# Patient Record
Sex: FEMALE | Race: WHITE | Employment: OTHER | URBAN - METROPOLITAN AREA
[De-identification: names, ages, dates, MRNs, and addresses within clinical notes are randomized per-mention and may not be internally consistent; named-entity substitution may affect disease eponyms.]

---

## 2017-04-03 RX ORDER — METOPROLOL TARTRATE 25 MG/1
12.5 TABLET, FILM COATED ORAL 2 TIMES DAILY
Qty: 90 TAB | Refills: 2 | Status: SHIPPED | OUTPATIENT
Start: 2017-04-03 | End: 2017-10-05 | Stop reason: SDUPTHER

## 2017-05-03 ENCOUNTER — OFFICE VISIT (OUTPATIENT)
Dept: FAMILY MEDICINE CLINIC | Age: 62
End: 2017-05-03

## 2017-05-03 VITALS
BODY MASS INDEX: 20.46 KG/M2 | WEIGHT: 122.8 LBS | SYSTOLIC BLOOD PRESSURE: 118 MMHG | DIASTOLIC BLOOD PRESSURE: 84 MMHG | OXYGEN SATURATION: 98 % | TEMPERATURE: 98.5 F | HEART RATE: 78 BPM | RESPIRATION RATE: 16 BRPM | HEIGHT: 65 IN

## 2017-05-03 DIAGNOSIS — Z13.1 SCREENING FOR DIABETES MELLITUS: ICD-10-CM

## 2017-05-03 DIAGNOSIS — Z00.00 ROUTINE HEALTH MAINTENANCE: ICD-10-CM

## 2017-05-03 DIAGNOSIS — Z13.29 SCREENING FOR THYROID DISORDER: ICD-10-CM

## 2017-05-03 DIAGNOSIS — M85.80 OSTEOPENIA, UNSPECIFIED LOCATION: Chronic | ICD-10-CM

## 2017-05-03 DIAGNOSIS — I25.119 CORONARY ARTERY DISEASE INVOLVING NATIVE CORONARY ARTERY OF NATIVE HEART WITH ANGINA PECTORIS (HCC): Chronic | ICD-10-CM

## 2017-05-03 DIAGNOSIS — J01.01 RECURRENT MAXILLARY SINUSITIS: Primary | ICD-10-CM

## 2017-05-03 RX ORDER — AZITHROMYCIN 250 MG/1
TABLET, FILM COATED ORAL
Qty: 6 TAB | Refills: 0 | Status: SHIPPED | OUTPATIENT
Start: 2017-05-03 | End: 2017-05-08

## 2017-05-03 NOTE — PROGRESS NOTES
Laquita Taylor is a 57 y.o. female here for cold symptoms      1. Have you been to the ER, urgent care clinic or hospitalized since your last visit? NO.     2. Have you seen or consulted any other health care providers outside of the [address] [place] [address] since your last visit (Include any pap smears or colon screening)? NO      Do you have an Advanced Directive? NO    Would you like information on Advanced Directives?  NO

## 2017-05-03 NOTE — PROGRESS NOTES
HISTORY OF PRESENT ILLNESS  De Chiquita is a 57 y.o. female. Cold Symptoms   The history is provided by the patient and medical records. This is a recurrent problem. Episode onset: 1 week ago. Associated symptoms include chills and headaches. Pertinent negatives include no chest pain, no shortness of breath and no wheezing. Patient Active Problem List   Diagnosis Code    Colon polyps K63.5    Glaucoma H40.9    Osteopenia M85.80    Routine health maintenance Z00.00    Cyst of joint of hand M25.849    Recurrent sinusitis J32.9    Postmenopausal state Z78.0    Atypical chest pain R07.89    Coronary artery disease involving native coronary artery of native heart with angina pectoris (HCC) I25.119       Current Outpatient Prescriptions:     metoprolol tartrate (LOPRESSOR) 25 mg tablet, Take 0.5 Tabs by mouth two (2) times a day., Disp: 90 Tab, Rfl: 2    multivitamin with iron (DAILY MULTI-VITAMINS/IRON) tablet, Take 1 Tab by mouth daily. , Disp: , Rfl:     calcium-cholecalciferol, d3, (CALCIUM 600 + D) 600-125 mg-unit Tab, Take  by mouth., Disp: , Rfl:       Review of Systems   Constitutional: Positive for chills and fever (99.8). HENT: Positive for congestion. Facial pain and pressure, purulent nasal discharge   Respiratory: Positive for cough (with sinus drainage). Negative for shortness of breath and wheezing. Cardiovascular: Negative for chest pain and palpitations. Neurological: Positive for headaches. Endo/Heme/Allergies: Positive for environmental allergies. Visit Vitals    /84 (BP 1 Location: Left arm, BP Patient Position: Sitting)    Pulse 78    Temp 98.5 °F (36.9 °C) (Oral)    Resp 16    Ht 5' 4.5\" (1.638 m)    Wt 122 lb 12.8 oz (55.7 kg)    SpO2 98%    BMI 20.75 kg/m2     Physical Exam   Constitutional: She is oriented to person, place, and time. She appears well-developed and well-nourished. HENT:   Head: Normocephalic.    Right Ear: Tympanic membrane and ear canal normal.   Left Ear: Tympanic membrane and ear canal normal.   Nose: Right sinus exhibits maxillary sinus tenderness. Right sinus exhibits no frontal sinus tenderness. Left sinus exhibits maxillary sinus tenderness. Left sinus exhibits no frontal sinus tenderness. Mouth/Throat: Oropharynx is clear and moist.   Eyes: Conjunctivae and EOM are normal.   Neck: Neck supple. Cardiovascular: Normal rate, regular rhythm and normal heart sounds. Pulmonary/Chest: Effort normal and breath sounds normal.   Lymphadenopathy:     She has no cervical adenopathy. Neurological: She is alert and oriented to person, place, and time. Skin: Skin is warm and dry. Psychiatric: She has a normal mood and affect. Her behavior is normal.   Nursing note and vitals reviewed. ASSESSMENT and PLAN    ICD-10-CM ICD-9-CM    1. Recurrent maxillary sinusitis J01.01 461.0 azithromycin (ZITHROMAX) 250 mg tablet   Complete prescribed course of antibiotics. Follow up for new symptoms, worsening symptoms or failure to improve.

## 2017-05-03 NOTE — PATIENT INSTRUCTIONS
Complete prescribed course of antibiotics. Follow up for new symptoms, worsening symptoms or failure to improve. Sinusitis: Care Instructions  Your Care Instructions    Sinusitis is an infection of the lining of the sinus cavities in your head. Sinusitis often follows a cold. It causes pain and pressure in your head and face. In most cases, sinusitis gets better on its own in 1 to 2 weeks. But some mild symptoms may last for several weeks. Sometimes antibiotics are needed. Follow-up care is a key part of your treatment and safety. Be sure to make and go to all appointments, and call your doctor if you are having problems. It's also a good idea to know your test results and keep a list of the medicines you take. How can you care for yourself at home? · Take an over-the-counter pain medicine, such as acetaminophen (Tylenol), ibuprofen (Advil, Motrin), or naproxen (Aleve). Read and follow all instructions on the label. · If the doctor prescribed antibiotics, take them as directed. Do not stop taking them just because you feel better. You need to take the full course of antibiotics. · Be careful when taking over-the-counter cold or flu medicines and Tylenol at the same time. Many of these medicines have acetaminophen, which is Tylenol. Read the labels to make sure that you are not taking more than the recommended dose. Too much acetaminophen (Tylenol) can be harmful. · Breathe warm, moist air from a steamy shower, a hot bath, or a sink filled with hot water. Avoid cold, dry air. Using a humidifier in your home may help. Follow the directions for cleaning the machine. · Use saline (saltwater) nasal washes to help keep your nasal passages open and wash out mucus and bacteria. You can buy saline nose drops at a grocery store or drugstore. Or you can make your own at home by adding 1 teaspoon of salt and 1 teaspoon of baking soda to 2 cups of distilled water.  If you make your own, fill a bulb syringe with the solution, insert the tip into your nostril, and squeeze gently. Danita Redhead your nose. · Put a hot, wet towel or a warm gel pack on your face 3 or 4 times a day for 5 to 10 minutes each time. · Try a decongestant nasal spray like oxymetazoline (Afrin). Do not use it for more than 3 days in a row. Using it for more than 3 days can make your congestion worse. When should you call for help? Call your doctor now or seek immediate medical care if:  · You have new or worse swelling or redness in your face or around your eyes. · You have a new or higher fever. Watch closely for changes in your health, and be sure to contact your doctor if:  · You have new or worse facial pain. · The mucus from your nose becomes thicker (like pus) or has new blood in it. · You are not getting better as expected. Where can you learn more? Go to http://sarah-yared.info/. Enter C982 in the search box to learn more about \"Sinusitis: Care Instructions. \"  Current as of: July 29, 2016  Content Version: 11.2  © 3617-0773 [employer]. Care instructions adapted under license by [employer] (which disclaims liability or warranty for this information). If you have questions about a medical condition or this instruction, always ask your healthcare professional. [address] any warranty or liability for your use of this information.

## 2017-05-03 NOTE — MR AVS SNAPSHOT
Visit Information Date & Time Provider Department Dept. Phone Encounter #  
 5/3/2017 10:45 AM Ronny Lurdes, 3 [facility] [phone] [id number] Your Appointments 9/5/2017 10:30 AM  
Follow Up with Saumil Lorna Sandifer, MD  
Cardio Specialist at [facility]) Appt Note: 5 m f/u after carotid duplex [address]  
  
   
 [address] Upcoming Health Maintenance Date Due DTaP/Tdap/Td series (1 - Tdap) 9/18/1975 ZOSTER VACCINE AGE 60> 9/18/2014 INFLUENZA AGE 9 TO ADULT 8/1/2017 BREAST CANCER SCRN MAMMOGRAM 11/14/2017 PAP AKA CERVICAL CYTOLOGY 11/10/2020 COLONOSCOPY 2/4/2023 Allergies as of 5/3/2017  Review Complete On: 5/3/2017 By: Ronny Chatman MD  
  
 Severity Noted Reaction Type Reactions Ciprofloxacin  04/02/2013    Diarrhea Doxycycline  04/02/2013    Swelling Prednisone  04/02/2013    Diarrhea Current Immunizations  Reviewed on 11/10/2015 Name Date Influenza Vaccine 10/15/2015 Not reviewed this visit You Were Diagnosed With   
  
 Codes Comments Recurrent maxillary sinusitis    -  Primary ICD-10-CM: J01.01 
ICD-9-CM: 461.0 Vitals BP Pulse Temp Resp Height(growth percentile) Weight(growth percentile) 118/84 (BP 1 Location: Left arm, BP Patient Position: Sitting) 78 98.5 °F (36.9 °C) (Oral) 16 5' 4.5\" (1.638 m) 122 lb 12.8 oz (55.7 kg) SpO2 BMI OB Status Smoking Status 98% 20.75 kg/m2 Postmenopausal Never Smoker BMI and BSA Data Body Mass Index Body Surface Area 20.75 kg/m 2 1.59 m 2 Preferred Pharmacy Pharmacy Name Phone [id number] [phone] [phone] Your Updated Medication List  
  
   
This list is accurate as of: 5/3/17 11:18 AM.  Always use your most recent med list.  
  
  
  
  
 azithromycin 250 mg tablet Commonly known as:  O[place]ata Catherine Take 2 tablets today, then take 1 tablet daily CALCIUM 600 + D 600-125 mg-unit Tab Generic drug:  calcium-cholecalciferol (d3) Take  by mouth. DAILY MULTI-VITAMINS/IRON tablet Generic drug:  multivitamin with iron Take 1 Tab by mouth daily. metoprolol tartrate 25 mg tablet Commonly known as:  LOPRESSOR Take 0.5 Tabs by mouth two (2) times a day. Prescriptions Sent to Pharmacy Refills  
 azithromycin (ZITHROMAX) 250 mg tablet 0 Sig: Take 2 tablets today, then take 1 tablet daily Class: Normal  
 Pharmacy: [id number] [phone] [facility] #: [phone] Patient Instructions Complete prescribed course of antibiotics. Follow up for new symptoms, worsening symptoms or failure to improve. Sinusitis: Care Instructions Your Care Instructions Sinusitis is an infection of the lining of the sinus cavities in your head. Sinusitis often follows a cold. It causes pain and pressure in your head and face. In most cases, sinusitis gets better on its own in 1 to 2 weeks. But some mild symptoms may last for several weeks. Sometimes antibiotics are needed. Follow-up care is a key part of your treatment and safety. Be sure to make and go to all appointments, and call your doctor if you are having problems. It's also a good idea to know your test results and keep a list of the medicines you take. How can you care for yourself at home? · Take an over-the-counter pain medicine, such as acetaminophen (Tylenol), ibuprofen (Advil, Motrin), or naproxen (Aleve). Read and follow all instructions on the label. · If the doctor prescribed antibiotics, take them as directed. Do not stop taking them just because you feel better. You need to take the full course of antibiotics.  
· Be careful when taking over-the-counter cold or flu medicines and Tylenol at the same time. Many of these medicines have acetaminophen, which is Tylenol. Read the labels to make sure that you are not taking more than the recommended dose. Too much acetaminophen (Tylenol) can be harmful. · Breathe warm, moist air from a steamy shower, a hot bath, or a sink filled with hot water. Avoid cold, dry air. Using a humidifier in your home may help. Follow the directions for cleaning the machine. · Use saline (saltwater) nasal washes to help keep your nasal passages open and wash out mucus and bacteria. You can buy saline nose drops at a grocery store or [employer]. Or you can make your own at home by adding 1 teaspoon of salt and 1 teaspoon of baking soda to 2 cups of distilled water. If you make your own, fill a bulb syringe with the solution, insert the tip into your nostril, and squeeze gently. Alexandra Jewel your nose. · Put a hot, wet towel or a warm gel pack on your face 3 or 4 times a day for 5 to 10 minutes each time. · Try a decongestant nasal spray like oxymetazoline (Afrin). Do not use it for more than 3 days in a row. Using it for more than 3 days can make your congestion worse. When should you call for help? Call your doctor now or seek immediate medical care if: 
· You have new or worse swelling or redness in your face or around your eyes. · You have a new or higher fever. Watch closely for changes in your health, and be sure to contact your doctor if: 
· You have new or worse facial pain. · The mucus from your nose becomes thicker (like pus) or has new blood in it. · You are not getting better as expected. Where can you learn more? Go to http://sarah-yared.info/. Enter I652 in the search box to learn more about \"Sinusitis: Care Instructions. \" Current as of: July 29, 2016 Content Version: 11.2 © 4439-4506 [employer].  Care instructions adapted under license by [employer] (which disclaims liability or warranty for this information). If you have questions about a medical condition or this instruction, always ask your healthcare professional. [address] any warranty or liability for your use of this information. Introducing [facility] SERVICES! Dear Ceasar Morgan: 
Thank you for requesting a [employer] account. Our records indicate that you already have an active [employer] account. You can access your account anytime at [email or web address]. Jingit/Kool Kid Kent Did you know that you can access your hospital and ER discharge instructions at any time in [employer]? You can also review all of your test results from your hospital stay or ER visit. Additional Information If you have questions, please visit the Frequently Asked Questions section of the [employer] Card website at [email or web address] Remember, [employer] Card is NOT to be used for urgent needs. For medical emergencies, dial 911. Now available from your iPhone and Android! Please provide this summary of care documentation to your next provider. Your primary care clinician is listed as Yaniv Herring. If you have any questions after today's visit, please call [phone].

## 2017-05-04 ENCOUNTER — TELEPHONE (OUTPATIENT)
Dept: FAMILY MEDICINE CLINIC | Age: 62
End: 2017-05-04

## 2017-05-04 DIAGNOSIS — R05.9 COUGH: Primary | ICD-10-CM

## 2017-05-04 RX ORDER — CODEINE PHOSPHATE AND GUAIFENESIN 10; 100 MG/5ML; MG/5ML
SOLUTION ORAL
Qty: 180 ML | Refills: 1 | Status: SHIPPED | OUTPATIENT
Start: 2017-05-04 | End: 2017-06-20

## 2017-05-04 NOTE — TELEPHONE ENCOUNTER
Pt called to ask if Dr. Caitlyn Nath could send something in for her coughing. She states while she in the office yesterday she told Dr. Caitlyn Nath the cough was not bothering her but last night it kept her up all night.     Please advise

## 2017-06-12 ENCOUNTER — TELEPHONE (OUTPATIENT)
Dept: FAMILY MEDICINE CLINIC | Age: 62
End: 2017-06-12

## 2017-06-12 DIAGNOSIS — Z13.29 SCREENING FOR THYROID DISORDER: ICD-10-CM

## 2017-06-12 DIAGNOSIS — Z00.00 ROUTINE HEALTH MAINTENANCE: ICD-10-CM

## 2017-06-12 DIAGNOSIS — I25.119 CORONARY ARTERY DISEASE INVOLVING NATIVE CORONARY ARTERY OF NATIVE HEART WITH ANGINA PECTORIS (HCC): Chronic | ICD-10-CM

## 2017-06-12 DIAGNOSIS — Z13.1 SCREENING FOR DIABETES MELLITUS: ICD-10-CM

## 2017-06-12 DIAGNOSIS — M85.80 OSTEOPENIA, UNSPECIFIED LOCATION: Chronic | ICD-10-CM

## 2017-06-12 NOTE — TELEPHONE ENCOUNTER
Pt has a cpe scheduled 6/20 and she needs her physical labs ordered for her. Please review and order accordingly.

## 2017-06-14 ENCOUNTER — HOSPITAL ENCOUNTER (OUTPATIENT)
Dept: LAB | Age: 62
Discharge: HOME | End: 2017-06-14

## 2017-06-14 PROCEDURE — 99001 SPECIMEN HANDLING PT-LAB: CPT | Performed by: FAMILY MEDICINE

## 2017-06-15 LAB
25(OH)D3+25(OH)D2 SERPL-MCNC: 35.8 NG/ML (ref 30–100)
ALBUMIN SERPL-MCNC: 4.1 G/DL (ref 3.6–4.8)
ALBUMIN/GLOB SERPL: 1.8 {RATIO} (ref 1.2–2.2)
ALP SERPL-CCNC: 38 IU/L (ref 39–117)
ALT SERPL-CCNC: 18 IU/L (ref 0–32)
AST SERPL-CCNC: 23 IU/L (ref 0–40)
BASOPHILS # BLD AUTO: 0 X10E3/UL (ref 0–0.2)
BASOPHILS NFR BLD AUTO: 0 %
BILIRUB SERPL-MCNC: 0.4 MG/DL (ref 0–1.2)
BUN SERPL-MCNC: 14 MG/DL (ref 8–27)
BUN/CREAT SERPL: 21 (ref 12–28)
CALCIUM SERPL-MCNC: 9.3 MG/DL (ref 8.7–10.3)
CHLORIDE SERPL-SCNC: 104 MMOL/L (ref 96–106)
CHOLEST SERPL-MCNC: 232 MG/DL (ref 100–199)
CO2 SERPL-SCNC: 27 MMOL/L (ref 18–29)
CREAT SERPL-MCNC: 0.68 MG/DL (ref 0.57–1)
EOSINOPHIL # BLD AUTO: 0.1 X10E3/UL (ref 0–0.4)
EOSINOPHIL NFR BLD AUTO: 4 %
ERYTHROCYTE [DISTWIDTH] IN BLOOD BY AUTOMATED COUNT: 14.1 % (ref 12.3–15.4)
EST. AVERAGE GLUCOSE BLD GHB EST-MCNC: 117 MG/DL
GLOBULIN SER CALC-MCNC: 2.3 G/DL (ref 1.5–4.5)
GLUCOSE SERPL-MCNC: 94 MG/DL (ref 65–99)
HBA1C MFR BLD: 5.7 % (ref 4.8–5.6)
HCT VFR BLD AUTO: 38.3 % (ref 34–46.6)
HDLC SERPL-MCNC: 88 MG/DL
HGB BLD-MCNC: 12.4 G/DL (ref 11.1–15.9)
IMM GRANULOCYTES # BLD: 0 X10E3/UL (ref 0–0.1)
IMM GRANULOCYTES NFR BLD: 0 %
INTERPRETATION, 910389: NORMAL
LDLC SERPL CALC-MCNC: 134 MG/DL (ref 0–99)
LYMPHOCYTES # BLD AUTO: 1 X10E3/UL (ref 0.7–3.1)
LYMPHOCYTES NFR BLD AUTO: 43 %
MCH RBC QN AUTO: 28.8 PG (ref 26.6–33)
MCHC RBC AUTO-ENTMCNC: 32.4 G/DL (ref 31.5–35.7)
MCV RBC AUTO: 89 FL (ref 79–97)
MONOCYTES # BLD AUTO: 0.2 X10E3/UL (ref 0.1–0.9)
MONOCYTES NFR BLD AUTO: 9 %
NEUTROPHILS # BLD AUTO: 1.1 X10E3/UL (ref 1.4–7)
NEUTROPHILS NFR BLD AUTO: 44 %
PLATELET # BLD AUTO: 154 X10E3/UL (ref 150–379)
POTASSIUM SERPL-SCNC: 4.5 MMOL/L (ref 3.5–5.2)
PROT SERPL-MCNC: 6.4 G/DL (ref 6–8.5)
RBC # BLD AUTO: 4.31 X10E6/UL (ref 3.77–5.28)
SODIUM SERPL-SCNC: 144 MMOL/L (ref 134–144)
T4 FREE SERPL-MCNC: 1.3 NG/DL (ref 0.82–1.77)
TRIGL SERPL-MCNC: 50 MG/DL (ref 0–149)
TSH SERPL DL<=0.005 MIU/L-ACNC: 2.81 UIU/ML (ref 0.45–4.5)
VLDLC SERPL CALC-MCNC: 10 MG/DL (ref 5–40)
WBC # BLD AUTO: 2.4 X10E3/UL (ref 3.4–10.8)

## 2017-06-15 NOTE — PROGRESS NOTES
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 Unchanged. Will discuss @ upcoming visit.   6/20/2017  11:10 AM   Aneesh Mallory MD

## 2017-06-20 ENCOUNTER — OFFICE VISIT (OUTPATIENT)
Dept: FAMILY MEDICINE CLINIC | Age: 62
End: 2017-06-20

## 2017-06-20 VITALS
OXYGEN SATURATION: 100 % | BODY MASS INDEX: 20.26 KG/M2 | WEIGHT: 121.6 LBS | DIASTOLIC BLOOD PRESSURE: 66 MMHG | RESPIRATION RATE: 18 BRPM | TEMPERATURE: 98.1 F | HEIGHT: 65 IN | HEART RATE: 65 BPM | SYSTOLIC BLOOD PRESSURE: 115 MMHG

## 2017-06-20 DIAGNOSIS — R06.00 PAROXYSMAL NOCTURNAL DYSPNEA: ICD-10-CM

## 2017-06-20 DIAGNOSIS — Z00.00 ROUTINE HEALTH MAINTENANCE: Primary | ICD-10-CM

## 2017-06-20 DIAGNOSIS — Z23 ENCOUNTER FOR IMMUNIZATION: ICD-10-CM

## 2017-06-20 DIAGNOSIS — M19.041 DEGENERATIVE ARTHRITIS OF FINGER, RIGHT: ICD-10-CM

## 2017-06-20 DIAGNOSIS — I25.119 CORONARY ARTERY DISEASE INVOLVING NATIVE CORONARY ARTERY OF NATIVE HEART WITH ANGINA PECTORIS (HCC): Chronic | ICD-10-CM

## 2017-06-20 DIAGNOSIS — Z78.9 ASPIRIN INTOLERANCE: ICD-10-CM

## 2017-06-20 DIAGNOSIS — R73.03 PREDIABETES: ICD-10-CM

## 2017-06-20 RX ORDER — ATORVASTATIN CALCIUM 10 MG/1
10 TABLET, FILM COATED ORAL DAILY
Qty: 30 TAB | Refills: 2 | Status: SHIPPED | OUTPATIENT
Start: 2017-06-20 | End: 2017-07-19 | Stop reason: SDUPTHER

## 2017-06-20 RX ORDER — LORATADINE 10 MG/1
10 TABLET ORAL
COMMUNITY
End: 2020-10-05

## 2017-06-20 NOTE — PROGRESS NOTES
[address] [facility]  Primary Care Office Visit - Complete Physical    Jovi Antonio is a 57 y.o. female presenting for annual physical.  : 1955     Assessment/Plan: Amber Worthington was seen today for complete physical, sleep problem and finger pain. Diagnoses and all orders for this visit:    Routine health maintenance  Pt will check her records re: last Tdap vaccine. Otherwise, up to date on preventive screenings. Encounter for immunization  -     varicella zoster vacine live (ZOSTAVAX) 19,400 unit/0.65 mL susr injection; 1 Vial by SubCUTAneous route once for 1 dose. 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 Advised her to inform her siblings, particularly since her brother  in his 46s of an MI with a similar looking \"artifact\" stress test.  Notes mild intolerance to statin (myalgia), thus decreased from 20mg to 10mg, and may try every other day. ASA or antiplatelet therapy not prescibed for medical reasons. -     atorvastatin (LIPITOR) 10 mg tablet; Take 1 Tab by mouth daily. Paroxysmal nocturnal dyspnea  New issue. Heart failure ruled out by recent cath. -     SLEEP MEDICINE REFERRAL    Prediabetes  New issue. Advised to limit simple carbs. Aspirin intolerance    Degenerative arthritis of finger, right  Ongoing. Declines hand surgery referral.    Best practice advisories reviewed. Patient accepts recommendations for zoster vaccine. Management plan & patient instructions reviewed with Jovi Antonio, who voiced understanding. This document may have been created with the aid of dictation software. Text may contain errors, particularly phonetic errors. Elza Pérez MD  Internal Medicine, Family Medicine & Sports Medicine  2017, 11:30 AM    Patient Instructions (provided in AVS): To Do:  · Try to take lipitor 10mg. Even if it is every other day.   Some is better than none. · Tell your siblings about your weird myocardial bridging. · I recommend you get the zoster vaccine. · Double check for me that you are up to date on your tetanus (Tdap or Td)  · We will call you once we have the details [facility]    History: Radha Borjas is a 57 y.o. female presenting for an annual physical.    Overall doing relatively well. Myocardial bridging:  Discovered recently on cath, which she opted to get since her brother's stress also showed \"artifact\" and then he  of an MI. Is 1 of 7 children. Has not notified her siblings yet. Statin intolerance:  Myalgias with lipitor 20. Stopped a few months ago. Chronic sinusitis:  Better on claritin, although she only takes it as needed since she does suffer from significant dry eye as well. Dyspnea at night:  New issue. Has woken up gasping for air. Also  has noticed her gasping when sleeping. Has her worried. Right finger pain:  Becoming more prominent. Still painful. But not interested in any surgical intervention. Past Medical History:   Diagnosis Date    Environmental allergies     outside and inside; [address] Allergy    Glaucoma     Hyperlipidemia     Mitral valve prolapse     s/p cardiac workup; intermittent in nature    Palpitations     PVC (premature ventricular contraction)     S/P cardiac cath     Mid LAD 40-50% with myocardial bridging    Syncope     last time in      Past Surgical History:   Procedure Laterality Date    HX HEMORRHOIDECTOMY      HX TUBAL LIGATION        reports that she has never smoked. She has never used smokeless tobacco. She reports that she does not drink alcohol or use illicit drugs.   Social History     Social History Narrative     History   Smoking Status    Never Smoker   Smokeless Tobacco    Never Used     Family History   Problem Relation Age of Onset    Glaucoma Mother     Hypertension Mother    Delvis Sanchez Glaucoma Father     Cancer Father      Colon    Hypertension Father     Asthma Brother     Hypertension Brother      Allergies   Allergen Reactions    Ciprofloxacin Diarrhea    Doxycycline Swelling    Prednisone Diarrhea       Problem List:      Patient Active Problem List    Diagnosis    Coronary artery disease involving native coronary artery of native heart with angina pectoris ([facility] [address]     - cath (5/2/2016): Mild-moderate LAD stenosis along with myocardial bridging, likely explanation for abnormal stress test; Rec: Aggressive medical management, Use of BB for myocardial bridging and mild CAD of BP allow, start low dose statin      Osteopenia     -- DEXA (5/01/2014): Tscores -1.4 @ L1-L4, -1.2 @ L fem neck, -1.9 @ R fem neck; continue Ca/VitD supplementation    - VitD 34.2 (11/21/2015)      Colon polyps     Followed by Dr. Guillermo Jenkins. Next colonoscopy in 2016.  Atypical chest pain    Postmenopausal state    Cyst of joint of hand    Recurrent sinusitis     - 3/31/2017 [ENT]: immunotherapy q2wk  - 3/16/2017 [ENT]: s/p balloon sinuplasty of B frontal & maxillary sinuses  - 12/21/2016 [facility]]: CT sinuses, f/u afterwards      Routine health maintenance     - Pap: NIL and negative co-test (2015); next due 2020  - mammo: BiRAD1 (Nov 2015); BiRAD1  - lipid wnl (5/12/2014)  - VitD 35.1 (5/12/2014)  - CBC, CMP wnl (5/12/2014)  - c-scope: due Feb 2016 ([facility])  - DEXA: 5/1/2014; repeat in 2yrs (May 2016)      Glaucoma       Medications:     Current Outpatient Prescriptions   Medication Sig    loratadine (CLARITIN) 10 mg tablet Take 10 mg by mouth.  metoprolol tartrate (LOPRESSOR) 25 mg tablet Take 0.5 Tabs by mouth two (2) times a day.  multivitamin with iron (DAILY MULTI-VITAMINS/IRON) tablet Take 1 Tab by mouth daily.  calcium-cholecalciferol, d3, (CALCIUM 600 + D) 600-125 mg-unit Tab Take  by mouth. No current facility-administered medications for this visit.         Review of Systems:     (positives in bold)   Constitutional: fatigue, weight change, appetite change, fevers, chills   Eyes: itchy eyes, runny eyes, red eyes, eye discharge, vision changes, light sensitivity   Neuro: headaches, vision changes, dizziness, loss of consciousness, burning pain, tingling, numbness   ENT: ear pain, ear pressure, ear popping, ear discharge/drainage, hearing change,nosebleeds, sneezing, runny nose, nasal congestion,  change in sense of smell, sore throat, voice change or hoarse voice,   dry mouth, toothache, jaw popping, difficulty swallowing, painful swallowing,   oral ulcers or canker sores   Cardiovascular: chest pain, palpitations, orthopnea, PND   Respiratory: Dyspnea (@ night), wheezing, cough, sputum production, hemoptysis   GI: nausea, vomiting, heartburn, abdominal pain, greasy stools,   blood in stool, diarrhea, constipation   : dysuria, hematuria, change in urine, urinary frequency, urinary urgency   MSK: muscle/joint pain, joint swelling   Derm: rashes, skin changes   Allergy/Imm: seasonal allergies, itchy eyes   Endocrine: Polyuria, polydipsia, polyphagia, heat intolerance, cold intolerance   Heme/lymph: easy bleeding/bruising   Psych: Suicidal ideation, homicidal ideation         Physical Assessment:   VS:    Visit Vitals    /66 (BP 1 Location: Right arm, BP Patient Position: Sitting)    Pulse 65    Temp 98.1 °F (36.7 °C) (Oral)    Resp 18    Ht 5' 4.5\" (1.638 m)    Wt 121 lb 9.6 oz (55.2 kg)    SpO2 100%    BMI 20.55 kg/m2       General:     Well-developed, well-nourished female, in NAD    Head:      PERRL, EOMI.  MMM, good dentition, oropharynx otherwise WNL. No facial asymmetry noted. Ears:    Bilateral TMs wnl. Bilateral EACs wnl. Neck:      Neck supple, no thyromegaly  Cardiovasc:     No JVD. RRR, no MRG. Pulses 2+ and symmetric at distal extremities. Pulmonary:     Lungs clear bilaterally. Normal respiratory effort.   Extremities:     No edema, no TTP bilateral calves. No joint effusions. LEs warm and well-perfused. Neuro:     Alert and oriented, CNs II-XII intact, no focal deficits. Skin:      No rashes noted. Psych:    pleasant, and conversant. Affect, mood & judgment appropriate. Recent Labs & Imaging:     Lab Results   Component Value Date/Time    WBC 2.4 06/14/2017 08:07 AM    HGB 12.4 06/14/2017 08:07 AM    HCT 38.3 06/14/2017 08:07 AM    PLATELET 717 41/24/5461 08:07 AM    MCV 89 06/14/2017 08:07 AM     Lab Results   Component Value Date/Time    Sodium 144 06/14/2017 08:07 AM    Potassium 4.5 06/14/2017 08:07 AM    Chloride 104 06/14/2017 08:07 AM    CO2 27 06/14/2017 08:07 AM    Glucose 94 06/14/2017 08:07 AM    BUN 14 06/14/2017 08:07 AM    Creatinine 0.68 06/14/2017 08:07 AM    BUN/Creatinine ratio 21 06/14/2017 08:07 AM    GFR est  06/14/2017 08:07 AM    GFR est non-AA 94 06/14/2017 08:07 AM    Calcium 9.3 06/14/2017 08:07 AM    Bilirubin, total 0.4 06/14/2017 08:07 AM    AST (SGOT) 23 06/14/2017 08:07 AM    Alk.  phosphatase 38 06/14/2017 08:07 AM    Protein, total 6.4 06/14/2017 08:07 AM    Albumin 4.1 06/14/2017 08:07 AM    A-G Ratio 1.8 06/14/2017 08:07 AM    ALT (SGPT) 18 06/14/2017 08:07 AM     Lab Results   Component Value Date/Time    Cholesterol, total 232 06/14/2017 08:07 AM    HDL Cholesterol 88 06/14/2017 08:07 AM    LDL, calculated 134 06/14/2017 08:07 AM    VLDL, calculated 10 06/14/2017 08:07 AM    Triglyceride 50 06/14/2017 08:07 AM       Lab Results   Component Value Date/Time    Hemoglobin A1c 5.7 06/14/2017 08:07 AM     Lab Results   Component Value Date/Time    TSH 2.810 06/14/2017 08:07 AM     Lab Results   Component Value Date/Time    VITAMIN D, 25-HYDROXY 35.8 06/14/2017 08:07 AM

## 2017-06-20 NOTE — PATIENT INSTRUCTIONS
To Do:  · Try to take lipitor 10mg. Even if it is every other day. Some is better than none. · Tell your siblings about your weird myocardial bridging. · I recommend you get the zoster vaccine.   · Double check for me that you are up to date on your tetanus (Tdap or Td)  · We will call you once we have the details re: a sleep referral @ [facility]

## 2017-06-20 NOTE — PROGRESS NOTES
Sonya De Leon is a 57 y.o. female here for a cpe and c/o right middle finger pain along with sleeping issues. 1. Have you been to the ER, urgent care clinic or hospitalized since your last visit? NO.     2. Have you seen or consulted any other health care providers outside of the [address] [place] [address] since your last visit (Include any pap smears or colon screening)? NO      Do you have an Advanced Directive? NO    Would you like information on Advanced Directives?  NO

## 2017-06-20 NOTE — MR AVS SNAPSHOT
Visit Information Date & Time Provider Department Dept. Phone Encounter #  
 6/20/2017 11:10 AM Sonja Rosales MD [address] [place] [address] [phone] [id number] Your Appointments 9/5/2017 10:30 AM  
Follow Up with Jassi Suazo MD  
Cardio Specialist at [address]) Appt Note: 5 m f/u after carotid duplex [id number] [facility] [address] [facility] [address]  
  
   
 [address] [facility] [address] Upcoming Health Maintenance Date Due DTaP/Tdap/Td series (1 - Tdap) 9/18/1975 ZOSTER VACCINE AGE 60> 9/18/2014 INFLUENZA AGE 9 TO ADULT 8/1/2017 BREAST CANCER SCRN MAMMOGRAM 11/14/2017 PAP AKA CERVICAL CYTOLOGY 11/10/2020 COLONOSCOPY 2/4/2023 Allergies as of 6/20/2017  Review Complete On: 6/20/2017 By: Sonja Rosales MD  
  
 Severity Noted Reaction Type Reactions Ciprofloxacin  04/02/2013    Diarrhea Doxycycline  04/02/2013    Swelling Prednisone  04/02/2013    Diarrhea Current Immunizations  Reviewed on 11/10/2015 Name Date Influenza Vaccine 10/15/2015 Not reviewed this visit You Were Diagnosed With   
  
 Codes Comments Coronary artery disease involving native coronary artery of native heart with angina pectoris ([facility]    -  Primary ICD-10-CM: I25.119 ICD-9-CM: 414.01, 413.9 Vitals BP Pulse Temp Resp Height(growth percentile) Weight(growth percentile)  
 115/66 (BP 1 Location: Right arm, BP Patient Position: Sitting) 65 98.1 °F (36.7 °C) (Oral) 18 5' 4.5\" (1.638 m) 121 lb 9.6 oz (55.2 kg) SpO2 BMI OB Status Smoking Status 100% 20.55 kg/m2 Postmenopausal Never Smoker Vitals History BMI and BSA Data Body Mass Index Body Surface Area 20.55 kg/m 2 1.58 m 2 Preferred Pharmacy Pharmacy Name Phone [id number] [phone] [place]  [phone] Your Updated Medication List  
  
 This list is accurate as of: 6/20/17 11:47 AM.  Always use your most recent med list.  
  
  
  
  
 atorvastatin 10 mg tablet Commonly known as:  LIPITOR Take 1 Tab by mouth daily. CALCIUM 600 + D 600-125 mg-unit Tab Generic drug:  calcium-cholecalciferol (d3) Take  by mouth. CLARITIN 10 mg tablet Generic drug:  loratadine Take 10 mg by mouth. DAILY MULTI-VITAMINS/IRON tablet Generic drug:  multivitamin with iron Take 1 Tab by mouth daily. metoprolol tartrate 25 mg tablet Commonly known as:  LOPRESSOR Take 0.5 Tabs by mouth two (2) times a day. Prescriptions Sent to Pharmacy Refills  
 atorvastatin (LIPITOR) 10 mg tablet 2 Sig: Take 1 Tab by mouth daily. Class: Normal  
 Pharmacy: [id number]- [facility] [phone]  #: [phone] Route: Oral  
  
Patient Instructions To Do: · Try to take lipitor 10mg. Even if it is every other day. Some is better than none. · Tell your siblings about your weird myocardial bridging. · I recommend you get the zoster vaccine. · Double check for me that you are up to date on your tetanus (Tdap or Td) Introducing [facility] & HEALTH SERVICES! Dear Guerline Jo: 
Thank you for requesting a drchrono account. Our records indicate that you already have an active drchrono account. You can access your account anytime at [email or web address] Did you know that you can access your hospital and ER discharge instructions at any time in drchrono? You can also review all of your test results from your hospital stay or ER visit. Additional Information If you have questions, please visit the Frequently Asked Questions section of the drchrono website at [email or web address] Remember, drchrono is NOT to be used for urgent needs. For medical emergencies, dial 911. Now available from your iPhone and Android! Please provide this summary of care documentation to your next provider. Your primary care clinician is listed as Willaim . If you have any questions after today's visit, please call [phone].

## 2017-06-24 PROBLEM — M19.049 DEGENERATIVE ARTHRITIS OF FINGER: Status: ACTIVE | Noted: 2017-06-24

## 2017-07-19 DIAGNOSIS — I25.119 CORONARY ARTERY DISEASE INVOLVING NATIVE CORONARY ARTERY OF NATIVE HEART WITH ANGINA PECTORIS (HCC): Chronic | ICD-10-CM

## 2017-07-19 RX ORDER — ATORVASTATIN CALCIUM 10 MG/1
10 TABLET, FILM COATED ORAL DAILY
Qty: 90 TAB | Refills: 2 | Status: SHIPPED | OUTPATIENT
Start: 2017-07-19 | End: 2018-06-30 | Stop reason: SDUPTHER

## 2017-07-19 NOTE — TELEPHONE ENCOUNTER
Last seen 6/20/17    Last filled 6/20/17 qty 30 w/ 2 refills    Pharmacy is requesting 90 day rx instead.

## 2017-10-05 ENCOUNTER — OFFICE VISIT (OUTPATIENT)
Dept: CARDIOLOGY CLINIC | Age: 62
End: 2017-10-05

## 2017-10-05 VITALS
BODY MASS INDEX: 21.49 KG/M2 | HEIGHT: 65 IN | SYSTOLIC BLOOD PRESSURE: 124 MMHG | OXYGEN SATURATION: 99 % | WEIGHT: 129 LBS | HEART RATE: 67 BPM | DIASTOLIC BLOOD PRESSURE: 76 MMHG

## 2017-10-05 DIAGNOSIS — I25.119 CORONARY ARTERY DISEASE INVOLVING NATIVE CORONARY ARTERY OF NATIVE HEART WITH ANGINA PECTORIS (HCC): Primary | Chronic | ICD-10-CM

## 2017-10-05 RX ORDER — METOPROLOL SUCCINATE 25 MG/1
TABLET, EXTENDED RELEASE ORAL DAILY
COMMUNITY
End: 2017-10-05 | Stop reason: SDUPTHER

## 2017-10-05 RX ORDER — METOPROLOL SUCCINATE 25 MG/1
25 TABLET, EXTENDED RELEASE ORAL DAILY
Qty: 90 TAB | Refills: 3 | Status: SHIPPED | OUTPATIENT
Start: 2017-10-05 | End: 2018-10-09 | Stop reason: SDUPTHER

## 2017-10-05 NOTE — MR AVS SNAPSHOT
Visit Information Date & Time Provider Department Dept. Phone Encounter #  
 10/5/2017  9:45 AM Jassi Sullivan  [facility] [address] [phone] [id number] Follow-up Instructions Return in about 1 year (around 10/5/2018). Upcoming Health Maintenance Date Due DTaP/Tdap/Td series (1 - Tdap) 9/18/1975 ZOSTER VACCINE AGE 60> 7/18/2014 INFLUENZA AGE 9 TO ADULT 8/1/2017 BREAST CANCER SCRN MAMMOGRAM 11/14/2017 PAP AKA CERVICAL CYTOLOGY 11/10/2020 COLONOSCOPY 2/4/2023 Allergies as of 10/5/2017  Review Complete On: 10/5/2017 By: Shakila Lala Severity Noted Reaction Type Reactions Ciprofloxacin  04/02/2013    Diarrhea Doxycycline  04/02/2013    Swelling Prednisone  04/02/2013    Diarrhea Current Immunizations  Reviewed on 11/10/2015 Name Date Influenza Vaccine 10/15/2015 Not reviewed this visit You Were Diagnosed With   
  
 Codes Comments Coronary artery disease involving native coronary artery of native heart with angina pectoris ([facility]    -  Primary ICD-10-CM: I25.119 ICD-9-CM: 414.01, 413.9 Vitals BP Pulse Height(growth percentile) Weight(growth percentile) SpO2 BMI  
 124/76 67 5' 4.5\" (1.638 m) 129 lb (58.5 kg) 99% 21.8 kg/m2 OB Status Smoking Status Postmenopausal Never Smoker BMI and BSA Data Body Mass Index Body Surface Area  
 21.8 kg/m 2 1.63 m 2 Preferred Pharmacy [facility] Name Phone [id number] [phone] [facility] [phone] Your Updated Medication List  
  
   
This list is accurate as of: 10/5/17 10:09 AM.  Always use your most recent med list.  
  
  
  
  
 atorvastatin 10 mg tablet Commonly known as:  LIPITOR Take 1 Tab by mouth daily. CALCIUM 600 + D 600-125 mg-unit Tab Generic drug:  calcium-cholecalciferol (d3) Take  by mouth. CLARITIN 10 mg tablet Generic drug:  loratadine Take 10 mg by mouth. DAILY MULTI-VITAMINS/IRON tablet Generic drug:  multivitamin with iron Take 1 Tab by mouth daily. TOPROL XL 25 mg XL tablet Generic drug:  metoprolol succinate Take  by mouth daily. We Performed the Following AMB POC EKG ROUTINE W/ 12 LEADS, INTER & REP [03534 CPT(R)] Follow-up Instructions Return in about 1 year (around 10/5/2018). Patient Instructions Stop Toprol 12.5 mg twice daily Start Toprol XL 25 mg daily Introducing [facility]! Dear Henry Dodson: 
Thank you for requesting a Gazelle account. Our records indicate that you already have an active Gazelle account. You can access your account anytime at [email or web address] Did you know that you can access your hospital and ER discharge instructions at any time in Gazelle? You can also review all of your test results from your hospital stay or ER visit. Additional Information If you have questions, please visit the Frequently Asked Questions section of the Gazelle website at [email or web address] Remember, Gazelle is NOT to be used for urgent needs. For medical emergencies, dial 911. Now available from your iPhone and Android! Please provide this summary of care documentation to your next provider. Your primary care clinician is listed as [facility]. If you have any questions after today's visit, please call [phone].

## 2017-10-05 NOTE — PROGRESS NOTES
Cardiovascular Specialists    Ms. Candelario is a 60 y.o. female with a history of chronic palpitations, remote history of syncope, hyperlipidemia. Ms. Reza Kruse is here today for follow up appointment. She denies any symptoms to be concerned for angina or heart failure. 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 Occasionally she does have lower chest discomfort only at one significant ribcage point, which gets worse when she palpates on that ribcage. Otherwise she denies any exertional chest pain or chest tightness. She denies any excessive palpitations. She denies any presyncope or syncope. Denies any nausea, vomiting, abdominal pain, fever, chills, sputum production. 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  loratadine (CLARITIN) 10 mg tablet Take 10 mg by mouth.  metoprolol tartrate (LOPRESSOR) 25 mg tablet Take 0.5 Tabs by mouth two (2) times a day.  multivitamin with iron (DAILY MULTI-VITAMINS/IRON) tablet Take 1 Tab by mouth daily.  calcium-cholecalciferol, d3, (CALCIUM 600 + D) 600-125 mg-unit Tab Take  by mouth. No current facility-administered medications for this visit.         Allergies and Sensitivities:  Allergies   Allergen Reactions    Ciprofloxacin Diarrhea    Doxycycline Swelling    Prednisone Diarrhea       Family History:  Family History   Problem Relation Age of Onset   [employer] Glaucoma Mother     Hypertension Mother     Glaucoma Father     Cancer Father      Colon    Hypertension Father     Asthma Brother     Hypertension Brother        Social History:  Social History   Substance Use Topics    Smoking status: Never Smoker    Smokeless tobacco: Never Used    Alcohol use No     She  reports that she has never smoked. She has never used smokeless tobacco.  She  reports that she does not drink alcohol. Review of Systems:  Cardiac symptoms as noted above in HPI. All others negative. Denies fatigue, malaise, skin rash, blurring vision, photophobia, neck pain, hemoptysis, chronic cough, nausea, vomiting, hematuria, burning micturition, BRBPR, chronic headaches. Physical Exam:  BP Readings from Last 3 Encounters:   10/05/17 124/76   06/20/17 115/66   05/03/17 118/84         Pulse Readings from Last 3 Encounters:   10/05/17 67   06/20/17 65   05/03/17 78          Wt Readings from Last 3 Encounters:   10/05/17 129 lb (58.5 kg)   06/20/17 121 lb 9.6 oz (55.2 kg)   05/03/17 122 lb 12.8 oz (55.7 kg)       Constitutional: Oriented to person, place, and time. HENT: Head: Normocephalic and atraumatic. E  Neck: No JVD present. Cardiovascular: Regular rhythm. No murmur, gallop or rubs appreciated  Lung: Breath sounds normal. No respiratory distress. No ronchi or rales appreciated  Abdominal: No tenderness. No rebound and no guarding. Musculoskeletal: There is no lower extremity edema. No cynosis.  Slight pain on palpation of left sided lower rib      Review of Data  LABS:   Lab Results   Component Value Date/Time    Sodium 144 06/14/2017 08:07 AM    Potassium 4.5 06/14/2017 08:07 AM    Chloride 104 06/14/2017 08:07 AM    CO2 27 06/14/2017 08:07 AM    Glucose 94 06/14/2017 08:07 AM    BUN 14 06/14/2017 08:07 AM    Creatinine 0.68 06/14/2017 08:07 AM     Lipids Latest Ref Rng & Units 6/14/2017 11/19/2015 5/8/2014   Chol, Total 100 - 199 mg/dL 232(H) 266(H) 226(H)   HDL >39 mg/dL 88 101 89   LDL 0 - 99 mg/dL 134(H) 154(H) 125(H)   Trig 0 - 149 mg/dL 50 57 62   Some recent data might be hidden     Lab Results   Component Value Date/Time    ALT (SGPT) 18 06/14/2017 08:07 AM     Lab Results   Component Value Date/Time    Hemoglobin A1c 5.7 06/14/2017 08:07 AM       EKG  (11/15) sinus rhythm at 68 bpm. No ST changes of ischemia. ECHO (04/16)  Left ventricle: Size was normal. Systolic function was normal. Ejection  fraction was estimated in the range of 55 % to 60 %. There were no  regional wall motion abnormalities. Wall thickness was normal. Left  ventricular diastolic function parameters were normal for the patient's  age. Right ventricle: Systolic function was normal.  Mitral valve: There was mild regurgitation. Aortic valve: There was no stenosis. Tricuspid valve: There was mild regurgitation. Pulmonic valve: There was trivial regurgitation. CATHETERIZATION (05/16)  LVGram:    EF:  55% without significant wall motion abnormalities    Mitral Regurgitation: No significant    No significant gradient across the aortic valve    Coronary angiography:  Dominance: Right  LM: Angiographically normal  LAD: mid 40-50% along with myocardial bridging, otherwise normal  D1: Ostial 50%  LCX/OM: Angiographically normal  RCA: Dominant, prox 30%, otherwise normal    IMPRESSION & PLAN:  Ms. Pina Samuel is a 60 y.o. female with a history of hyperlipidemia, glaucoma, possible mitral valve prolapse. CAD: mild LAD disease with myocardial bridging as mentioned above. On BB. Continue same. Risk factor modification. No angina currently    Hyperlipidemia:On atorvastatin 20 mg daily. Continue same. Repeat Lipid profile. However not taking atorvastatin regularly. Only takes it probably o nce a week. Advised her to take medication regularly and then repeat lipid profile.      Mitral valve prolapse:  Ms. Pina Samuel was diagnosed with mitral valve prolapse in the 80's. No prolapse by recent echo in 04/16. Trivial to mild MR. Syncope: no syncope since 1990's. ECHO without any structural heart disease. Palpitations almost resolved since taking BB. No complaint. Importance of diet and exercise was discussed with patient. This plan was discussed with patient who is in agreement. Thank you for allowing me to participate in patient care. Please feel free to call me if you have any question or concern. Echo Altamirano MD  Please note: This document has been produced using voice recognition software. Unrecognized errors in transcription may be present.

## 2017-10-12 ENCOUNTER — TELEPHONE (OUTPATIENT)
Dept: CARDIOLOGY CLINIC | Age: 62
End: 2017-10-12

## 2017-10-12 NOTE — TELEPHONE ENCOUNTER
Called patient back. Explained to her that 5 pounds over a few months time is not a lot to be concerned with. Advised her that if she stopped her metoprolol that her palpitations could start bothering her again. Patient agreed that she would not stop the medication.         Verbal order and read back per Basim Montes MD

## 2017-10-12 NOTE — TELEPHONE ENCOUNTER
Pt called office to advise that over the past few months that she gained 5 pounds? She advised that she believes the weight gain is since she started on Metoprolol? Pt denies any change in diet. Pt had no other complaints at this time. I advised pt that Dr. Abelardo Leonard would be made aware of message and if he had any changes in medication that our office would contact her. Pt verbalized understanding ans agrees with plan at this time.

## 2018-01-30 ENCOUNTER — IMPORTED ENCOUNTER (OUTPATIENT)
Dept: URBAN - METROPOLITAN AREA CLINIC 1 | Facility: CLINIC | Age: 63
End: 2018-01-30

## 2018-01-30 PROBLEM — H52.4: Noted: 2018-01-30

## 2018-01-30 PROBLEM — H40.1131: Noted: 2018-01-30

## 2018-01-30 PROCEDURE — S0620 ROUTINE OPHTHALMOLOGICAL EXA: HCPCS

## 2018-01-30 NOTE — PATIENT DISCUSSION
1.  Presbyopia: Rx was given for corrective spectacles if indicated. 2.  Mild Open Angle Glaucoma OU-(0.90/0.90)  IOP was 21 OU. Hx ALT/SLT allergic to Xalatan. Patient is being followed by Dr. Vera Schmidt appt scheduled in a month. Patient to continue with current gtt regimen. Patient advised to be compliant with gtts. Condition was discussed with patient and patient understands. Will continue to monitor patient for any progression in condition. Patient was advised to call us with any problems questions or concerns. 3.  DELFINA-OU-Hx of allergy to Restasis. REC patient to cont using Retain up to QID OU. 4.  Return for an appointment in 1 year for 40. with Dr. Selene Valverde.

## 2018-02-09 ENCOUNTER — IMPORTED ENCOUNTER (OUTPATIENT)
Dept: URBAN - METROPOLITAN AREA CLINIC 1 | Facility: CLINIC | Age: 63
End: 2018-02-09

## 2018-02-09 NOTE — PATIENT DISCUSSION
1.  Glasses Check - MRX for glasses givenReturn for an appointment in 1 year 36 with Dr. Benita Andres.

## 2018-06-30 DIAGNOSIS — I25.119 CORONARY ARTERY DISEASE INVOLVING NATIVE CORONARY ARTERY OF NATIVE HEART WITH ANGINA PECTORIS (HCC): Chronic | ICD-10-CM

## 2018-07-01 RX ORDER — ATORVASTATIN CALCIUM 10 MG/1
TABLET, FILM COATED ORAL
Qty: 90 TAB | Refills: 2 | Status: SHIPPED | OUTPATIENT
Start: 2018-07-01 | End: 2018-08-16

## 2018-08-16 ENCOUNTER — OFFICE VISIT (OUTPATIENT)
Dept: FAMILY MEDICINE CLINIC | Age: 63
End: 2018-08-16

## 2018-08-16 VITALS
HEART RATE: 70 BPM | OXYGEN SATURATION: 100 % | WEIGHT: 127.2 LBS | SYSTOLIC BLOOD PRESSURE: 115 MMHG | TEMPERATURE: 98 F | DIASTOLIC BLOOD PRESSURE: 65 MMHG | BODY MASS INDEX: 21.19 KG/M2 | HEIGHT: 65 IN | RESPIRATION RATE: 18 BRPM

## 2018-08-16 DIAGNOSIS — Z00.00 ROUTINE HEALTH MAINTENANCE: ICD-10-CM

## 2018-08-16 DIAGNOSIS — K63.5 POLYP OF COLON, UNSPECIFIED PART OF COLON, UNSPECIFIED TYPE: Chronic | ICD-10-CM

## 2018-08-16 DIAGNOSIS — Z12.11 SCREEN FOR COLON CANCER: ICD-10-CM

## 2018-08-16 DIAGNOSIS — Z00.00 ROUTINE HEALTH MAINTENANCE: Primary | ICD-10-CM

## 2018-08-16 DIAGNOSIS — I25.119 CORONARY ARTERY DISEASE INVOLVING NATIVE CORONARY ARTERY OF NATIVE HEART WITH ANGINA PECTORIS (HCC): Chronic | ICD-10-CM

## 2018-08-16 DIAGNOSIS — Z12.39 SCREENING FOR BREAST CANCER: ICD-10-CM

## 2018-08-16 DIAGNOSIS — K62.5 RECTAL BLEEDING: ICD-10-CM

## 2018-08-16 RX ORDER — HYDROCORTISONE 25 MG/G
CREAM TOPICAL
Qty: 30 G | Refills: 1 | Status: SHIPPED | OUTPATIENT
Start: 2018-08-16 | End: 2019-01-21 | Stop reason: ALTCHOICE

## 2018-08-16 RX ORDER — HYDROCORTISONE ACETATE 25 MG/1
25 SUPPOSITORY RECTAL
Qty: 6 EACH | Refills: 1 | Status: SHIPPED | OUTPATIENT
Start: 2018-08-16 | End: 2019-01-21 | Stop reason: ALTCHOICE

## 2018-08-16 RX ORDER — LOTEPREDNOL ETABONATE 5 MG/G
1 GEL OPHTHALMIC
Refills: 1 | COMMUNITY
Start: 2018-08-10 | End: 2019-01-21 | Stop reason: ALTCHOICE

## 2018-08-16 RX ORDER — DOXYCYCLINE 100 MG/1
100 CAPSULE ORAL 2 TIMES DAILY
COMMUNITY
End: 2019-01-21 | Stop reason: ALTCHOICE

## 2018-08-16 NOTE — PATIENT INSTRUCTIONS
To Do:  · Use the cream and suppository as needed  · Consider adding some docusate or miralax as needed to prevent constipation, as certainly that contributes to the flares of symptomatic hemorrhoids  · return to the office at your convenience for FASTING (nothing to eat/drink 8-10 hours before, except water) bloodwork; lab opens @ 7am M-F  · Call Dr. Ingrid Minor office to schedule a follow-up colonoscopy      Notes from your doctor:  · manjeet will call you with re: to scheduling your updated mammogram

## 2018-08-16 NOTE — PROGRESS NOTES
Dameon De La Rosa is a 60 y.o. female (: 1955) presenting to address:    Chief Complaint   Patient presents with    Anal Bleeding     started yesterday       Vitals:    18 1305   BP: 115/65   Pulse: 70   Resp: 18   Temp: 98 °F (36.7 °C)   TempSrc: Oral   SpO2: 100%   Weight: 127 lb 3.2 oz (57.7 kg)   Height: 5' 4.5\" (1.638 m)   PainSc:   0 - No pain       Hearing/Vision:   No exam data present    Learning Assessment:     Learning Assessment 2014   PRIMARY LEARNER Patient   HIGHEST LEVEL OF EDUCATION - PRIMARY LEARNER  4 YEARS OF COLLEGE   BARRIERS PRIMARY LEARNER NONE   PRIMARY LANGUAGE ENGLISH   LEARNER PREFERENCE PRIMARY READING     LISTENING   ANSWERED BY self   RELATIONSHIP SELF     Depression Screening:     PHQ over the last two weeks 2018   Little interest or pleasure in doing things Not at all   Feeling down, depressed, irritable, or hopeless Not at all   Total Score PHQ 2 0     Fall Risk Assessment:   No flowsheet data found. Abuse Screening:     Abuse Screening Questionnaire 2016   Do you ever feel afraid of your partner? N   Are you in a relationship with someone who physically or mentally threatens you? N   Is it safe for you to go home? Y     Coordination of Care Questionaire:   1. Have you been to the ER, urgent care clinic since your last visit? Hospitalized since your last visit? NO    2. Have you seen or consulted any other health care providers outside of the [address] [place] [address] since your last visit? Include any pap smears or colon screening. YES Dr. [place] Stuart 8/10/18; Dr. Violeta Hooker     Advanced Directive:   1. Do you have an Advanced Directive? NO    2. Would you like information on Advanced Directives?  NO

## 2018-08-16 NOTE — MR AVS SNAPSHOT
[address] [place] [address] [address] [facility] [address] [phone] Patient: Trip Dueñas MRN: [id number] Visit Information Date & Time Provider Department Dept. Phone Encounter #  
 8/16/2018  1:00 PM Gabriella Braxton, 3 [facility] [phone] [id number] Upcoming Health Maintenance Date Due DTaP/Tdap/Td series (1 - Tdap) 9/18/1975 ZOSTER VACCINE AGE 60> 7/18/2014 BREAST CANCER SCRN MAMMOGRAM 11/14/2017 Influenza Age 5 to Adult 8/30/2018* PAP AKA CERVICAL CYTOLOGY 11/10/2020 COLONOSCOPY 2/4/2023 *Topic was postponed. The date shown is not the original due date. Allergies as of 8/16/2018  Review Complete On: 8/16/2018 By: Gabriella Braxton MD  
  
 Severity Noted Reaction Type Reactions Ciprofloxacin  04/02/2013    Diarrhea Doxycycline  04/02/2013    Swelling Prednisone  04/02/2013    Diarrhea Current Immunizations  Reviewed on 11/10/2015 Name Date Influenza Vaccine 10/15/2015 Not reviewed this visit You Were Diagnosed With   
  
 Codes Comments Routine health maintenance    -  Primary ICD-10-CM: Z00.00 ICD-9-CM: V70.0 Rectal bleeding     ICD-10-CM: K62.5 ICD-9-CM: 569.3 Coronary artery disease involving native coronary artery of native heart with angina pectoris ([facility]     ICD-10-CM: I25.119 ICD-9-CM: 414.01, 413.9 Polyp of colon, unspecified part of colon, unspecified type     ICD-10-CM: K63.5 ICD-9-CM: 211.3 Screening for breast cancer     ICD-10-CM: Z12.31 
ICD-9-CM: V76.10 Vitals BP Pulse Temp Resp Height(growth percentile) Weight(growth percentile)  
 115/65 (BP 1 Location: Right arm, BP Patient Position: Sitting) 70 98 °F (36.7 °C) (Oral) 18 5' 4.5\" (1.638 m) 127 lb 3.2 oz (57.7 kg) SpO2 BMI OB Status Smoking Status 100% 21.5 kg/m2 Postmenopausal Never Smoker Vitals History BMI and BSA Data Body Mass Index Body Surface Area  
 21.5 kg/m 2 1.62 m 2 Preferred Pharmacy Pharmacy Name Phone [facility]/PHARMACY #[id number] [phone] [phone] Your Updated Medication List  
  
   
This list is accurate as of 8/16/18  1:33 PM.  Always use your most recent med list.  
  
  
  
  
 CALCIUM 600 + D 600-125 mg-unit Tab Generic drug:  calcium-cholecalciferol (d3) Take  by mouth. CLARITIN 10 mg tablet Generic drug:  loratadine Take 10 mg by mouth. DAILY MULTI-VITAMINS/IRON tablet Generic drug:  multivitamin with iron Take 1 Tab by mouth daily. doxycycline 100 mg capsule Commonly known as:  Lella Nikhil Take 100 mg by mouth two (2) times a day. hydrocortisone 2.5 % rectal cream  
Commonly known as:  ANUSOL-HC Apply to rectum QID as needed. hydrocortisone 25 mg Supp Commonly known as:  ANUCORT-HC Insert 1 Suppository into rectum every twelve (12) hours as needed. LOTEMAX 0.5 % Drpg Generic drug:  loteprednol etabonate Apply 1 Drop to eye daily as needed. metoprolol succinate 25 mg XL tablet Commonly known as:  TOPROL XL Take 1 Tab by mouth daily. Prescriptions Sent to Pharmacy Refills  
 hydrocortisone (ANUCORT-HC) 25 mg supp 1 Sig: Insert 1 Suppository into rectum every twelve (12) hours as needed. Class: Normal  
 Pharmacy: [facility]/pharmacy #[id number]- [phone] [facility] Ph #: [phone] Route: Rectal  
 hydrocortisone (ANUSOL-HC) 2.5 % rectal cream 1 Sig: Apply to rectum QID as needed. Class: Normal  
 Pharmacy: [facility]/pharm[facility] #[id number][facility] [phone] [employer] Ph #: [phone] To-Do List   
 08/16/2018 Lab:  CBC WITH AUTOMATED DIFF   
  
 08/16/2018 Lab:  HEMOGLOBIN A1C WITH EAG   
  
 08/16/2018 Lab:  LIPID PANEL   
  
 08/16/2018 Imaging:  [facility] MAMMO BI SCREENING INCL CAD   
  
 08/16/2018 Lab: METABOLIC PANEL, COMPREHENSIVE   
  
 08/16/2018 Lab:  T4, FREE   
  
 08/16/2018 Lab:  TSH 3RD GENERATION   
  
 08/16/2018 Lab:  VITAMIN D, 25 HYDROXY Patient Instructions To Do: · Use the cream and suppository as needed · Consider adding some docusate or miralax as needed to prevent constipation, as certainly that contributes to the flares of symptomatic hemorrhoids · return to the office at your convenience for FASTING (nothing to eat/drink 8-10 hours before, except water) bloodwork; lab opens @ 7am M-F 
· Call Dr. Yariel Angulo office to schedule a follow-up colonoscopy Notes from your doctor: 
· manjeet will call you with re: to scheduling your updated mammogram 
 
 
 
 
 
  
Introducing [facility]! Dear Sy Smith: 
Thank you for requesting a "[employer] account. Our records indicate that you already have an active "[employer] account. You can access your account anytime at [email or web address] Did you know that you can access your hospital and ER discharge instructions at any time in "[employer]? You can also review all of your test results from your hospital stay or ER visit. Additional Information If you have questions, please visit the Frequently Asked Questions section of the "[employer] website at [email or web address] Remember, "[employer] is NOT to be used for urgent needs. For medical emergencies, dial 911. Now available from your iPhone and Android! Please provide this summary of care documentation to your next provider. Your primary care clinician is listed as Tripp Deleon. If you have any questions after today's visit, please call [phone].

## 2018-08-16 NOTE — PROGRESS NOTES
[employer]  Primary Care Office Visit - Complete Physical    : 1955   Alexandra Segura is a 60 y.o. female presenting for  Chief Complaint   Patient presents with    Anal Bleeding     started yesterday       Assessment/Plan:     1. Routine health maintenance  - HEMOGLOBIN A1C WITH EAG; Future  - CBC WITH AUTOMATED DIFF; Future  - METABOLIC PANEL, COMPREHENSIVE; Future  - T4, FREE; Future  - LIPID PANEL; Future  - TSH 3RD GENERATION; Future  - VITAMIN D, 25 HYDROXY; Future    2. Rectal bleeding  New issue. Likely 2/2 internal hemorrhoid. - hydrocortisone (ANUCORT-HC) 25 mg supp; Insert 1 Suppository into rectum every twelve (12) hours as needed. Dispense: 6 Each; Refill: 1  - hydrocortisone (ANUSOL-HC) 2.5 % rectal cream; Apply to rectum QID as needed. Dispense: 30 g; Refill: 1  - REFERRAL TO GASTROENTEROLOGY    3. Coronary artery disease involving native coronary artery of native heart with angina pectoris ([facility]  Ongoing, asymptomatic. ASA or antiplatelet therapy not prescibed for medical reasons. Key CAD CHF Meds             metoprolol succinate (TOPROL XL) 25 mg XL tablet  (Taking) Take 1 Tab by mouth daily. 4. Polyp of colon, unspecified part of colon, unspecified type  6. Screen for colon cancer  - REFERRAL TO GASTROENTEROLOGY    5. Screening for breast cancer  - [facility] MAMMO BI SCREENING INCL CAD; Future        Orders & Diagnoses associated with This Encounter:         ICD-10-CM ICD-9-CM   1. Coronary artery disease involving native coronary artery of native heart with angina pectoris ([facility] I25.119 414.01     413.9   2. Polyp of colon, unspecified part of colon, unspecified type K63.5 211.3       Orders Placed This Encounter    LOTEMAX 0.5 % drpg     Sig: Apply 1 Drop to eye daily as needed. Refill:  1    doxycycline (MONODOX) 100 mg capsule     Sig: Take 100 mg by mouth two (2) times a day.          This document may have been created with the aid of dictation software. Text may contain errors, particularly phonetic errors. Reviewed management plan & instructions with patient, who voiced understanding. Latanya Contreras MD  Internal Medicine, Family Medicine & Sports Medicine  8/16/2018, 1:16 PM    Patient Instructions (provided in AVS): To Do:  · Use the cream and suppository as needed  · Consider adding some docusate or miralax as needed to prevent constipation, as certainly that contributes to the flares of symptomatic hemorrhoids  · return to the office at your convenience for FASTING (nothing to eat/drink 8-10 hours before, except water) bloodwork; lab opens @ 7am M-F  · Call Dr. Dorna Angelucci office to schedule a follow-up colonoscopy      Notes from your doctor:  · depaul will call you with re: to scheduling your updated mammogram      History: Selina Whitten is a 60 y.o. female presenting to address:  Chief Complaint   Patient presents with    Anal Bleeding     started yesterday       Last colonoscopy with Dr. Markie Lincoln a few years ago. Likely due. Painless BRBPR started yesterday with bowel movement 3-4x. Endorses intermittent constipation, but denies straining. No stomach pain, nausea vomiting. Hx of hemorrhoids years ago. On doxycycline for started for eye infection. She does note that she has had a history of intolerance with taking PO doxy, mainly if not taken with food because it causes irritation of her esophagus. Currently she is fine if she eats before and after the dose. Otherwise doing well. 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 Past Medical History:   Diagnosis Date    Environmental allergies     outside and inside;  [address] Allergy    Glaucoma     Hyperlipidemia     Mitral valve prolapse 1980s    s/p cardiac workup; intermittent in nature    Palpitations     PVC (premature ventricular contraction)     S/P cardiac cath 05/16    Mid LAD 40-50% with myocardial bridging    Syncope     last time in 1990's     Past Surgical History:   Procedure Laterality Date    HX HEMORRHOIDECTOMY  46s    HX TUBAL LIGATION  1985      reports that she has never smoked. She has never used smokeless tobacco. She reports that she does not drink alcohol or use illicit drugs. 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    (6/20/2017)     History   Smoking Status    Never Smoker   Smokeless Tobacco    Never Used     Family History   Problem Relation Age of Onset   Nigrinus.Parsley Glaucoma Mother     Hypertension Mother     Glaucoma Father     Cancer Father      Colon    Hypertension Father     Asthma Brother     Hypertension Brother      Allergies   Allergen Reactions    Ciprofloxacin Diarrhea    Doxycycline Swelling    Prednisone Diarrhea       Problem List:      Patient Active Problem List    Diagnosis    Coronary artery disease involving native coronary artery of native heart with angina pectoris ([facility]     - cath (5/2/2016): Mild-moderate LAD stenosis along with myocardial bridging, likely explanation for abnormal stress test; Rec: Aggressive medical management, Use of BB for myocardial bridging and mild CAD of BP allow, start low dose statin      Osteopenia     -- DEXA (5/01/2014): Tscores -1.4 @ L1-L4, -1.2 @ L fem neck, -1.9 @ R fem neck; continue Ca/VitD supplementation    - VitD 34.2 (11/21/2015)      Paroxysmal nocturnal dyspnea    Prediabetes    Colon polyps    Recurrent sinusitis     - 3/31/2017 [ENT]: immunotherapy q2wk  - 3/16/2017 [ENT]: s/p balloon sinuplasty of B frontal & maxillary sinuses  - 12/21/2016 [facility] CT sinuses, f/u afterwards      Degenerative arthritis of finger    Aspirin intolerance    Postmenopausal state    Cyst of joint of hand    Routine health maintenance     - Pap: NIL and negative co-test (2015); next due 2020  - mammo: BiRAD1 (Nov 2015);  BiRAD1  - lipid wnl (5/12/2014)  - VitD 35.1 (5/12/2014)  - CBC, CMP wnl (5/12/2014)  - c-scope: due Feb 2016 (Tiffany)  - DEXA: 5/1/2014; repeat in 2yrs (May 2016)      Glaucoma       Medications:     Current Outpatient Prescriptions   Medication Sig    LOTEMAX 0.5 % drpg Apply 1 Drop to eye daily as needed.  doxycycline (MONODOX) 100 mg capsule Take 100 mg by mouth two (2) times a day.  metoprolol succinate (TOPROL XL) 25 mg XL tablet Take 1 Tab by mouth daily.  loratadine (CLARITIN) 10 mg tablet Take 10 mg by mouth.  multivitamin with iron (DAILY MULTI-VITAMINS/IRON) tablet Take 1 Tab by mouth daily.  calcium-cholecalciferol, d3, (CALCIUM 600 + D) 600-125 mg-unit Tab Take  by mouth. No current facility-administered medications for this visit. Review of Systems:     Review of Systems   Constitutional: Negative for chills and fever. HENT: Negative for ear pain and sore throat. Respiratory: Negative for cough and shortness of breath. Cardiovascular: Negative for chest pain and palpitations. Gastrointestinal: Positive for blood in stool and constipation. Negative for abdominal pain, diarrhea, heartburn, melena, nausea and vomiting. Genitourinary: Negative for dysuria. Musculoskeletal: Negative for myalgias. Skin: Negative for rash. Neurological: Negative for speech change, focal weakness and headaches. Endo/Heme/Allergies: Does not bruise/bleed easily. Psychiatric/Behavioral: Negative for depression. The patient is not nervous/anxious and does not have insomnia. Physical Assessment:   VS:    Vitals:    08/16/18 1305   BP: 115/65   Pulse: 70   Resp: 18   Temp: 98 °F (36.7 °C)   TempSrc: Oral   SpO2: 100%   Weight: 127 lb 3.2 oz (57.7 kg)   Height: 5' 4.5\" (1.638 m)   PainSc:   0 - No pain       Physical Exam   Constitutional: She is oriented to person, place, and time. She appears well-developed and well-nourished. HENT:   Head: Normocephalic and atraumatic. Eyes: EOM are normal.   Neck: Neck supple. No thyromegaly present.    Cardiovascular: Normal rate, regular rhythm and intact distal pulses. No murmur heard. Pulmonary/Chest: Effort normal and breath sounds normal. She has no wheezes. She has no rales. Genitourinary: Rectal exam shows internal hemorrhoid. Rectal exam shows no external hemorrhoid, no fissure, no mass, no tenderness and anal tone normal.   Musculoskeletal: Normal range of motion. Neurological: She is alert and oriented to person, place, and time. No cranial nerve deficit. Coordination normal.   Skin: Skin is warm and dry. She is not diaphoretic. Psychiatric: She has a normal mood and affect. Her behavior is normal. Judgment and thought content normal.   Nursing note reviewed.

## 2018-08-17 ENCOUNTER — HOSPITAL ENCOUNTER (OUTPATIENT)
Dept: LAB | Age: 63
Discharge: HOME | End: 2018-08-17

## 2018-08-17 PROCEDURE — 99001 SPECIMEN HANDLING PT-LAB: CPT | Performed by: FAMILY MEDICINE

## 2018-08-18 LAB
25(OH)D3+25(OH)D2 SERPL-MCNC: 39.9 NG/ML (ref 30–100)
ALBUMIN SERPL-MCNC: 4.1 G/DL (ref 3.6–4.8)
ALBUMIN/GLOB SERPL: 1.9 {RATIO} (ref 1.2–2.2)
ALP SERPL-CCNC: 37 IU/L (ref 39–117)
ALT SERPL-CCNC: 17 IU/L (ref 0–32)
AST SERPL-CCNC: 23 IU/L (ref 0–40)
BASOPHILS # BLD AUTO: 0 X10E3/UL (ref 0–0.2)
BASOPHILS NFR BLD AUTO: 0 %
BILIRUB SERPL-MCNC: 0.4 MG/DL (ref 0–1.2)
BUN SERPL-MCNC: 16 MG/DL (ref 8–27)
BUN/CREAT SERPL: 23 (ref 12–28)
CALCIUM SERPL-MCNC: 9.1 MG/DL (ref 8.7–10.3)
CHLORIDE SERPL-SCNC: 104 MMOL/L (ref 96–106)
CHOLEST SERPL-MCNC: 223 MG/DL (ref 100–199)
CO2 SERPL-SCNC: 26 MMOL/L (ref 20–29)
CREAT SERPL-MCNC: 0.69 MG/DL (ref 0.57–1)
EOSINOPHIL # BLD AUTO: 0.1 X10E3/UL (ref 0–0.4)
EOSINOPHIL NFR BLD AUTO: 4 %
ERYTHROCYTE [DISTWIDTH] IN BLOOD BY AUTOMATED COUNT: 14.7 % (ref 12.3–15.4)
EST. AVERAGE GLUCOSE BLD GHB EST-MCNC: 114 MG/DL
GLOBULIN SER CALC-MCNC: 2.2 G/DL (ref 1.5–4.5)
GLUCOSE SERPL-MCNC: 88 MG/DL (ref 65–99)
HBA1C MFR BLD: 5.6 % (ref 4.8–5.6)
HCT VFR BLD AUTO: 38.6 % (ref 34–46.6)
HDLC SERPL-MCNC: 82 MG/DL
HGB BLD-MCNC: 12.2 G/DL (ref 11.1–15.9)
IMM GRANULOCYTES # BLD: 0 X10E3/UL (ref 0–0.1)
IMM GRANULOCYTES NFR BLD: 0 %
INTERPRETATION, 910389: NORMAL
LDLC SERPL CALC-MCNC: 130 MG/DL (ref 0–99)
LYMPHOCYTES # BLD AUTO: 1.1 X10E3/UL (ref 0.7–3.1)
LYMPHOCYTES NFR BLD AUTO: 45 %
MCH RBC QN AUTO: 28.5 PG (ref 26.6–33)
MCHC RBC AUTO-ENTMCNC: 31.6 G/DL (ref 31.5–35.7)
MCV RBC AUTO: 90 FL (ref 79–97)
MONOCYTES # BLD AUTO: 0.2 X10E3/UL (ref 0.1–0.9)
MONOCYTES NFR BLD AUTO: 8 %
NEUTROPHILS # BLD AUTO: 1.1 X10E3/UL (ref 1.4–7)
NEUTROPHILS NFR BLD AUTO: 43 %
PLATELET # BLD AUTO: 164 X10E3/UL (ref 150–379)
POTASSIUM SERPL-SCNC: 4.2 MMOL/L (ref 3.5–5.2)
PROT SERPL-MCNC: 6.3 G/DL (ref 6–8.5)
RBC # BLD AUTO: 4.28 X10E6/UL (ref 3.77–5.28)
SODIUM SERPL-SCNC: 143 MMOL/L (ref 134–144)
T4 FREE SERPL-MCNC: 1.32 NG/DL (ref 0.82–1.77)
TRIGL SERPL-MCNC: 55 MG/DL (ref 0–149)
TSH SERPL DL<=0.005 MIU/L-ACNC: 3.21 UIU/ML (ref 0.45–4.5)
VLDLC SERPL CALC-MCNC: 11 MG/DL (ref 5–40)
WBC # BLD AUTO: 2.5 X10E3/UL (ref 3.4–10.8)

## 2018-10-09 RX ORDER — METOPROLOL SUCCINATE 25 MG/1
25 TABLET, EXTENDED RELEASE ORAL DAILY
Qty: 90 TAB | Refills: 0 | Status: SHIPPED | OUTPATIENT
Start: 2018-10-09 | End: 2019-01-07 | Stop reason: SDUPTHER

## 2018-10-09 NOTE — TELEPHONE ENCOUNTER
PCP: William Singletary MD    Last appt: 10/5/2017  No future appointments. Requested Prescriptions     Pending Prescriptions Disp Refills    metoprolol succinate (TOPROL XL) 25 mg XL tablet 90 Tab 0     Sig: Take 1 Tab by mouth daily. Appt required before further refills. Other Comments:  Advised f/u appt required before further refills.

## 2018-12-31 ENCOUNTER — OFFICE VISIT (OUTPATIENT)
Dept: FAMILY MEDICINE CLINIC | Age: 63
End: 2018-12-31

## 2018-12-31 VITALS
HEART RATE: 80 BPM | HEIGHT: 65 IN | DIASTOLIC BLOOD PRESSURE: 61 MMHG | BODY MASS INDEX: 21.33 KG/M2 | TEMPERATURE: 96.2 F | WEIGHT: 128 LBS | SYSTOLIC BLOOD PRESSURE: 124 MMHG | OXYGEN SATURATION: 97 % | RESPIRATION RATE: 16 BRPM

## 2018-12-31 DIAGNOSIS — J01.11 ACUTE RECURRENT FRONTAL SINUSITIS: ICD-10-CM

## 2018-12-31 DIAGNOSIS — R68.89 FLU-LIKE SYMPTOMS: Primary | ICD-10-CM

## 2018-12-31 RX ORDER — AZITHROMYCIN 250 MG/1
TABLET, FILM COATED ORAL
Qty: 6 TAB | Refills: 0 | Status: SHIPPED | OUTPATIENT
Start: 2018-12-31 | End: 2019-01-05

## 2018-12-31 NOTE — PROGRESS NOTES
HISTORY OF PRESENT ILLNESS Jim Arellano is a 58 y.o. female. Patients symptoms have been longing for one week. She has been around multiple ill contacts. Point of care flu testing has been ordered. Nausea The history is provided by the patient. This is a new problem. The current episode started more than 1 week ago. The problem occurs 2 to 4 times per day. The problem has been gradually worsening. There has been no fever. Associated symptoms include chills, headaches, cough and headaches. Pertinent negatives include no fever, no sweats, no abdominal pain, no diarrhea, no arthralgias and no myalgias. The patient is not pregnant. Risk factors include ill contacts. Cold Symptoms Associated symptoms include chills, headaches, sore throat and nausea. Pertinent negatives include no chest pain, no sweats, no ear pain, no myalgias, no shortness of breath, no wheezing and no vomiting. Sinus Pain Associated symptoms include chills, congestion, sore throat, cough and headaches. Pertinent negatives include no sweats, no ear pain, no shortness of breath and no chest pain. Treatments tried: nasal rinse. Review of Systems Constitutional: Positive for chills. Negative for fever and malaise/fatigue. HENT: Positive for congestion, sinus pain and sore throat. Negative for ear pain, hearing loss and tinnitus. Eyes: Negative for blurred vision, double vision, pain and discharge. Respiratory: Positive for cough and sputum production. Negative for shortness of breath and wheezing. Cardiovascular: Negative for chest pain, palpitations and leg swelling. Gastrointestinal: Positive for nausea. Negative for abdominal pain, blood in stool, diarrhea and vomiting. Genitourinary: Negative for dysuria. Musculoskeletal: Negative for arthralgias, joint pain and myalgias. Neurological: Positive for headaches. Negative for weakness. Endo/Heme/Allergies: Positive for environmental allergies. Psychiatric/Behavioral: Negative for depression. The patient is not nervous/anxious. Visit Vitals /61 Pulse 80 Temp 96.2 °F (35.7 °C) (Oral) Resp 16 Ht 5' 4.5\" (1.638 m) Wt 128 lb (58.1 kg) SpO2 97% BMI 21.63 kg/m² Physical Exam  
Constitutional: She is oriented to person, place, and time. She appears well-developed and well-nourished. No distress. HENT:  
Head: Normocephalic and atraumatic. Right Ear: External ear normal.  
Left Ear: External ear normal.  
Nose: Right sinus exhibits maxillary sinus tenderness and frontal sinus tenderness. Left sinus exhibits maxillary sinus tenderness and frontal sinus tenderness. Mouth/Throat: Posterior oropharyngeal erythema present. Eyes: EOM are normal. Pupils are equal, round, and reactive to light. No scleral icterus. Neck: Normal range of motion. No thyromegaly present. Cardiovascular: Normal rate, regular rhythm and normal heart sounds. Pulmonary/Chest: Effort normal and breath sounds normal. No respiratory distress. She has no wheezes. Abdominal: Soft. Bowel sounds are normal. She exhibits no distension. There is no tenderness. Lymphadenopathy:  
  She has no cervical adenopathy. Neurological: She is alert and oriented to person, place, and time. Psychiatric: She has a normal mood and affect. ASSESSMENT and PLAN Sinusitis : 
1) Ok to take tylenol / motrin to relieve pain. 2) Please finish course of antibiotics , explained side effects and patient verbalizes understanding. 3) Mucolytic's such as guaifenesin which can thin out the mucous. 4) Use nasal steroid inhaler and anti-allergy medication. 5) Can use nasal saline spray or Neti-pot. 6) Please keep a humidifier in your bedroom. 7) Patient instructions and information on diagnosis to be handed out.

## 2019-01-07 NOTE — TELEPHONE ENCOUNTER
PCP: Calin Stringer MD    Last appt: 10/5/2017  Future Appointments   Date Time Provider Hernan Wu   1/18/2019 11:15 AM Lakshmi Ribera MD [address] [place] [address]       Requested Prescriptions     Pending Prescriptions Disp Refills    metoprolol succinate (TOPROL XL) 25 mg XL tablet 90 Tab 0     Sig: Take 1 Tab by mouth daily. Appt scheduled. Other Comments:   30 day supply until appt.

## 2019-01-08 RX ORDER — METOPROLOL SUCCINATE 25 MG/1
25 TABLET, EXTENDED RELEASE ORAL DAILY
Qty: 90 TAB | Refills: 0 | Status: SHIPPED | OUTPATIENT
Start: 2019-01-08 | End: 2019-04-01 | Stop reason: SDUPTHER

## 2019-01-21 ENCOUNTER — OFFICE VISIT (OUTPATIENT)
Dept: CARDIOLOGY CLINIC | Age: 64
End: 2019-01-21

## 2019-01-21 VITALS
HEART RATE: 63 BPM | WEIGHT: 126 LBS | SYSTOLIC BLOOD PRESSURE: 122 MMHG | OXYGEN SATURATION: 99 % | BODY MASS INDEX: 21.29 KG/M2 | DIASTOLIC BLOOD PRESSURE: 72 MMHG

## 2019-01-21 DIAGNOSIS — I25.83 CORONARY ARTERY DISEASE DUE TO LIPID RICH PLAQUE: Primary | ICD-10-CM

## 2019-01-21 DIAGNOSIS — E78.00 PURE HYPERCHOLESTEROLEMIA: ICD-10-CM

## 2019-01-21 DIAGNOSIS — I25.10 CORONARY ARTERY DISEASE DUE TO LIPID RICH PLAQUE: Primary | ICD-10-CM

## 2019-01-21 DIAGNOSIS — I10 ESSENTIAL HYPERTENSION WITH GOAL BLOOD PRESSURE LESS THAN 140/90: ICD-10-CM

## 2019-01-21 RX ORDER — ATORVASTATIN CALCIUM 10 MG/1
10 TABLET, FILM COATED ORAL DAILY
Qty: 30 TAB | Refills: 11 | Status: SHIPPED | OUTPATIENT
Start: 2019-01-21 | End: 2019-09-04 | Stop reason: SINTOL

## 2019-01-21 NOTE — PROGRESS NOTES
[facility] Ms. Candelario is a 58 y.o. female with a history of chronic palpitations, remote history of syncope, hyperlipidemia. Ms. Glen Workman is here today for follow up appointment. Denies any resting or exertional chest pain or chest pressure to suggest angina or any dyspnea to suggest heart failure. No presyncope or syncope Denies any PND or LE edema. Taking all medications regularly. No S/L  NTG since last time Stopped atorvastatin because of occasional cramping. Denies any nausea, vomiting, abdominal pain, fever, chills, sputum production. No hematuria or other bleeding complaints Past Medical History:  
Diagnosis Date  Environmental allergies   
 outside and inside; [address] Allergy  Glaucoma  Hyperlipidemia  Mitral valve prolapse 1980s  
 s/p cardiac workup; intermittent in nature  Palpitations  PVC (premature ventricular contraction)  S/P cardiac cath 05/16 Mid LAD 40-50% with myocardial bridging  Syncope   
 last time in 1990's Past Surgical History:  
Procedure Laterality Date  HX HEMORRHOIDECTOMY  1980s [address] Current Outpatient Medications Medication Sig  
 metoprolol succinate (TOPROL XL) 25 mg XL tablet Take 1 Tab by mouth daily. Appt scheduled.  loratadine (CLARITIN) 10 mg tablet Take 10 mg by mouth.  multivitamin with iron (DAILY MULTI-VITAMINS/IRON) tablet Take 1 Tab by mouth daily.  calcium-cholecalciferol, d3, (CALCIUM 600 + D) 600-125 mg-unit Tab Take  by mouth. No current facility-administered medications for this visit. Allergies and Sensitivities: 
Allergies Allergen Reactions  Ciprofloxacin Diarrhea  Doxycycline Swelling  Prednisone Diarrhea Family History: 
Family History Problem Relation Age of Onset  Glaucoma Mother  Hypertension Mother  Glaucoma Father  Cancer Father Colon  Hypertension Father  Asthma Brother  Hypertension Brother Social History: 
Social History Tobacco Use  Smoking status: Never Smoker  Smokeless tobacco: Never Used Substance Use Topics  Alcohol use: No  
 Drug use: No  
 
She  reports that  has never smoked. she has never used smokeless tobacco.  She  reports that she does not drink alcohol. Review of Systems: 
Cardiac symptoms as noted above in HPI. All others negative. Denies fatigue, malaise, skin rash, blurring vision, photophobia, neck pain, hemoptysis, chronic cough, nausea, vomiting, hematuria, burning micturition, BRBPR, chronic headaches. Physical Exam: 
BP Readings from Last 3 Encounters:  
01/21/19 122/72  
12/31/18 124/61  
08/16/18 115/65 Pulse Readings from Last 3 Encounters:  
01/21/19 63  
12/31/18 80  
08/16/18 70 Wt Readings from Last 3 Encounters:  
01/21/19 126 lb (57.2 kg) 12/31/18 128 lb (58.1 kg) 08/16/18 127 lb 3.2 oz (57.7 kg) Constitutional: Oriented to person, place, and time. HENT: Head: Normocephalic and atraumatic. E 
Neck: No JVD present. Cardiovascular: Regular rhythm. No murmur, gallop or rubs appreciated Lung: Breath sounds normal. No respiratory distress. No ronchi or rales appreciated Abdominal: No tenderness. No rebound and no guarding. Musculoskeletal: There is no lower extremity edema. No cynosis. Review of Data LABS:  
Lab Results Component Value Date/Time Sodium 143 08/17/2018 07:48 AM  
 Potassium 4.2 08/17/2018 07:48 AM  
 Chloride 104 08/17/2018 07:48 AM  
 CO2 26 08/17/2018 07:48 AM  
 Glucose 88 08/17/2018 07:48 AM  
 BUN 16 08/17/2018 07:48 AM  
 Creatinine 0.69 08/17/2018 07:48 AM  
 
Lipids Latest Ref Rng & Units 8/17/2018 6/14/2017 11/19/2015 Chol, Total 100 - 199 mg/dL 223(H) 232(H) 266(H) HDL >39 mg/dL 82 88 101 LDL 0 - 99 mg/dL 130(H) 134(H) 154(H) Trig 0 - 149 mg/dL 55 50 57 Some recent data might be hidden Lab Results Component Value Date/Time ALT (SGPT) 17 08/17/2018 07:48 AM  
 
Lab Results Component Value Date/Time Hemoglobin A1c 5.6 08/17/2018 07:48 AM  
 
 
EKG 
(11/15) sinus rhythm at 68 bpm. No ST changes of ischemia. ECHO (04/16) Left ventricle: Size was normal. Systolic function was normal. Ejection 
fraction was estimated in the range of 55 % to 60 %. There were no 
regional wall motion abnormalities. Wall thickness was normal. Left 
ventricular diastolic function parameters were normal for the patient's 
age. Right ventricle: Systolic function was normal. 
Mitral valve: There was mild regurgitation. Aortic valve: There was no stenosis. Tricuspid valve: There was mild regurgitation. Pulmonic valve: There was trivial regurgitation. CATHETERIZATION (05/16) LVGram: 
  EF:  55% without significant wall motion abnormalities 
  Mitral Regurgitation: No significant 
  No significant gradient across the aortic valve Coronary angiography: 
Dominance: Right LM: Angiographically normal 
LAD: mid 40-50% along with myocardial bridging, otherwise normal 
D1: Ostial 50% LCX/OM: Angiographically normal 
RCA: Dominant, prox 30%, otherwise normal 
 
IMPRESSION & PLAN: 
Ms. Jocelynn Edward is a 58 y.o. female with a history of hyperlipidemia, glaucoma, possible mitral valve prolapse. CAD: mild LAD disease with myocardial bridging as mentioned above. On BB. Continue same. Risk factor modification. No angina currently, No S/L NTG use since last time. Hyperlipidemia: Was On atorvastatin 20 mg daily. Stopped it since last time as occasinal cramping. Willing to try again. Will start low dose again. Advised to try OTC enzyme coq10. Mitral valve prolapse:  Ms. Jocelynn Edward was diagnosed with mitral valve prolapse in the 80's. No prolapse by recent echo in 04/16. Trivial to mild MR.  No fluid overload on exam.  
 
 Syncope: no syncope since 1990's. ECHO without any structural heart disease. Importance of diet and exercise was discussed with patient. This plan was discussed with patient who is in agreement. Thank you for allowing me to participate in patient care. Please feel free to call me if you have any question or concern. Remedios Leonard MD 
Please note: This document has been produced using voice recognition software. Unrecognized errors in transcription may be present.

## 2019-01-21 NOTE — PROGRESS NOTES
1. Have you been to the ER, urgent care clinic since your last visit? Hospitalized since your last visit? No  
 
2. Have you seen or consulted any other health care providers outside of the [address] [place] [address] since your last visit? Include any pap smears or colon screening.   No

## 2019-02-04 ENCOUNTER — OFFICE VISIT (OUTPATIENT)
Dept: FAMILY MEDICINE CLINIC | Age: 64
End: 2019-02-04

## 2019-02-04 VITALS
DIASTOLIC BLOOD PRESSURE: 64 MMHG | SYSTOLIC BLOOD PRESSURE: 132 MMHG | RESPIRATION RATE: 16 BRPM | HEART RATE: 98 BPM | BODY MASS INDEX: 20.66 KG/M2 | WEIGHT: 124 LBS | TEMPERATURE: 101.2 F | OXYGEN SATURATION: 98 % | HEIGHT: 65 IN

## 2019-02-04 DIAGNOSIS — R50.9 FEVER WITH CHILLS: ICD-10-CM

## 2019-02-04 DIAGNOSIS — R68.89 FLU-LIKE SYMPTOMS: Primary | ICD-10-CM

## 2019-02-04 DIAGNOSIS — R52 GENERALIZED BODY ACHES: ICD-10-CM

## 2019-02-04 LAB
FLUAV+FLUBV AG NOSE QL IA.RAPID: NEGATIVE POS/NEG
FLUAV+FLUBV AG NOSE QL IA.RAPID: NEGATIVE POS/NEG
VALID INTERNAL CONTROL?: YES

## 2019-02-04 RX ORDER — CYCLOBENZAPRINE HCL 10 MG
TABLET ORAL
Refills: 0 | COMMUNITY
Start: 2019-02-01 | End: 2019-05-01

## 2019-02-04 RX ORDER — IBUPROFEN 800 MG/1
TABLET ORAL
Refills: 0 | COMMUNITY
Start: 2019-02-01 | End: 2019-05-01

## 2019-02-04 RX ORDER — OSELTAMIVIR PHOSPHATE 75 MG/1
75 CAPSULE ORAL 2 TIMES DAILY
Qty: 10 CAP | Refills: 0 | Status: SHIPPED | OUTPATIENT
Start: 2019-02-04 | End: 2019-02-09

## 2019-02-04 NOTE — PROGRESS NOTES
Results for orders placed or performed in visit on 19   AMB POC SAROJ INFLUENZA A/B TEST   Result Value Ref Range    VALID INTERNAL CONTROL POC Yes     Influenza A Ag POC Negative Negative Pos/Neg    Influenza B Ag POC Negative Negative Pos/Neg         Identified pt with two pt identifiers(name and ). Reviewed record in preparation for visit and have obtained necessary documentation. Chief Complaint   Patient presents with    Flu Like Symptoms     sore throat, fever, head and chest congestion, generalized body aches, loss of appetite for 1 day; pt states she took care of her grandchild last week, who had the flu; pt does states she received flu shot 2018         Health Maintenance Due   Topic    DTaP/Tdap/Td series (1 - Tdap)    Shingrix Vaccine Age 50> (1 of 2)    BREAST CANCER SCRN MAMMOGRAM        Coordination of Care Questionnaire:  :   1) Have you been to an emergency room, urgent care, or hospitalized since your last visit? Yes-19: UC for back pain      2. Have seen or consulted any other health care provider since your last visit? no  If yes, where when, and reason for visit? Patient is accompanied by self I have received verbal consent from Nino Poster to discuss any/all medical information while they are present in the room.

## 2019-02-04 NOTE — PROGRESS NOTES
HISTORY OF PRESENT ILLNESS  Marium Oreilly is a 58 y.o. female. Patient is here today as she mentions yesterday she started to have  Cold like symptoms and she was taking care of her grand-daughter who had the flu last week. She mentions she has been having a cough with clear phlegm , chills , runny nose and body aches. Cold Symptoms   The history is provided by the patient. This is a new problem. The current episode started yesterday. Associated symptoms include chills, headaches and nausea. Pertinent negatives include no chest pain, no sore throat, no shortness of breath, no wheezing and no vomiting. Review of Systems   Constitutional: Positive for chills, fever and malaise/fatigue. HENT: Positive for congestion. Negative for sinus pain and sore throat. Eyes: Negative for blurred vision, double vision, pain and discharge. Respiratory: Positive for cough and sputum production. Negative for shortness of breath and wheezing. Cardiovascular: Negative for chest pain, palpitations, claudication and leg swelling. Gastrointestinal: Positive for nausea. Negative for abdominal pain and vomiting. Musculoskeletal: Positive for joint pain. Neurological: Positive for weakness and headaches. Negative for dizziness. Physical Exam   Constitutional: She is oriented to person, place, and time. She appears well-developed and well-nourished. No distress. HENT:   Head: Normocephalic and atraumatic. Right Ear: External ear normal.   Left Ear: External ear normal.   Nose: Rhinorrhea present. No mucosal edema. Mouth/Throat: Posterior oropharyngeal erythema present. No oropharyngeal exudate. Eyes: EOM are normal. Pupils are equal, round, and reactive to light. No scleral icterus. Neck: Normal range of motion. No thyromegaly present. Cardiovascular: Normal rate, regular rhythm and normal heart sounds. Pulmonary/Chest: Effort normal and breath sounds normal. No respiratory distress. Abdominal: Soft. Bowel sounds are normal. She exhibits no distension. There is no tenderness. Musculoskeletal: Normal range of motion. Lymphadenopathy:     She has no cervical adenopathy. Neurological: She is alert and oriented to person, place, and time. Psychiatric: She has a normal mood and affect. ASSESSMENT and PLAN  Flu like symptoms:  - Point of care testing is negative but patient has been exposed to positive tested flu. - please take anti-viral as prescribed  - For aches and pain with fever more than 100.5 degrees it is ok to take tylenol.  - Rest and maintain good nutrition.  - Drink plenty of fluids to prevent dehydration.  - Ok to do salt water gargle for sore throat( 1/2 tsp of salt in 1 cup of water). - Can use over the counter decongestants with pseudoephedrine to clear nasal passages.  - Ok to use OTC cough medicine in moderation as discussed. - Supplements with vitam A,B,C can help. - Keep a humidifier on and can use a netti- pot / nasal saline spray.

## 2019-04-01 RX ORDER — METOPROLOL SUCCINATE 25 MG/1
25 TABLET, EXTENDED RELEASE ORAL DAILY
Qty: 90 TAB | Refills: 3 | Status: SHIPPED | OUTPATIENT
Start: 2019-04-01 | End: 2020-04-14 | Stop reason: SDUPTHER

## 2019-04-01 NOTE — TELEPHONE ENCOUNTER
PCP: Kat Cortez MD    Last appt: 1/21/2019  No future appointments. Requested Prescriptions     Pending Prescriptions Disp Refills    metoprolol succinate (TOPROL XL) 25 mg XL tablet 90 Tab 3     Sig: Take 1 Tab by mouth daily.

## 2019-04-17 ENCOUNTER — IMPORTED ENCOUNTER (OUTPATIENT)
Dept: URBAN - METROPOLITAN AREA CLINIC 1 | Facility: CLINIC | Age: 64
End: 2019-04-17

## 2019-04-17 PROBLEM — H52.4: Noted: 2019-04-17

## 2019-04-17 PROBLEM — H52.03: Noted: 2019-04-17

## 2019-04-17 PROCEDURE — S0621 ROUTINE OPHTHALMOLOGICAL EXA: HCPCS

## 2019-04-17 NOTE — PATIENT DISCUSSION
1.  Hyperopia OU -- Finalized Glasses MRx was given to patient today for corrective spectacles if indicated2. Presbyopia OU3. Cataracts OU -- Observe 4. Dry Eyes w/ PEK OU -- Recommend continue the frequent use of OTC AT's QID OU Routinely. H/o allergy to Restasis. 5.  Mild Open Angle Glaucoma OU (CD: 0.90/0.95) -- IOP was 2U OU today. H/o ALT / SLT. Allergic to Xalatan. Patient is being followed by Dr. Donte Astudillo. Recommend patient f/u w/ Dr. Donte Astudillo as she missed her last f/u. Patient to continue with current gtt regimen. Patient advised to be compliant with gtts. Condition was discussed with patient and patient understands. Will continue to monitor patient for any progression in condition. Patient was advised to call us with any problems questions or concerns. Return for an appointment in 1 YR for a 36 OU with Dr. David Tavarez.

## 2019-05-01 ENCOUNTER — HOSPITAL ENCOUNTER (OUTPATIENT)
Dept: MAMMOGRAPHY | Age: 64
Discharge: HOME | End: 2019-05-01
Attending: FAMILY MEDICINE
Payer: COMMERCIAL

## 2019-05-01 ENCOUNTER — OFFICE VISIT (OUTPATIENT)
Dept: FAMILY MEDICINE CLINIC | Age: 64
End: 2019-05-01

## 2019-05-01 VITALS
TEMPERATURE: 97.5 F | OXYGEN SATURATION: 100 % | SYSTOLIC BLOOD PRESSURE: 136 MMHG | HEART RATE: 60 BPM | RESPIRATION RATE: 14 BRPM | WEIGHT: 124.4 LBS | BODY MASS INDEX: 20.73 KG/M2 | DIASTOLIC BLOOD PRESSURE: 71 MMHG | HEIGHT: 65 IN

## 2019-05-01 DIAGNOSIS — I25.119 CORONARY ARTERY DISEASE INVOLVING NATIVE CORONARY ARTERY OF NATIVE HEART WITH ANGINA PECTORIS (HCC): ICD-10-CM

## 2019-05-01 DIAGNOSIS — H69.81 DYSFUNCTION OF RIGHT EUSTACHIAN TUBE: Primary | ICD-10-CM

## 2019-05-01 DIAGNOSIS — H92.01 RIGHT EAR PAIN: ICD-10-CM

## 2019-05-01 DIAGNOSIS — Z12.39 SCREENING FOR BREAST CANCER: ICD-10-CM

## 2019-05-01 PROCEDURE — 77063 BREAST TOMOSYNTHESIS BI: CPT

## 2019-05-01 RX ORDER — FLUTICASONE PROPIONATE 50 MCG
2 SPRAY, SUSPENSION (ML) NASAL DAILY
COMMUNITY

## 2019-05-01 NOTE — PROGRESS NOTES
Anushka Barrientos is a 58 y.o. female (: 1955) presenting to address: Chief Complaint Patient presents with  Ear Pain  
  right side since yesterday Vitals:  
 19 1034 BP: 136/71 Pulse: 60 Resp: 14 Temp: 97.5 °F (36.4 °C) TempSrc: Oral  
SpO2: 100% Weight: 124 lb 6.4 oz (56.4 kg) Height: 5' 4.5\" (1.638 m) PainSc:   2 PainLoc: Ear Hearing/Vision: No exam data present Learning Assessment:  
 
Learning Assessment 2014 PRIMARY LEARNER Patient HIGHEST LEVEL OF EDUCATION - PRIMARY LEARNER  4 YEARS OF COLLEGE  
BARRIERS PRIMARY LEARNER NONE PRIMARY LANGUAGE ENGLISH  
LEARNER PREFERENCE PRIMARY READING  
  LISTENING  
ANSWERED BY self RELATIONSHIP SELF Depression Screening:  
 
3 most recent PHQ Screens 2019 Little interest or pleasure in doing things Not at all Feeling down, depressed, irritable, or hopeless Not at all Total Score PHQ 2 0 Fall Risk Assessment:  
 
Fall Risk Assessment, last 12 mths 2019 Able to walk? Yes Fall in past 12 months? No  
 
Abuse Screening:  
 
Abuse Screening Questionnaire 2019 Do you ever feel afraid of your partner? Melvin Krishnan Are you in a relationship with someone who physically or mentally threatens you? Melvin Krishnan Is it safe for you to go home? John Felder Coordination of Care Questionaire: 1. Have you been to the ER, urgent care clinic since your last visit? Hospitalized since your last visit? NO 
 
2. Have you seen or consulted any other health care providers outside of the [address] [place] [address] since your last visit? Include any pap smears or colon screening. YES Dr. Melvin laurent Advanced Directive: 1. Do you have an Advanced Directive? NO 
 
2. Would you like information on Advanced Directives?  NO

## 2019-05-01 NOTE — PATIENT INSTRUCTIONS
To Do: 
· Increase your flonase to at least 2 sprays in each nostril · Look online for some videos re: equalizing pressure Eustachian Tube Problems: Care Instructions Your Care Instructions The eustachian (say \"you-STAY-shee-un\") tubes run between the inside of the ears and the throat. They keep air pressure stable in the ears. If your eustachian tubes become blocked, the air pressure in your ears changes. The fluids from a cold can clog eustachian tubes, causing pain in the ears. A quick change in air pressure can cause eustachian tubes to close up. This might happen when an airplane changes altitude or when a  goes up or down underwater. Eustachian tube problems often clear up on their own or after antibiotic treatment. If your tubes continue to be blocked, you may need surgery. Follow-up care is a key part of your treatment and safety. Be sure to make and go to all appointments, and call your doctor if you are having problems. It's also a good idea to know your test results and keep a list of the medicines you take. How can you care for yourself at home? · To ease ear pain, apply a warm washcloth or a heating pad set on low. There may be some drainage from the ear when the heat melts earwax. Put a cloth between the heat source and your skin. Do not use a heating pad with children. · If your doctor prescribed antibiotics, take them as directed. Do not stop taking them just because you feel better. You need to take the full course of antibiotics. · Your doctor may recommend over-the-counter medicine. Be safe with medicines. Oral or nasal decongestants may relieve ear pain. Avoid decongestants that are combined with antihistamines, which tend to cause more blockage. But if allergies seem to be the problem, your doctor may recommend a combination. Be careful with cough and cold medicines.  Don't give them to children younger than 6, because they don't work for children that age and can even be harmful. For children 6 and older, always follow all the instructions carefully. Make sure you know how much medicine to give and how long to use it. And use the dosing device if one is included. When should you call for help? Call your doctor now or seek immediate medical care if: 
  · You develop sudden, complete hearing loss.  
  · You have severe pain or feel dizzy.  
  · You have new or increasing pus or blood draining from your ear.  
  · You have redness, swelling, or pain around or behind the ear.  
 Watch closely for changes in your health, and be sure to contact your doctor if: 
  · You do not get better after 2 weeks.  
  · You have any new symptoms, such as itching or a feeling of fullness in the ear. Where can you learn more? Go to http://saarh-yared.info/. Enter Y822 in the search box to learn more about \"Eustachian Tube Problems: Care Instructions. \" Current as of: March 27, 2018 Content Version: 11.9 © 9704-9212 [employer]. Care instructions adapted under license by [employer] (which disclaims liability or warranty for this information). If you have questions about a medical condition or this instruction, always ask your healthcare professional. [address] any warranty or liability for your use of this information.

## 2019-05-01 NOTE — PROGRESS NOTES
[facility], [place], operated by [facility] Office Visit - Problem-Oriented : 1955 Guerline Silverio is a 58 y.o. female presenting for Chief Complaint Patient presents with  Ear Pain  
  right side since yesterday Assessment/Plan: 1. Dysfunction of right eustachian tube Likely causing 2. Right ear pain Initial encounter. Advised to increase flonase, learn equilibrium exercises online. Reassurance given. 3. Coronary artery disease involving native coronary artery of native heart with angina pectoris ([address] Followed by cardiology. Orders & Diagnoses associated with This Encounter: ICD-10-CM ICD-9-CM 1. Dysfunction of right eustachian tube H69.81 381.81  
2. Right ear pain H92.01 388.70 3. Coronary artery disease involving native coronary artery of native heart with angina pectoris ([facility] [address] I25.119 414.01  
  413.9 Orders Placed This Encounter  fluticasone propionate (FLONASE ALLERGY RELIEF) 50 mcg/actuation nasal spray Si Sprays by Both Nostrils route daily. This document may have been created with the aid of dictation software. Text may contain errors, particularly phonetic errors. Reviewed management plan & instructions with patient, who voiced understanding. Mary Islas MD 
Internal Medicine, Family Medicine & Sports Medicine 2019 Patient Instructions (provided in AVS): To Do: 
· Increase your flonase to at least 2 sprays in each nostril · Look online for some videos re: equalizing pressure Eustachian Tube Problems: Care Instructions History: Guerline Silverio is a 58 y.o. female presenting to address: Chief Complaint Patient presents with  Ear Pain  
  right side since yesterday Known ongoing significant allergies requiring OTC antihistamine + allergy shots + intranasal steroid spray + saline rinses. Had dental work on R posterior mandible ~ 1 week ago. Yesterday had episodes of sudden shooting R ear pain, 10 of 10, feeling achy between. No fevers or chills. Some soreness on R side of neck with swallowing. Also some R side scalp tenderness. No swelling. No discharge from the ear. Otherwise is doing okay. Plans on retiring in Oct. 
Keeps up with regular cardiology appointments. Past Medical History:  
Diagnosis Date  Environmental allergies   
 outside and inside; [address] Allergy  Glaucoma  Hyperlipidemia  Menopause  Mitral valve prolapse 1980s  
 s/p cardiac workup; intermittent in nature  Palpitations  PVC (premature ventricular contraction)  S/P cardiac cath 05/16 Mid LAD 40-50% with myocardial bridging  Syncope   
 last time in 1990's Past Surgical History:  
Procedure Laterality Date  HX HEMORRHOIDECTOMY  1980s [address]  
 
 reports that she has never smoked. She has never used smokeless tobacco. She reports that she does not drink alcohol or use drugs. 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  
 (6/20/2017) Social History Tobacco Use Smoking Status Never Smoker Smokeless Tobacco Never Used Family History Problem Relation Age of Onset  Glaucoma Mother  Hypertension Mother  Glaucoma Father  Cancer Father Colon  Hypertension Father  Asthma Brother  Hypertension Brother Allergies Allergen Reactions  Ciprofloxacin Diarrhea  Doxycycline Swelling  Prednisone Diarrhea Problem List:  
  
Patient Active Problem List  
 Diagnosis  Coronary artery disease involving native coronary artery of native heart with angina pectoris ([facility] [address]  
  - cath (5/2/2016): Mild-moderate LAD stenosis along with myocardial bridging, likely explanation for abnormal stress test; Rec: Aggressive medical management, Use of BB for myocardial bridging and mild CAD of BP allow, start low dose statin  Osteopenia -- DEXA (5/01/2014): Tscores -1.4 @ L1-L4, -1.2 @ L fem neck, -1.9 @ R fem neck; continue Ca/VitD supplementation - VitD 34.2 (11/21/2015)  Paroxysmal nocturnal dyspnea  Prediabetes  Hyperplastic colon polyp  
  - c-scope (10/2018): hyperplastic polyp; next due in 5 years, 2023 (Dr. Theodor Closs)  Recurrent sinusitis - 3/31/2017 [ENT]: immunotherapy q2wk - 3/16/2017 [ENT]: s/p balloon sinuplasty of B frontal & maxillary sinuses 
- 12/21/2016 [facility] CT sinuses, f/u afterwards  Degenerative arthritis of finger  Aspirin intolerance  Postmenopausal state  Cyst of joint of hand  Routine health maintenance - Pap: NIL and negative co-test (2015); next due 2020 
- mammo: BiRAD1 (Nov 2015); BiRAD1 
- lipid wnl (5/12/2014) - VitD 35.1 (5/12/2014) 
- CBC, CMP wnl (5/12/2014) - DEXA: 5/1/2014; repeat in 2yrs (May 2016)  Glaucoma Medications:  
 
Current Outpatient Medications Medication Sig  
 fluticasone propionate (FLONASE ALLERGY RELIEF) 50 mcg/actuation nasal spray 2 Sprays by Both Nostrils route daily.  metoprolol succinate (TOPROL XL) 25 mg XL tablet Take 1 Tab by mouth daily.  atorvastatin (LIPITOR) 10 mg tablet Take 1 Tab by mouth daily.  loratadine (CLARITIN) 10 mg tablet Take 10 mg by mouth.  multivitamin with iron (DAILY MULTI-VITAMINS/IRON) tablet Take 1 Tab by mouth daily.  calcium-cholecalciferol, d3, (CALCIUM 600 + D) 600-125 mg-unit Tab Take  by mouth. No current facility-administered medications for this visit. Review of Systems:  
 
Review of Systems Constitutional: Negative for chills and fever. HENT: Negative for ear pain and sore throat. Respiratory: Negative for cough and shortness of breath. Cardiovascular: Negative for chest pain and palpitations. Gastrointestinal: Negative for abdominal pain. Genitourinary: Negative for dysuria. Musculoskeletal: Negative for myalgias. Skin: Negative for rash. Neurological: Negative for speech change, focal weakness and headaches. Endo/Heme/Allergies: Positive for environmental allergies (relatively well controlled; allergy shots, flonase, claritin, rinses). Does not bruise/bleed easily. Psychiatric/Behavioral: Negative for depression. The patient is not nervous/anxious and does not have insomnia. Physical Assessment:  
VS:   
Vitals:  
 05/01/19 1034 BP: 136/71 Pulse: 60 Resp: 14 Temp: 97.5 °F (36.4 °C) TempSrc: Oral  
SpO2: 100% Weight: 124 lb 6.4 oz (56.4 kg) Height: 5' 4.5\" (1.638 m) PainSc:   2 PainLoc: Ear Physical Exam  
Constitutional: She is oriented to person, place, and time. She appears well-developed and well-nourished. HENT:  
Head: Normocephalic and atraumatic. Right Ear: External ear and ear canal normal. Tympanic membrane is not erythematous. Tympanic membrane mobility is abnormal.  
Left Ear: Tympanic membrane, external ear and ear canal normal. Tympanic membrane is not erythematous. Tympanic membrane mobility is normal.  
Eyes: EOM are normal.  
Neck: Neck supple. No thyromegaly present. Cardiovascular: Normal rate, regular rhythm and intact distal pulses. No murmur heard. Pulmonary/Chest: Effort normal and breath sounds normal. She has no wheezes. She has no rales. Musculoskeletal: Normal range of motion. Neurological: She is alert and oriented to person, place, and time. No cranial nerve deficit. Coordination normal.  
Skin: Skin is warm and dry. She is not diaphoretic. Psychiatric: She has a normal mood and affect. Her behavior is normal. Judgment and thought content normal.  
Nursing note reviewed.

## 2019-09-04 ENCOUNTER — OFFICE VISIT (OUTPATIENT)
Dept: FAMILY MEDICINE CLINIC | Age: 64
End: 2019-09-04

## 2019-09-04 VITALS
WEIGHT: 123.4 LBS | BODY MASS INDEX: 20.56 KG/M2 | TEMPERATURE: 98.2 F | HEART RATE: 63 BPM | SYSTOLIC BLOOD PRESSURE: 116 MMHG | DIASTOLIC BLOOD PRESSURE: 68 MMHG | OXYGEN SATURATION: 100 % | RESPIRATION RATE: 16 BRPM | HEIGHT: 65 IN

## 2019-09-04 DIAGNOSIS — Z23 ENCOUNTER FOR IMMUNIZATION: ICD-10-CM

## 2019-09-04 DIAGNOSIS — Z00.00 WELCOME TO MEDICARE PREVENTIVE VISIT: Primary | ICD-10-CM

## 2019-09-04 DIAGNOSIS — I25.119 CORONARY ARTERY DISEASE INVOLVING NATIVE CORONARY ARTERY OF NATIVE HEART WITH ANGINA PECTORIS (HCC): Chronic | ICD-10-CM

## 2019-09-04 DIAGNOSIS — Z13.39 SCREENING FOR ALCOHOLISM: ICD-10-CM

## 2019-09-04 DIAGNOSIS — Z00.00 ROUTINE HEALTH MAINTENANCE: ICD-10-CM

## 2019-09-04 DIAGNOSIS — Z78.9 ASPIRIN INTOLERANCE: ICD-10-CM

## 2019-09-04 DIAGNOSIS — R00.2 HEART PALPITATIONS: ICD-10-CM

## 2019-09-04 DIAGNOSIS — R73.03 PREDIABETES: ICD-10-CM

## 2019-09-04 DIAGNOSIS — M85.80 OSTEOPENIA, UNSPECIFIED LOCATION: Chronic | ICD-10-CM

## 2019-09-04 RX ORDER — SIMVASTATIN 5 MG/1
5 TABLET, FILM COATED ORAL
Qty: 30 TAB | Refills: 2 | Status: SHIPPED | OUTPATIENT
Start: 2019-09-04 | End: 2019-11-25 | Stop reason: SDUPTHER

## 2019-09-04 NOTE — PATIENT INSTRUCTIONS
To Do: - return to the office at your convenience for FASTING (nothing to eat/drink 8-10 hours before, except water) bloodwork; lab opens @ 7:30am M-F      Notes from your doctor:  - let's try 5mg simvastatin instead of the 10mg atorvastatin (Lipitor). If this continues to give you issues with side effects, let me know and we can adjust accordingly (maybe just alternate with 5mg every other day. .. Or cutting the tab in 1/2)      Medicare Wellness Notes:  - Included below is some information about Advance Directives. There is also an excellent website for it for the state  Glory Albarran ([email or web address]); if you end up making an Advance Directive, bring me a copy so I can put it in your medical record  - We did your medicare wellness visit today. Below is your \"5 year preventive services plan\"  - Per our records, you are behind on some immunizations. Since you have Medicare, it would be more affordable for you to obtain this immunizations from your local commerical pharmacy (since your Medicare Part D will cover them). Please bring this to your local pharmacy to show them. If you decide to get your vaccines, please ask them to fax our office a copy of the records so our information is also up-to-date. My direct fax is [phone]      Health Maintenance Due   Topic Date Due    Shingles Vaccine (1 of 2) 09/18/2004         Medicare Part B Preventive Services Limitations Recommendation Scheduled   Bone Mass Measurement  (age 72 & older, biennial) A bone mass density test is recommended when a woman turns 65 to screen for osteoporosis. This test is only recommended one time, as a screening. Some providers will use this same test as a disease monitoring tool if you already have osteoporosis.      Cardiovascular Screening Blood Tests (every 5 years)  Total cholesterol, HDL, Triglycerides and ECG Order blood work  as a panel if possible and adults with routine risk  an electrocardiogram (ECG) at intervals determined by your doctor. Colorectal Cancer Screening  -Fecal occult blood test (annual)  -Flexible sigmoidoscopy (5y)  -Screening colonoscopy (10y)  -Barium Enema Colorectal cancer screenings should be done for adults age 54-65 with no increased risk factors for colorectal cancer. There are a number of acceptable methods of screening for this type of cancer. Each test has its own benefits and drawbacks. Discuss with your doctor what is most appropriate for you during your annual wellness visit. The different tests include: colonoscopy (considered the best screening method), a fecal occult blood test, a fecal DNA test, and sigmoidoscopy. Counseling to Prevent Tobacco Use (up to 8 sessions per year)  - Counseling greater than 3 and up to 10 minutes  - Counseling greater than 10 minutes Patients must be asymptomatic of tobacco-related conditions to receive as preventive service     Diabetes Screening Tests (at least every 3 years, Medicare covers annually or at 6-month intervals for prediabetic patients)    Fasting blood sugar (FBS) or glucose tolerance test (GTT) -All adults age 38-68 who are overweight should have a diabetes screening test once every three years.   -Other screening tests and preventive services for persons with diabetes include: an eye exam to screen for diabetic retinopathy, a kidney function test, a foot exam, and stricter control over your cholesterol. Diabetes Self-Management Training (DSMT) (no USPSTF recommendation) Requires referral by treating physician for patient with diabetes or renal disease. 10 hours of initial DSMT session of no less than 30 minutes each in a continuous 12-month period. 2 hours of follow-up DSMT in subsequent years.      Glaucoma Screening (no USPSTF recommendation) Diabetes mellitus, family history, , age 48 or over,  American, age 72 or over     Human Immunodeficiency Virus (HIV) Screening (annually for increased risk patients)  HIV-1 and HIV-2 by EIA, REGI, rapid antibody test, or oral mucosa transudate Patient must be at increased risk for HIV infection per USPSTF guidelines or pregnant. Tests covered annually for patients at increased risk. Pregnant patients may receive up to 3 test during pregnancy. Medical Nutrition Therapy (MNT) (for diabetes or renal disease not recommended schedule) Requires referral by treating physician for patient with diabetes or renal disease. Can be provided in same year as diabetes self-management training (DSMT), and CMS recommends medical nutrition therapy take place after DSMT. Up to 3 hours for initial year and 2 hours in subsequent years. Shingles Vaccination A shingles vaccine is also recommended once in a lifetime after age 61     Seasonal Influenza Vaccination (annually) All adults should have a flu vaccine yearly      Pneumococcal Vaccination (once after 72) All adults over the age of 72 should receive the recommended pneumonia vaccines. Current USPSTF guidelines recommend a series of two vaccines for the best pneumonia protection. Hepatitis B Vaccinations (if medium/high risk) Medium/high risk factors:  End-stage renal disease,  Hemophiliacs who received Factor VIII or IX concentrates, Clients of institutions for the mentally retarded, Persons who live in the same house as a HepB virus carrier, Homosexual men, Illicit injectable drug abusers. Screening Mammography (biennial age 54-69) Breast cancer screenings are recommended every other year for women of normal risk, age 54-69.      Screening Pap Tests and Pelvic Examination (up to age 79 and after 79 if unknown history or abnormal study last 10 years) Cervical cancer screenings for women over age 72 are only recommended with certain risk factors     Hepatitis C All adults born between 80 and 1965 should be screened once        Tetanus  All adults should have a tetanus vaccine every 10 years                  Advance Directives: Care Instructions  Your Care Instructions  An advance directive is a legal way to state your wishes at the end of your life. It tells your family and your doctor what to do if you can no longer say what you want. There are two main types of advance directives. You can change them any time that your wishes change. · A living will tells your family and your doctor your wishes about life support and other treatment. · A durable power of  for health care lets you name a person to make treatment decisions for you when you can't speak for yourself. This person is called a health care agent. If you do not have an advance directive, decisions about your medical care may be made by a doctor or a  who doesn't know you. It may help to think of an advance directive as a gift to the people who care for you. If you have one, they won't have to make tough decisions by themselves. Follow-up care is a key part of your treatment and safety. Be sure to make and go to all appointments, and call your doctor if you are having problems. It's also a good idea to know your test results and keep a list of the medicines you take. How can you care for yourself at home? · Discuss your wishes with your loved ones and your doctor. This way, there are no surprises. · Many states have a unique form. Or you might use a universal form that has been approved by many states. This kind of form can sometimes be completed and stored online. Your electronic copy will then be available wherever you have a connection to the Internet. In most cases, doctors will respect your wishes even if you have a form from a different state. · You don't need a  to do an advance directive. But you may want to get legal advice. · Think about these questions when you prepare an advance directive:  ? Who do you want to make decisions about your medical care if you are not able to?  Many people choose a family member or close friend. ? Do you know enough about life support methods that might be used? If not, talk to your doctor so you understand. ? What are you most afraid of that might happen? You might be afraid of having pain, losing your independence, or being kept alive by machines. ? Where would you prefer to die? Choices include your home, a hospital, or a nursing home. ? Would you like to have information about hospice care to support you and your family? ? Do you want to donate organs when you die? ? Do you want certain Sikh practices performed before you die? If so, put your wishes in the advance directive. · Read your advance directive every year, and make changes as needed. When should you call for help? Be sure to contact your doctor if you have any questions. Where can you learn more? Go to http://sarah-yared.info/. Enter R264 in the search box to learn more about \"Advance Directives: Care Instructions. \"  Current as of: April 1, 2019  Content Version: 12.1  © 9681-7562 [employer], [employer]. Care instructions adapted under license by [email or web address] (which disclaims liability or warranty for this information). If you have questions about a medical condition or this instruction, always ask your healthcare professional. [address] any warranty or liability for your use of this information.

## 2019-09-04 NOTE — PROGRESS NOTES
Rivera Garcia is a 58 y.o. female (: 1955) presenting to address:    Chief Complaint   Patient presents with    Complete Physical    Annual Wellness Visit            Vitals:    19 1010   BP: 116/68   Pulse: 63   Resp: 16   Temp: 98.2 °F (36.8 °C)   TempSrc: Oral   SpO2: 100%   Weight: 123 lb 6.4 oz (56 kg)   Height: 5' 4.5\" (1.638 m)   PainSc:   6   PainLoc: Generalized       Hearing/Vision:      Hearing Screening    125Hz 250Hz 500Hz 1000Hz 2000Hz 3000Hz 4000Hz 6000Hz 8000Hz   Right ear:   40 40 40  40     Left ear:   40 40 40  40        Visual Acuity Screening    Right eye Left eye Both eyes   Without correction:      With correction: 20/40 20/20 20/15       Learning Assessment:     Learning Assessment 2014   PRIMARY LEARNER Patient   HIGHEST LEVEL OF EDUCATION - PRIMARY LEARNER  4 YEARS OF COLLEGE   BARRIERS PRIMARY LEARNER NONE   PRIMARY LANGUAGE ENGLISH   LEARNER PREFERENCE PRIMARY READING     LISTENING   ANSWERED BY self   RELATIONSHIP SELF     Depression Screening:     3 most recent PHQ Screens 2019   Little interest or pleasure in doing things Not at all   Feeling down, depressed, irritable, or hopeless Not at all   Total Score PHQ 2 0     Fall Risk Assessment:     Fall Risk Assessment, last 12 mths 2019   Able to walk? Yes   Fall in past 12 months? No     Abuse Screening:     Abuse Screening Questionnaire 2019   Do you ever feel afraid of your partner? N   Are you in a relationship with someone who physically or mentally threatens you? N   Is it safe for you to go home? Y     Coordination of Care Questionaire:   1. Have you been to the ER, urgent care clinic since your last visit? Hospitalized since your last visit? NO    2. Have you seen or consulted any other health care providers outside of the [address] [place] [address] since your last visit? Include any pap smears or colon screening. NO    Advanced Directive:   1. Do you have an Advanced Directive? NO    2.  Would you like information on Advanced Directives? NO    Flu Immunization/s administered 9/4/2019 by Jarred Danielson LPN with patients consent. Patient tolerated procedure well. No reactions noted.

## 2019-09-04 NOTE — PROGRESS NOTES
Date of Service:  2019   Patient Name:  Melissa Paris   Patient :  1955     This is a \"Welcome to [employer]"  Initial Preventive Physical Examination (IPPE) providing Personalized Prevention Plan Services (Performed in the first 12 months of enrollment)     I have reviewed the patient's medical history in detail and updated the computerized patient record. History     Past Medical History:   Diagnosis Date    Environmental allergies     outside and inside; [address] Allergy    Glaucoma     Hyperlipidemia     Menopause     Mitral valve prolapse     s/p cardiac workup; intermittent in nature    Palpitations     PVC (premature ventricular contraction)     S/P cardiac cath     Mid LAD 40-50% with myocardial bridging    Syncope     last time in       Past Surgical History:   Procedure Laterality Date    HX HEMORRHOIDECTOMY      HX TUBAL LIGATION       Current Outpatient Medications   Medication Sig Dispense Refill    varicella-zoster recombinant, PF, (SHINGRIX) 50 mcg/0.5 mL susr injection 0.5 mL by IntraMUSCular route once for 1 dose. 2nd dose to be given 8 weeks after 1st dose. 0.5 mL 1    fluticasone propionate (FLONASE ALLERGY RELIEF) 50 mcg/actuation nasal spray 2 Sprays by Both Nostrils route daily.  metoprolol succinate (TOPROL XL) 25 mg XL tablet Take 1 Tab by mouth daily. 90 Tab 3    loratadine (CLARITIN) 10 mg tablet Take 10 mg by mouth.  multivitamin with iron (DAILY MULTI-VITAMINS/IRON) tablet Take 1 Tab by mouth daily.  calcium-cholecalciferol, d3, (CALCIUM 600 + D) 600-125 mg-unit Tab Take  by mouth.  atorvastatin (LIPITOR) 10 mg tablet Take 1 Tab by mouth daily.  30 Tab 11     Allergies   Allergen Reactions    Ciprofloxacin Diarrhea    Doxycycline Swelling    Prednisone Diarrhea     Family History   Problem Relation Age of Onset   [facility] Glaucoma Mother     Hypertension Mother     Glaucoma Father     Cancer Father Colon    Hypertension Father     Asthma Brother     Hypertension Brother      Social History     Tobacco Use    Smoking status: Never Smoker    Smokeless tobacco: Never Used   Substance Use Topics    Alcohol use: No       Living situation:   -- Lives with    -- Davidson Seymour in home    Diet, Lifestyle: No special diet; trying to eat healthy    Exercise level: sedentary    Depression Risk Screen     3 most recent PHQ Screens 9/4/2019   Little interest or pleasure in doing things Not at all   Feeling down, depressed, irritable, or hopeless Not at all   Total Score PHQ 2 0       Alcohol Risk Screen   You do not drink alcohol or very rarely. Functional Ability and Level of Safety   Hearing Loss  Hearing is good. Vision Screening  Vision is good. No exam data present      Activities of Daily Living  The home contains: no safety equipment. Patient does total self care      Fall Risk Screen  Fall Risk Assessment, last 12 mths 9/4/2019   Able to walk? Yes   Fall in past 12 months? No       Abuse Screen  Patient is not abused      Review of Systems   A comprehensive review of systems was negative except for that written in the separate problem-oriented documentation.     Physical Examination     VS:   Visit Vitals  /68 (BP 1 Location: Right arm, BP Patient Position: Sitting)   Pulse 63   Temp 98.2 °F (36.8 °C) (Oral)   Resp 16   Ht 5' 4.5\" (1.638 m)   Wt 123 lb 6.4 oz (56 kg)   SpO2 100%   BMI 20.85 kg/m²        Hearing Screening    125Hz 250Hz 500Hz 1000Hz 2000Hz 3000Hz 4000Hz 6000Hz 8000Hz   Right ear:   40 40 40  40     Left ear:   40 40 40  40        Visual Acuity Screening    Right eye Left eye Both eyes   Without correction:      With correction: 20/40 20/20 20/15        Evaluation of Cognitive Function:  Mood/affect:  happy  Appearance: age appropriate  Family member/caregiver input: none available    EKG order placed: No    Patient Care Team   Patient Care Team:  Germaine Stephens MD as PCP - General (Family Practice)  Sera Wilhelm MD as Consulting Provider (Gastroenterology)  Yamileth Blood MD as Consulting Provider (Ophthalmology)  Emma Griffin MD as Consulting Provider (Otolaryngology)     End of Life Planning   Advanced care planning directives were discussed with the patient and /or family/caregiver. Advice/Counseling   Education and counseling provided:  Are appropriate based on today's review and evaluation  End-of-Life planning (with patient's consent)  Pneumococcal Vaccine  Influenza Vaccine  Screening Mammography  Screening Pap and pelvic (covered once every 2 years)  Colorectal cancer screening tests  Cardiovascular screening blood test  Bone mass measurement (DEXA)  Screening for glaucoma  Diabetes screening test    Assessment/Plan   Diagnoses and all orders for this visit:    1. Welcome to Medicare preventive visit    2. Screening for alcoholism  -     ME ANNUAL ALCOHOL SCREEN 15 MIN    3. Encounter for immunization  -     INFLUENZA VIRUS VAC QUAD,SPLIT,PRESV FREE SYRINGE IM  -     ME IMMUNIZ ADMIN,1 SINGLE/COMB VAC/TOXOID  -     varicella-zoster recombinant, PF, (SHINGRIX) 50 mcg/0.5 mL susr injection; 0.5 mL by IntraMUSCular route once for 1 dose. 2nd dose to be given 8 weeks after 1st dose.       Health Maintenance Due   Topic Date Due    DTaP/Tdap/Td series (1 - Tdap) 09/18/1975    Shingrix Vaccine Age 50> (1 of 2) 09/18/2004    Bone Densitometry (Dexa) Screening  09/18/2019         Garry Resendiz Andree was provided a written 5-year personalized preventive services plan, which was included in her AVS.    Gail Ruiz MD  Internal Medicine, Family Medicine & Sports Medicine

## 2019-09-04 NOTE — PROGRESS NOTES
[address]   Primary Care Office Visit - Problem-Oriented (Separate from the [facility] Wellness Visit)    : 1955   Ubaldo Basurto is a 58 y.o. female    Assessment/Plan:     4. Coronary artery disease involving native coronary artery of native heart with angina pectoris ([facility]  Ongoing, asymptomatic. Notes possible intolerance to Lipitor. Amenable to changing to low-dose Zocor. Future considerations: Lowering dose to half a pill, or taking a dose every other day. - METABOLIC PANEL, COMPREHENSIVE; Future  - LIPID PANEL; Future  - simvastatin (ZOCOR) 5 mg tablet; Take 1 Tab by mouth nightly. Dispense: 30 Tab; Refill: 2    5. Aspirin intolerance    6. Heart palpitations  Ongoing, intermittent in nature. Less than once every 2 months. Discussed management options, would rather continue observation rather than returning back to cardiology. - CBC WITH AUTOMATED DIFF; Future  - METABOLIC PANEL, COMPREHENSIVE; Future  - T4, FREE; Future  - TSH 3RD GENERATION; Future    7. Prediabetes  Unclear control. Check labs  - HEMOGLOBIN A1C WITH EAG; Future  - LIPID PANEL; Future    8. Osteopenia, unspecified location  Last DEXA in . Check labs. - VITAMIN D, 25 HYDROXY; Future      Orders & Diagnoses associated with This Encounter:       ICD-10-CM ICD-9-CM   4. Coronary artery disease involving native coronary artery of native heart with angina pectoris ([facility] I25.119 414.01     413.9   5. Aspirin intolerance Z78.9 995.27     E935.3   6. Heart palpitations R00.2 785.1   7. Prediabetes R73.03 790.29   8.  Osteopenia, unspecified location M85.80 733.90         Orders Placed This Encounter    Influenza virus vaccine (QUADRIVALENT PRES FREE SYRINGE) IM ([id number])    HEMOGLOBIN A1C WITH EAG     Standing Status:   Future     Number of Occurrences:   1     Standing Expiration Date:   2020    CBC WITH AUTOMATED DIFF     Standing Status:   Future     Number of Occurrences:   1     Standing Expiration Date:   4/2/9630    METABOLIC PANEL, COMPREHENSIVE     Standing Status:   Future     Number of Occurrences:   1     Standing Expiration Date:   9/4/2020    T4, FREE     Standing Status:   Future     Number of Occurrences:   1     Standing Expiration Date:   9/4/2020    TSH 3RD GENERATION     Standing Status:   Future     Number of Occurrences:   1     Standing Expiration Date:   9/4/2020    LIPID PANEL     Standing Status:   Future     Number of Occurrences:   1     Standing Expiration Date:   9/4/2020    VITAMIN D, 25 HYDROXY     Standing Status:   Future     Number of Occurrences:   1     Standing Expiration Date:   9/4/2020    simvastatin (ZOCOR) 5 mg tablet     Sig: Take 1 Tab by mouth nightly. Dispense:  30 Tab     Refill:  2         Everett Claude, MD  Internal Medicine, Family Medicine & Sports Medicine  9/4/2019    Patient Instructions (included in AVS): To Do:  - return to the office at your convenience for FASTING (nothing to eat/drink 8-10 hours before, except water) bloodwork; lab opens @ 7:30am M-F      Notes from your doctor:  - let's try 5mg simvastatin instead of the 10mg atorvastatin (Lipitor). If this continues to give you issues with side effects, let me know and we can adjust accordingly (maybe just alternate with 5mg every other day. .. Or cutting the tab in 1/2)      Medicare Wellness Notes:  - Included below is some information about Advance Directives. There is also an excellent website for it for the state [facility] ([email or web address]); if you end up making an Advance Directive, bring me a copy so I can put it in your medical record  - We did your medicare wellness visit today. Below is your \"5 year preventive services plan\"  - Per our records, you are behind on some immunizations. Since you have Medicare, it would be more affordable for you to obtain this immunizations from your local commerical pharmacy (since your Medicare Part D will cover them).   Please bring this to your local pharmacy to show them. If you decide to get your vaccines, please ask them to fax our office a copy of the records so our information is also up-to-date. My direct fax is [phone]      Health Maintenance Due   Topic Date Due    Shingles Vaccine (1 of 2) 09/18/2004     Advance Directives: Care Instructions    History: Nikhil Killian is a 58 y.o. female presenting to address:  Chief Complaint   Patient presents with    Complete Physical    Annual Wellness Visit            concerend about weight gain possibly being due secondary to use of Lipitor. Noted some slow weight gain over one year when she was on Lipitor. Has discontinued the medication herself, and lost the same amount of weight, albeit quite slowly. Notes that she did have an episode of dizziness with some possible palpitations at work 1 to 2 weeks ago. Attributes this to stress, and states that this resolved in 1 day. Will be retiring in less than 1 week. Plans on going on a [employer] [facility] cruise in the near future. [ see AWV documentation for pertinent PMHx, PSHx, FHx, allergies & meds]     Problem List:     [ see AWV documentation for active problem list ]     Review of Systems:     (only positive ROS in this note, all negatives included in  [address] documentation)    Review of Systems   Cardiovascular: Positive for palpitations. Neurological: Positive for dizziness. Physical Assessment:   VS:  [ see AWV documentation for Vital Signs ]    Physical Exam   Constitutional: She is oriented to person, place, and time. She appears well-developed and well-nourished. HENT:   Head: Normocephalic and atraumatic. Eyes: EOM are normal.   Neck: Neck supple. No thyromegaly present. Cardiovascular: Normal rate, regular rhythm and intact distal pulses. No murmur heard. Pulmonary/Chest: Effort normal and breath sounds normal. She has no wheezes. She has no rales.    Musculoskeletal: Normal range of motion. Neurological: She is alert and oriented to person, place, and time. No cranial nerve deficit. Coordination normal.   Skin: Skin is warm and dry. She is not diaphoretic. Psychiatric: She has a normal mood and affect. Her behavior is normal. Judgment and thought content normal.   Nursing note reviewed.

## 2019-09-12 LAB
25(OH)D3+25(OH)D2 SERPL-MCNC: 44.7 NG/ML (ref 30–100)
ALBUMIN SERPL-MCNC: 4.1 G/DL (ref 3.6–4.8)
ALBUMIN/GLOB SERPL: 1.6 {RATIO} (ref 1.2–2.2)
ALP SERPL-CCNC: 38 IU/L (ref 39–117)
ALT SERPL-CCNC: 21 IU/L (ref 0–32)
AST SERPL-CCNC: 25 IU/L (ref 0–40)
BASOPHILS # BLD AUTO: 0 X10E3/UL (ref 0–0.2)
BASOPHILS NFR BLD AUTO: 1 %
BILIRUB SERPL-MCNC: 0.3 MG/DL (ref 0–1.2)
BUN SERPL-MCNC: 11 MG/DL (ref 8–27)
BUN/CREAT SERPL: 19 (ref 12–28)
CALCIUM SERPL-MCNC: 9.6 MG/DL (ref 8.7–10.3)
CHLORIDE SERPL-SCNC: 107 MMOL/L (ref 96–106)
CHOLEST SERPL-MCNC: 233 MG/DL (ref 100–199)
CO2 SERPL-SCNC: 27 MMOL/L (ref 20–29)
CREAT SERPL-MCNC: 0.59 MG/DL (ref 0.57–1)
EOSINOPHIL # BLD AUTO: 0.1 X10E3/UL (ref 0–0.4)
EOSINOPHIL NFR BLD AUTO: 3 %
ERYTHROCYTE [DISTWIDTH] IN BLOOD BY AUTOMATED COUNT: 12.7 % (ref 12.3–15.4)
EST. AVERAGE GLUCOSE BLD GHB EST-MCNC: 117 MG/DL
GLOBULIN SER CALC-MCNC: 2.5 G/DL (ref 1.5–4.5)
GLUCOSE SERPL-MCNC: 89 MG/DL (ref 65–99)
HBA1C MFR BLD: 5.7 % (ref 4.8–5.6)
HCT VFR BLD AUTO: 39 % (ref 34–46.6)
HDLC SERPL-MCNC: 83 MG/DL
HGB BLD-MCNC: 12.3 G/DL (ref 11.1–15.9)
IMM GRANULOCYTES # BLD AUTO: 0 X10E3/UL (ref 0–0.1)
IMM GRANULOCYTES NFR BLD AUTO: 1 %
INTERPRETATION, 910389: NORMAL
LDLC SERPL CALC-MCNC: 139 MG/DL (ref 0–99)
LYMPHOCYTES # BLD AUTO: 0.8 X10E3/UL (ref 0.7–3.1)
LYMPHOCYTES NFR BLD AUTO: 37 %
MCH RBC QN AUTO: 28.2 PG (ref 26.6–33)
MCHC RBC AUTO-ENTMCNC: 31.5 G/DL (ref 31.5–35.7)
MCV RBC AUTO: 89 FL (ref 79–97)
MONOCYTES # BLD AUTO: 0.2 X10E3/UL (ref 0.1–0.9)
MONOCYTES NFR BLD AUTO: 10 %
MORPHOLOGY BLD-IMP: ABNORMAL
NEUTROPHILS # BLD AUTO: 1.1 X10E3/UL (ref 1.4–7)
NEUTROPHILS NFR BLD AUTO: 48 %
PLATELET # BLD AUTO: 171 X10E3/UL (ref 150–450)
POTASSIUM SERPL-SCNC: 4.6 MMOL/L (ref 3.5–5.2)
PROT SERPL-MCNC: 6.6 G/DL (ref 6–8.5)
RBC # BLD AUTO: 4.36 X10E6/UL (ref 3.77–5.28)
SODIUM SERPL-SCNC: 143 MMOL/L (ref 134–144)
T4 FREE SERPL-MCNC: 1.37 NG/DL (ref 0.82–1.77)
TRIGL SERPL-MCNC: 53 MG/DL (ref 0–149)
TSH SERPL DL<=0.005 MIU/L-ACNC: 3.14 UIU/ML (ref 0.45–4.5)
VLDLC SERPL CALC-MCNC: 11 MG/DL (ref 5–40)
WBC # BLD AUTO: 2.2 X10E3/UL (ref 3.4–10.8)

## 2019-10-16 ENCOUNTER — OFFICE VISIT (OUTPATIENT)
Dept: FAMILY MEDICINE CLINIC | Age: 64
End: 2019-10-16

## 2019-10-16 VITALS
OXYGEN SATURATION: 98 % | WEIGHT: 126 LBS | DIASTOLIC BLOOD PRESSURE: 51 MMHG | TEMPERATURE: 98 F | BODY MASS INDEX: 20.99 KG/M2 | HEIGHT: 65 IN | SYSTOLIC BLOOD PRESSURE: 113 MMHG | RESPIRATION RATE: 16 BRPM | HEART RATE: 68 BPM

## 2019-10-16 DIAGNOSIS — B02.9 HERPES ZOSTER WITHOUT COMPLICATION: Primary | ICD-10-CM

## 2019-10-16 RX ORDER — VALACYCLOVIR HYDROCHLORIDE 1 G/1
1000 TABLET, FILM COATED ORAL 3 TIMES DAILY
Qty: 21 TAB | Refills: 0 | Status: SHIPPED | OUTPATIENT
Start: 2019-10-16 | End: 2019-10-23

## 2019-10-16 NOTE — PATIENT INSTRUCTIONS

## 2019-10-16 NOTE — PROGRESS NOTES
Kaylynn Westbrook is a 71 y.o. female (: 1955) presenting to address:    Chief Complaint   Patient presents with    Rash     right side of back itchy and yesterday it started to hurt        Vitals:    10/16/19 1522   BP: 113/51   Pulse: 68   Resp: 16   Temp: 98 °F (36.7 °C)   TempSrc: Oral   SpO2: 98%   Weight: 126 lb (57.2 kg)   Height: 5' 4.5\" (1.638 m)   PainSc:   6   PainLoc: Back       Hearing/Vision:   No exam data present    Learning Assessment:     Learning Assessment 2014   PRIMARY LEARNER Patient   HIGHEST LEVEL OF EDUCATION - PRIMARY LEARNER  4 YEARS OF COLLEGE   BARRIERS PRIMARY LEARNER NONE   PRIMARY LANGUAGE ENGLISH   LEARNER PREFERENCE PRIMARY READING     LISTENING   ANSWERED BY self   RELATIONSHIP SELF     Depression Screening:     3 most recent PHQ Screens 2019   Little interest or pleasure in doing things Not at all   Feeling down, depressed, irritable, or hopeless Not at all   Total Score PHQ 2 0     Fall Risk Assessment:     Fall Risk Assessment, last 12 mths 2019   Able to walk? Yes   Fall in past 12 months? No     Abuse Screening:     Abuse Screening Questionnaire 2019   Do you ever feel afraid of your partner? N   Are you in a relationship with someone who physically or mentally threatens you? N   Is it safe for you to go home? Y     Coordination of Care Questionaire:   1. Have you been to the ER, urgent care clinic since your last visit? Hospitalized since your last visit? NO    2. Have you seen or consulted any other health care providers outside of the [address] [place] [address] since your last visit? Include any pap smears or colon screening. NO    Advanced Directive:   1. Do you have an Advanced Directive? NO    2. Would you like information on Advanced Directives?  NO

## 2019-10-16 NOTE — PROGRESS NOTES
Assessment/Plan:    *Diagnoses and all orders for this visit:    1. Herpes zoster without complication    Other orders  -     valACYclovir (VALTREX) 1 gram tablet; Take 1 Tab by mouth three (3) times daily for 7 days. Indications: shingles        Can use Tylenol or Motrin for pain. Also suggested OTC topical lidocaine to the area. The plan was discussed with the patient. The patient verbalized understanding and is in agreement with the plan. All medication potential side effects were discussed with the patient.    -------------------------------------------------------------------------------------------------------------------        Tarah Manriquez is a 71 y.o. female and presents with Rash (right side of back itchy and yesterday it started to hurt )         Subjective:  Pt here for a new, painful rash, on the RT of her back, that has been present for 1 week. Has says it is a little itchy but she applied Neosporin intermittently and this has helped. ROS:  Review of Systems - Negative except as above        The problem list was updated as a part of today's visit. Patient Active Problem List   Diagnosis Code    Hyperplastic colon polyp K63.5    Glaucoma H40.9    Osteopenia M85.80    Routine health maintenance Z00.00    Cyst of joint of hand M25.849    Recurrent sinusitis J32.9    Postmenopausal state Z78.0    Coronary artery disease involving native coronary artery of native heart with angina pectoris (HCC) I25.119    Paroxysmal nocturnal dyspnea R06.00    Prediabetes R73.03    Aspirin intolerance Z78.9    Degenerative arthritis of finger M19.049       The PSH, FH were reviewed.         SH:  Social History     Tobacco Use    Smoking status: Never Smoker    Smokeless tobacco: Never Used   Substance Use Topics    Alcohol use: No    Drug use: No       Medications/Allergies:  Current Outpatient Medications on File Prior to Visit   Medication Sig Dispense Refill    simvastatin (ZOCOR) 5 mg tablet Take 1 Tab by mouth nightly. 30 Tab 2    fluticasone propionate (FLONASE ALLERGY RELIEF) 50 mcg/actuation nasal spray 2 Sprays by Both Nostrils route daily.  metoprolol succinate (TOPROL XL) 25 mg XL tablet Take 1 Tab by mouth daily. 90 Tab 3    loratadine (CLARITIN) 10 mg tablet Take 10 mg by mouth.  multivitamin with iron (DAILY MULTI-VITAMINS/IRON) tablet Take 1 Tab by mouth daily.  calcium-cholecalciferol, d3, (CALCIUM 600 + D) 600-125 mg-unit Tab Take  by mouth. No current facility-administered medications on file prior to visit. Allergies   Allergen Reactions    Ciprofloxacin Diarrhea    Doxycycline Swelling    Prednisone Diarrhea         Health Maintenance:   Health Maintenance   Topic Date Due    DTaP/Tdap/Td series (1 - Tdap) 09/18/1975    Shingrix Vaccine Age 50> (1 of 2) 09/18/2004    Bone Densitometry (Dexa) Screening  09/18/2019    Pneumococcal 65+ years (1 of 2 - PCV13) 09/18/2019    GLAUCOMA SCREENING Q2Y  06/28/2020    MEDICARE YEARLY EXAM  09/04/2020    PAP AKA CERVICAL CYTOLOGY  11/10/2020    BREAST CANCER SCRN MAMMOGRAM  05/01/2021    COLONOSCOPY  10/04/2023    Hepatitis C Screening  Completed    Influenza Age 9 to Adult  Completed       Objective:  Visit Vitals  /51 (BP 1 Location: Left arm, BP Patient Position: Sitting)   Pulse 68   Temp 98 °F (36.7 °C) (Oral)   Resp 16   Ht 5' 4.5\" (1.638 m)   Wt 126 lb (57.2 kg)   SpO2 98%   BMI 21.29 kg/m²          Nurses notes and VS reviewed.       Physical Examination: General appearance - alert, well appearing, and in no distress  Skin - 5-6 small vesicles on an erythematous background, stable, TTP          Labwork and Ancillary Studies:    CBC w/Diff  Lab Results   Component Value Date/Time    WBC 2.2 (LL) 09/11/2019 09:38 AM    HGB 12.3 09/11/2019 09:38 AM    PLATELET 469 78/84/8734 09:38 AM         Basic Metabolic Profile  Lab Results   Component Value Date/Time    Sodium 143 09/11/2019 09:38 AM Potassium 4.6 09/11/2019 09:38 AM    Chloride 107 (H) 09/11/2019 09:38 AM    CO2 27 09/11/2019 09:38 AM    Glucose 89 09/11/2019 09:38 AM    BUN 11 09/11/2019 09:38 AM    Creatinine 0.59 09/11/2019 09:38 AM    BUN/Creatinine ratio 19 09/11/2019 09:38 AM    GFR est  09/11/2019 09:38 AM    GFR est non-AA 97 09/11/2019 09:38 AM    Calcium 9.6 09/11/2019 09:38 AM         LFT  Lab Results   Component Value Date/Time    ALT (SGPT) 21 09/11/2019 09:38 AM    AST (SGOT) 25 09/11/2019 09:38 AM    Alk.  phosphatase 38 (L) 09/11/2019 09:38 AM    Bilirubin, total 0.3 09/11/2019 09:38 AM         Cholesterol  Lab Results   Component Value Date/Time    Cholesterol, total 233 (H) 09/11/2019 09:38 AM    HDL Cholesterol 83 09/11/2019 09:38 AM    LDL, calculated 139 (H) 09/11/2019 09:38 AM    Triglyceride 53 09/11/2019 09:38 AM

## 2019-11-25 DIAGNOSIS — I25.119 CORONARY ARTERY DISEASE INVOLVING NATIVE CORONARY ARTERY OF NATIVE HEART WITH ANGINA PECTORIS (HCC): Chronic | ICD-10-CM

## 2019-11-25 RX ORDER — SIMVASTATIN 5 MG/1
TABLET, FILM COATED ORAL
Qty: 90 TAB | Refills: 1 | Status: SHIPPED | OUTPATIENT
Start: 2019-11-25 | End: 2020-10-05

## 2019-12-26 ENCOUNTER — TELEPHONE (OUTPATIENT)
Dept: FAMILY MEDICINE CLINIC | Age: 64
End: 2019-12-26

## 2019-12-26 DIAGNOSIS — B02.29 POST HERPETIC NEURALGIA: Primary | ICD-10-CM

## 2019-12-26 RX ORDER — GABAPENTIN 100 MG/1
100-200 CAPSULE ORAL 3 TIMES DAILY
Qty: 180 CAP | Refills: 1 | Status: SHIPPED | OUTPATIENT
Start: 2019-12-26 | End: 2019-12-31 | Stop reason: ALTCHOICE

## 2019-12-27 NOTE — TELEPHONE ENCOUNTER
After hours call. Severe neck and back pain since Sun, 12/22. Only on the R side, down the posterior back, arm pit, posterior aspect of R arm. Hx of zoster in Oct.  Was seen by a physician at [facility] on 12/24, and was given diclofenac 50mg to be taken TID prn, but unfortunately does not do anything. Pain is burning, tingling quality. No rash currently. Dx: post herpetic neuralgia    Orders Placed This Encounter    gabapentin (NEURONTIN) 100 mg capsule     Sig: Take 1-2 Caps by mouth three (3) times daily for 30 days. Max Daily Amount: 600 mg. Indications: Nerve Pain after Herpes     Dispense:  180 Cap     Refill:  1     Advised starting with 1 tab PO QHS. Then may use TID PRN. Warned re: somnolence. Told her we will try to get her scheduled for a follow-up during the upcoming week. Raza Banda voiced understanding.     Luis Alfredo Jay MD  Internal Medicine, Family Medicine & Sports Medicine

## 2019-12-30 NOTE — TELEPHONE ENCOUNTER
Patient scheduled for    Future Appointments   Date Time Provider Hernan Jazmin   12/31/2019  7:30 AM Daphne Kay MD [facility]       She states she is taking Gabapentin 2 tabs 3 times daily with no relief.  Please advise if something else can be given per patients request.

## 2019-12-31 ENCOUNTER — OFFICE VISIT (OUTPATIENT)
Dept: FAMILY MEDICINE CLINIC | Age: 64
End: 2019-12-31

## 2019-12-31 ENCOUNTER — HOSPITAL ENCOUNTER (OUTPATIENT)
Dept: PHYSICAL THERAPY | Age: 64
Discharge: HOME | End: 2019-12-31
Payer: MEDICARE

## 2019-12-31 VITALS
HEIGHT: 65 IN | WEIGHT: 122.8 LBS | DIASTOLIC BLOOD PRESSURE: 71 MMHG | OXYGEN SATURATION: 99 % | RESPIRATION RATE: 16 BRPM | TEMPERATURE: 96.9 F | BODY MASS INDEX: 20.46 KG/M2 | SYSTOLIC BLOOD PRESSURE: 132 MMHG | HEART RATE: 67 BPM

## 2019-12-31 DIAGNOSIS — G89.29 CHRONIC NECK PAIN: ICD-10-CM

## 2019-12-31 DIAGNOSIS — M54.2 CHRONIC NECK PAIN: ICD-10-CM

## 2019-12-31 DIAGNOSIS — R29.898 DECREASED RANGE OF MOTION OF NECK: ICD-10-CM

## 2019-12-31 DIAGNOSIS — M25.511 TRIGGER POINT OF RIGHT SHOULDER REGION: Primary | ICD-10-CM

## 2019-12-31 DIAGNOSIS — Z23 ENCOUNTER FOR IMMUNIZATION: ICD-10-CM

## 2019-12-31 DIAGNOSIS — M54.12 CERVICAL RADICULOPATHY: ICD-10-CM

## 2019-12-31 DIAGNOSIS — M25.511 TRIGGER POINT OF RIGHT SHOULDER REGION: ICD-10-CM

## 2019-12-31 PROCEDURE — 97140 MANUAL THERAPY 1/> REGIONS: CPT

## 2019-12-31 PROCEDURE — 97162 PT EVAL MOD COMPLEX 30 MIN: CPT

## 2019-12-31 RX ORDER — TIZANIDINE 4 MG/1
4 TABLET ORAL
Qty: 60 TAB | Refills: 1 | Status: SHIPPED | OUTPATIENT
Start: 2019-12-31 | End: 2020-01-03

## 2019-12-31 RX ORDER — KETOROLAC TROMETHAMINE 30 MG/ML
30 INJECTION, SOLUTION INTRAMUSCULAR; INTRAVENOUS ONCE
Qty: 1 VIAL | Refills: 0
Start: 2019-12-31 | End: 2019-12-31

## 2019-12-31 NOTE — PROGRESS NOTES
[address] PHYSICAL THERAPY    [address] [place] [address] 51, [address] [facility], [facility]       Phone: [phone]  Fax: [phone] / [address] [facility]  Patient Name: Guerline Silverio : 1955   Medical   Diagnosis: Cervical Radiculopathy Treatment Diagnosis: Chronic neck pain [M54.2, G89.29]  Decreased range of motion of neck [R29.898]  Trigger point of right shoulder region [M25.511]   Onset Date: 19     Referral Source: Cherry Klein MD [facility]): 2019   Prior Hospitalization: See medical history Provider #: [id number]   Prior Level of Function: No difficulty with ADL's. Comorbidities: Heart Disease, Arthritis   Medications: Verified on Patient Summary List   The Plan of Care and following information is based on the information from the initial evaluation.   ===========================================================================================  Assessment / key information:  Patient is a 71year old female presenting to therapy with signs and symptoms consistent with acute cervical radiculopathy. Patient states she woke up on 19 with severe neck and upper back pain for unknown reasons. Patient states her symptoms progressed to R sided neck and arm pain which caused numbness and tingling into her R hand. Patient tried OTC medication which did not help. Patient did receive a Toradol shot from her MD this morning which has really helped. Patient also received x-rays but does not know the results yet. Patient reports increased difficulty with all ADL's at this time secondary to pain. Patient notes symptoms feel better with heat and medication. Patient rates pain at worst 10/10 and 5/10 at best.   Objective Data: Inspection-Fair seated posture. Pronounced C7 spinous process, excessive lordosis at cervical spine at C5-C6.  Cervical spine AROM: flex 42, ext 40 (P!), R rot 45, L rot 35; R SB 20, L SB 20. Shoulder AROM: WNL bilaterally. Strength Testing: Shoulder ER R 5/5, L 5/5; IR R 5/5, L 5/5; Elbow Flex R 5/5, L 5/5; ext R 5/5, L 5/5. Reduced PIVM to R cervical spine for flex, ext and rot. Reduced symptoms with manual cervical traction. Tenderness to R cervical paraspinals, R UT, R thoracic paraspinals, R rhomboids and R infraspinatus/teres minor. FOTO: 42/100. Patient educated on diagnosis, prognosis, POC and HEP. Patient issued copy of HEP and denied additional questions. Patient will benefit from skilled PT in order to address these impairments and functional limitations.   ===========================================================================================  Eval Complexity: History MEDIUM  Complexity : 1-2 comorbidities / personal factors will impact the outcome/ POC ;  Examination  HIGH Complexity : 4+ Standardized tests and measures addressing body structure, function, activity limitation and / or participation in recreation ; Presentation MEDIUM Complexity : Evolving with changing characteristics ; Decision Making MEDIUM Complexity : FOTO score of 26-74; Overall Complexity MEDIUM  Problem List: pain affecting function, decrease ROM, decrease strength, decrease ADL/ functional abilitiies, decrease activity tolerance and decrease flexibility/ joint mobility   Treatment Plan may include any combination of the following: Therapeutic exercise, Therapeutic activities, Neuromuscular re-education, Physical agent/modality, Manual therapy, Patient education and Functional mobility training  Patient / Family readiness to learn indicated by: asking questions, trying to perform skills and interest  Persons(s) to be included in education: patient (P)  Barriers to Learning/Limitations: no  Measures taken:    Patient Goal (s): Pain free   Patient self reported health status: excellent  Rehabilitation Potential: good   Short Term Goals: To be accomplished in  2  weeks:  1.  Patient will demonstrate independence with HEP for self management of symptoms. 2. Patient will report reduction in pain at worst to 3-4/10 in order to improve sleeping habits.  Long Term Goals: To be accomplished in  4  weeks:  1. Patient will improve FOTO to >/= 64/100 in order to improve quality of life. 2. Patient will report reduction in pain at worst to 1-2/10 in order to improve tolerance to recreational activities. 3. Patient will improve cervical spine AROM to WNL and pain free for all planes in order to return to her PLOF. Frequency / Duration:   Patient to be seen  2  times per week for 4  weeks:  Patient / Caregiver education and instruction: self care, activity modification and exercises  G-Codes (GP): arin  Therapist Signature: Michi Hernandez PT Date: 59/88/6421   Certification Period: 12/31/19-3/29/20 Time: 12:48 PM   ===========================================================================================  I certify that the above Physical Therapy Services are being furnished while the patient is under my care. I agree with the treatment plan and certify that this therapy is necessary. Physician Signature:        Date:       Time:     Please sign and return to [facility] or you may fax the signed copy to [phone]. Thank you.

## 2019-12-31 NOTE — PROGRESS NOTES
PHYSICAL THERAPY - DAILY TREATMENT NOTE    Patient Name: Trina Chaudhary        Date: 2019  : 1955   yes Patient  Verified  Visit #:   1   of   8  Insurance: Payor: Andrés Lott / Plan: [address] [place] [address] [place] H[employer] / Product Type: Managed Care Medicare /      In time: 93 Out time: 6344   Total Treatment Time: 35     Medicare/[facility] Time Tracking (below)   Total Timed Codes (min):  18 1:1 Treatment Time:  35     TREATMENT AREA =  Chronic neck pain [M54.2, G89.29]  Decreased range of motion of neck [R29.898]  Trigger point of right shoulder region [M25.511]    SUBJECTIVE  Pain Level (on 0 to 10 scale):  5  / 10   Medication Changes/New allergies or changes in medical history, any new surgeries or procedures?    no  If yes, update Summary List   Subjective Functional Status/Changes:  []  No changes reported     See POC          OBJECTIVE      8 min Therapeutic Exercise:  [x]  See flow sheet   Rationale:      increase ROM to improve the patients ability to perform household activities. 10 min Manual Therapy: STM to R cervical paraspinals, R UT; manual cervical traction   Rationale:      decrease pain, increase ROM, increase tissue extensibility and decrease trigger points to improve patient's ability to perform recreational activities.      Billed With/As:   [x] TE   [] TA   [] Neuro   [] Self Care Patient Education: [x] Review HEP    [] Progressed/Changed HEP based on:   [] positioning   [] body mechanics   [] transfers   [] heat/ice application    [] other:      Other Objective/Functional Measures:    See POC     Post Treatment Pain Level (on 0 to 10) scale:   5  / 10     ASSESSMENT  Assessment/Changes in Function:     See POC     []  See Progress Note/Recertification   Patient will continue to benefit from skilled PT services to modify and progress therapeutic interventions, address functional mobility deficits, address ROM deficits, address strength deficits, analyze and address soft tissue restrictions, analyze and cue movement patterns, analyze and modify body mechanics/ergonomics and assess and modify postural abnormalities to attain remaining goals.    Progress toward goals / Updated goals:    See POC     PLAN  [x]  Upgrade activities as tolerated yes Continue plan of care   []  Discharge due to :    []  Other:      Therapist: Rg Worthington PT    Date: 12/31/2019 Time: 12:55 PM     Future Appointments   Date Time Provider Hernan Wu   1/7/2020 10:30 AM Andrew Balzarine, PT [facility]   1/10/2020  9:30 AM [place] Balzarine, PT [facility]   1/14/2020 10:30 AM [place] Balzarine, PT [facility]   1/16/2020  9:30 AM [place] Balzarine, PT [facility]   1/21/2020 10:30 AM Nehemias Tyson, PT [facility]   1/23/2020 11:00 AM Nehemias Tyson, PT [facility]   1/28/2020  8:00 AM Hans Maldonado MD [facility]   1/28/2020 10:30 AM Nehemias Tyson, PT [facility]   1/30/2020 11:00 AM Cleopatra Marx, PT [facility]

## 2019-12-31 NOTE — PROGRESS NOTES
Analy Brown is a 71 y.o. female (: 1955) presenting to address:    Chief Complaint   Patient presents with    Back Pain     right upper back that radiates down into right shoulder and arm x 9 days       Vitals:    19 0733   Resp: 16   Weight: 122 lb 12.8 oz (55.7 kg)   Height: 5' 4.5\" (1.638 m)   PainSc:  10 - Worst pain ever   PainLoc: Back       Hearing/Vision:   No exam data present    Learning Assessment:     Learning Assessment 2014   PRIMARY LEARNER Patient   HIGHEST LEVEL OF EDUCATION - PRIMARY LEARNER  4 YEARS OF COLLEGE   BARRIERS PRIMARY LEARNER NONE   PRIMARY LANGUAGE ENGLISH   LEARNER PREFERENCE PRIMARY READING     LISTENING   ANSWERED BY self   RELATIONSHIP SELF     Depression Screening:     3 most recent PHQ Screens 2019   Little interest or pleasure in doing things Not at all   Feeling down, depressed, irritable, or hopeless Not at all   Total Score PHQ 2 0     Fall Risk Assessment:     Fall Risk Assessment, last 12 mths 2019   Able to walk? Yes   Fall in past 12 months? No     Abuse Screening:     Abuse Screening Questionnaire 2019   Do you ever feel afraid of your partner? N   Are you in a relationship with someone who physically or mentally threatens you? N   Is it safe for you to go home? Y     Coordination of Care Questionaire:   1. Have you been to the ER, urgent care clinic since your last visit? Hospitalized since your last visit? YES [facility] 19    2. Have you seen or consulted any other health care providers outside of the [address] [place] [address] since your last visit? Include any pap smears or colon screening. NO    Advanced Directive:   1. Do you have an Advanced Directive? NO    2. Would you like information on Advanced Directives? NO    Pneumo 23 Immunization/s administered 2019 by Dedrick Artegaa LPN with patients consent. Patient tolerated procedure well. No reactions noted.

## 2019-12-31 NOTE — PROGRESS NOTES
[address] [facility]   Primary Care Office Visit - Problem-Oriented    : 1955   Primitivo Ulrich is a 71 y.o. female presenting for  Chief Complaint   Patient presents with    Back Pain     right upper back that radiates down into right shoulder and arm x 9 days            Assessment/Plan:     1. Trigger point of right shoulder region  Initial encounter, in setting of   2. Chronic neck pain  Initial encounter with   3. Decreased range of motion of neck  Chronic.  toradol given in office. Urgent formal PT eval arranged. Stop gabapentin. Start trial of tizanidine PRN. Warned re: sedation and \"trial & error\" for finding the right MSK relaxant (has never had it before). - ketorolac (TORADOL) 30 mg/mL (1 mL) injection; 1 mL by IntraVENous route once for 1 dose. Dispense: 1 Vial; Refill: 0  - KETOROLAC TROMETHAMINE INJ  - [place] THER/PROPH/DIAG INJECTION, SUBCUT/IM  - XR SPINE CERV W OBL/FLEX/EXT MIN 6 V COMP; Future  - tiZANidine (ZANAFLEX) 4 mg tablet; Take 1 Tab by mouth three (3) times daily as needed for Pain (muscle spasms). Dispense: 60 Tab; Refill: 1  - REFERRAL TO PHYSICAL THERAPY    4. Encounter for immunization  - PNEUMOCOCCAL POLYSACCHARIDE VACCINE, 23-VALENT, ADULT OR IMMUNOSUPPRESSED PT DOSE,  - ADMIN PNEUMOCOCCAL VACCINE    Follow-up and Dispositions    · Return in about 4 weeks (around 2020), or if symptoms worsen or fail to improve, for 30min follow-up. Spent 25min face-to-face, >50% spent on counseling, patient education & coordination of care (same day urgent PT eval). Orders & Diagnoses associated with This Encounter:         ICD-10-CM ICD-9-CM   1. Trigger point of right shoulder region M25.511 719.41   2. Chronic neck pain M54.2 723.1    G89.29 338.29   3. Decreased range of motion of neck R29.898 723.8   4.  Encounter for immunization Z23 V03.89       Orders Placed This Encounter    XR SPINE CERV W OBL/FLEX/EXT MIN 6 V COMP     Standing Status:   Future     Number of Occurrences:   1     Standing Expiration Date:   2020     Order Specific Question:   Reason for Exam     Answer:   neck pain     Order Specific Question:   Which facility to perform procedure? Answer:   [facility]    Pneumococcal Polysaccharide vaccine, 23-Valent, Adult or Immunocompromised    [facility] [place]     Referral Priority:   Urgent     Referral Type:   PT/OT/ST     Referral Reason:   Specialty Services Required     Number of Visits Requested:   1    ADMIN PNEUMOCOCCAL VACCINE  Medicare Injection Admin Charge    KETOROLAC TROMETHAMINE INJ     Order Specific Question:   Dose     Answer:   1mL     Order Specific Question:   Site     Answer:   LEFT GLUTEUS     Comments:   IM     Order Specific Question:   Expiration Date     Answer:   2021     Order Specific Question:   Lot#     Answer:   [id number]     Order Specific Question:        Answer:   Rilla Lose     Order Specific Question:   Charge Quantity? Answer:   1     Order Specific Question:   Perfomed by/Witnessed by: Answer:   Veronica Gallegos     Order Specific Question:   NDC#     Answer:   16383-868-41 [[id number]]    [place] THER/PROPH/DIAG INJECTION, SUBCUT/IM    ketorolac (TORADOL) 30 mg/mL (1 mL) injection     Si mL by IntraVENous route once for 1 dose. Dispense:  1 Vial     Refill:  0    tiZANidine (ZANAFLEX) 4 mg tablet     Sig: Take 1 Tab by mouth three (3) times daily as needed for Pain (muscle spasms). Dispense:  60 Tab     Refill:  1         This document may have been created with the aid of dictation software. Text may contain errors, particularly phonetic errors. Reviewed management plan & instructions with patient, who voiced understanding. Davis Chaney MD  Internal Medicine, Family Medicine & Sports Medicine  2019    Subjective   History:    Georgette Umanzor is a 71 y.o. female presenting to address:  Chief Complaint   Patient presents with    Back Pain right upper back that radiates down into right shoulder and arm x 9 days       Seen by Dr. Ghislaine Lopez on 10/16/2019 for zoster, given valtrex. Called after hours line on 12/26/2019 with severe neck & back pain. (see below for notes). since Sun, 12/22. Only on the R side, down the posterior back, arm pit, posterior aspect of R arm. Hx of zoster in Oct.  Was seen by a physician at [facility] on 12/24, and was given diclofenac 50mg to be taken TID prn, but unfortunately does not do anything. Pain is burning, tingling quality. No rash currently. Started on gabapentin. Yesterday pain still significant even with 200mg TID of gabapentin. Instructed to increase to 300mg TID. Gabapentin is of no help. Demonstrates that pain seems to start along medial border of R scapula, feels sharp radiates down posterior aspect of arm past elbow. Past Medical History:   Diagnosis Date    Environmental allergies     outside and inside; [address] [facility]    Glaucoma     Hyperlipidemia     Menopause     Mitral valve prolapse 1980s    s/p cardiac workup; intermittent in nature    Palpitations     PVC (premature ventricular contraction)     S/P cardiac cath 05/16    Mid LAD 40-50% with myocardial bridging    Syncope     last time in 1990's     Past Surgical History:   Procedure Laterality Date    HX HEMORRHOIDECTOMY  1980s    HX TUBAL LIGATION  1985      reports that she has never smoked. She has never used smokeless tobacco. She reports that she does not drink alcohol or use drugs. Social History     Patient does not qualify to have social determinant information on file (likely too young). 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     Now working part-time.    (6/20/2017)     Social History     Tobacco Use   Smoking Status Never Smoker   Smokeless Tobacco Never Used     Family History   Problem Relation Age of Onset   [de-identified] Glaucoma Mother     Hypertension Mother     Glaucoma Father     Cancer Father Colon    Hypertension Father     Asthma Brother     Hypertension Brother      Allergies   Allergen Reactions    Ciprofloxacin Diarrhea    Doxycycline Swelling    Prednisone Diarrhea       Problem List:      Patient Active Problem List    Diagnosis    Coronary artery disease involving native coronary artery of native heart with angina pectoris ([facility] [address]     - cath (5/2/2016): Mild-moderate LAD stenosis along with myocardial bridging, likely explanation for abnormal stress test; Rec: Aggressive medical management, Use of BB for myocardial bridging and mild CAD of BP allow, start low dose statin      Osteopenia     -- DEXA (5/01/2014): Tscores -1.4 @ L1-L4, -1.2 @ L fem neck, -1.9 @ R fem neck; continue Ca/VitD supplementation    - VitD 34.2 (11/21/2015)      Paroxysmal nocturnal dyspnea    Prediabetes    Hyperplastic colon polyp     - c-scope (10/2018): hyperplastic polyp; next due in 5 years, 2023 (Dr. Rena Cedillo)      Recurrent sinusitis     - 3/31/2017 [ENT]: immunotherapy q2wk  - 3/16/2017 [ENT]: s/p balloon sinuplasty of B frontal & maxillary sinuses  - 12/21/2016 [facility]]: CT sinuses, f/u afterwards      Degenerative arthritis of finger    Aspirin intolerance    Postmenopausal state    Cyst of joint of hand    Routine health maintenance     - Pap: NIL and negative co-test (2015); next due 2020  - mammo: BiRAD1 (Nov 2015); BiRAD1  - lipid wnl (5/12/2014)  - VitD 35.1 (5/12/2014)  - CBC, CMP wnl (5/12/2014)  - DEXA: 5/1/2014; repeat in 2yrs (May 2016)      Glaucoma       Medications:     Current Outpatient Medications   Medication Sig    ketorolac (TORADOL) 30 mg/mL (1 mL) injection 1 mL by IntraVENous route once for 1 dose.  tiZANidine (ZANAFLEX) 4 mg tablet Take 1 Tab by mouth three (3) times daily as needed for Pain (muscle spasms).     simvastatin (ZOCOR) 5 mg tablet TAKE 1 TABLET BY MOUTH EVERY DAY AT NIGHT    fluticasone propionate (FLONASE ALLERGY RELIEF) 50 mcg/actuation nasal spray 2 Sprays by Both Nostrils route daily.  metoprolol succinate (TOPROL XL) 25 mg XL tablet Take 1 Tab by mouth daily.  loratadine (CLARITIN) 10 mg tablet Take 10 mg by mouth.  multivitamin with iron (DAILY MULTI-VITAMINS/IRON) tablet Take 1 Tab by mouth daily.  calcium-cholecalciferol, d3, (CALCIUM 600 + D) 600-125 mg-unit Tab Take  by mouth. No current facility-administered medications for this visit. Review of Systems:     Review of Systems   Constitutional: Negative for chills and fever. HENT: Negative for ear pain and sore throat. Respiratory: Negative for cough and shortness of breath. Cardiovascular: Negative for chest pain and palpitations. Gastrointestinal: Negative for abdominal pain. Genitourinary: Negative for dysuria. Musculoskeletal: Positive for myalgias and neck pain. Negative for falls and joint pain. Skin: Negative for rash. Neurological: Negative for tingling, tremors, sensory change, speech change, focal weakness and headaches. Endo/Heme/Allergies: Does not bruise/bleed easily. Psychiatric/Behavioral: Negative for depression. The patient is not nervous/anxious and does not have insomnia. Objective   Physical Assessment:   VS:    Vitals:    12/31/19 0733   BP: 132/71   Pulse: 67   Resp: 16   Temp: 96.9 °F (36.1 °C)   TempSrc: Oral   SpO2: 99%   Weight: 122 lb 12.8 oz (55.7 kg)   Height: 5' 4.5\" (1.638 m)   PainSc:  10 - Worst pain ever   PainLoc: Back       Physical Exam  Nursing note reviewed. Constitutional:       Appearance: She is well-developed. She is not diaphoretic. HENT:      Head: Normocephalic and atraumatic. Neck:      Musculoskeletal: Neck supple. Decreased range of motion (L lateral rotation significantly decreased). Pain with movement and muscular tenderness present. No spinous process tenderness. Thyroid: No thyromegaly. Cardiovascular:      Rate and Rhythm: Normal rate and regular rhythm.       Heart sounds: No murmur. Pulmonary:      Effort: Pulmonary effort is normal.      Breath sounds: Normal breath sounds. No wheezing or rales. Musculoskeletal:      Cervical back: She exhibits decreased range of motion and spasm (R trapezius, R rhomboid, R cervicalis with palpable TrP that causes pain mentioned in CC). Skin:     General: Skin is warm and dry. Findings: No lesion or rash. Neurological:      Mental Status: She is alert and oriented to person, place, and time. Cranial Nerves: No cranial nerve deficit. Sensory: Sensation is intact. Coordination: Coordination normal.      Comments: C5-T1 dermatomes equal bilaterally   Psychiatric:         Behavior: Behavior normal.         Thought Content: Thought content normal.         Judgment: Judgment normal.             Addendum (1/10/2020): Conrado Benavides has been to 3 formal PT sessions with excellent compliance, and has tried thus far 3 different MSK relaxants and gabapentin. PT has documented ongoing concern for cervical radiculopathy on their exam.    Due to uncontrolled pain, will order advanced imaging for possible interventional procedures. Will ask Conrado Benavides to resume gabapentin, but at a higher dose, and will give a short-term course of narcotic for severe uncontrolled pain. Orders Placed This Encounter    MRI CERV SPINE WO CONT     Standing Status:   Future     Standing Expiration Date:   2/10/2021     Scheduling Instructions:      [facility]     Order Specific Question:   Reason for Exam     Answer:   severe neck pain with radiculopathy     Comments:   noted in formal physical therapy documentation    gabapentin (NEURONTIN) 300 mg capsule     Sig: Take 2 Caps by mouth three (3) times daily. Max Daily Amount: 1,800 mg. Dispense:  180 Cap     Refill:  1    HYDROcodone-acetaminophen (NORCO) 5-325 mg per tablet     Sig: Take 1 Tab by mouth every eight (8) hours as needed for Pain for up to 7 days. Max Daily Amount: 3 Tabs. Dispense:  21 Tab     Refill:  0     Adama Leiva MD  Internal Medicine, Family Medicine & Sports Medicine  1/10/2020 7:34 AM       Addendum (1/20/2020):     MRI Results (most recent):  Results from Hospital Encounter encounter on 01/17/20   MRI CERV SPINE WO CONT    Narrative EXAM: MRI OF THE CERVICAL SPINE, WITHOUT IV CONTRAST    CLINICAL INDICATION/HISTORY: severe neck pain with radiculopathy    > Additional: Upper back pain with radiating features into the right shoulder  and right arm    COMPARISON: Cervical radiographs performed 12/31/2019    > Reference Exam: None. TECHNIQUE: T1 weighted sagittal and axial, STIR and T2 FSE sagittal, T2 FSE  axial.  _______________    FINDINGS:    OSSEOUS, CERVICAL ALIGNMENT, CRANIOCERVICAL JUNCTION: There is straightening of  usual cervical lordosis with minor anterolisthesis C4 on C5 and trace  retrolisthesis C5 on C6. The vertebral body heights are preserved. Multilevel  intervertebral disc height loss noted, greatest conspicuity at C5-C6. Additional  upper thoracic intervertebral disc height loss, mild in nature. No suspicious  osseous lesion. No bone marrow signal abnormalities identified to suggest  fracture. Endplate reactive changes noted at C5-C6 with accompanying  degenerative spur formation. Vertebral body marrow signal intensity is normal.   Mild osteoarthritis of the atlantodental articulation is present. CERVICAL CORD, VISUALIZED POSTERIOR FOSSA: Visualized posterior fossa is  unremarkable. No Chiari I malformation. Cord morphology and signal intensity  are unremarkable throughout. PARASPINOUS SOFT TISSUES, VISUALIZED SOFT TISSUE NECK: Visualized soft tissues  are unremarkable. C2-C3: Tiny central disc protrusion. No evidence of spinal canal stenosis. Uncovertebral and facet joints both appear within normal limits. No  neuroforaminal narrowing. C3-C4: Similarly tiny central disc protrusion. No spinal canal stenosis.   Uncovertebral facet joints appear within normal limits. No evidence of  neuroforaminal narrowing. C4-C5: Broad-based disc osteophyte complex. Bilateral uncovertebral facet joints  appear within normal limits. No neuroforaminal narrowing. Slight ventral  impression of the thecal sac at this level without evidence of spinal canal  stenosis. C5-C6: Slight retrolisthesis at this level noted. Disc bulge with right  paracentral disc protrusion in close approximation to the exiting nerve root. Bilateral uncovertebral and facet joint osteoarthritis noted with moderate right  and mild left-sided neuroforaminal narrowing. Potential impression of thecal sac  at the level of the disc osteophyte complex/right paracentral disc protrusion  noted without evidence of spinal canal stenosis. C6-C7: No significant disc pathology. Uncovertebral facet joints appear within  normal limits. No evidence of neuroforaminal narrowing. C7-T1: No significant disc pathology. No evidence of neuroforaminal narrowing. No spinal canal stenosis. _______________      Impression IMPRESSION:      1. Trace anterolisthesis of C4 on C5 with minimal retrolisthesis of C5 on C6,  not significantly changed from flexion/extension cervical spine films of  12/31/2019.    2. Overall mild multilevel spondylosis. No significant spinal canal stenosis. > Disc osteophyte complex and right paracentral disc protrusion at the C5-C6  level noted with abutment/deformation of the exiting right-sided nerve roots. 3. Moderate right and mild left-sided foraminal narrowing C5-C6.      Orders Placed This Encounter    REFERRAL TO PHYSIATRY     Referral Priority:   Urgent     Referral Type:   Consultation     Referral Reason:   Specialty Services Required     Referred to Provider:   Fabiola Fenton DO     Number of Visits Requested:   Carlos Cortez MD  Internal Medicine, Family Medicine & Sports Medicine  1/20/2020 3:31 PM

## 2019-12-31 NOTE — PATIENT INSTRUCTIONS
To Do: - get to [facility] by 11:30am today!  - use the muscle relaxant as needed. .. paying attention to if it helps, and also if it causes excessive sedation. .. use caution! If too much sedation, or doesn't work at all. .. let me know and we can try another one. TESTING RESULTS   Results will be released to [employer]. Normal results will be sent via mail. If you have questions about your results, please schedule a follow up appointment to discuss with your PCP. MEDICATION REFILLS    Please allow at least 2 business days for refill requests to be addressed. Medication refills may be requested through your pharmacy. Refills will not be provided by the after hours/on call provider.

## 2020-01-03 ENCOUNTER — TELEPHONE (OUTPATIENT)
Dept: FAMILY MEDICINE CLINIC | Age: 65
End: 2020-01-03

## 2020-01-03 RX ORDER — METHOCARBAMOL 500 MG/1
500 TABLET, FILM COATED ORAL
Qty: 60 TAB | Refills: 1 | Status: SHIPPED | OUTPATIENT
Start: 2020-01-03 | End: 2020-10-05

## 2020-01-03 NOTE — TELEPHONE ENCOUNTER
Received covermymed for Methocarbamol 500 mg tablet    Started auth and sent to Dr. Bea Neff to finish    Karl Isabella (Key: [id number]) - [id number]  Methocarbamol 500MG tablets  Status: Question Response  Created: January 3rd, 2020 [phone]

## 2020-01-06 NOTE — TELEPHONE ENCOUNTER
Received fax from insurance stating that Methocarbamol 500 mg tablet has been approved good from 1/1/20-1/2/21

## 2020-01-07 ENCOUNTER — HOSPITAL ENCOUNTER (OUTPATIENT)
Dept: PHYSICAL THERAPY | Age: 65
Discharge: HOME | End: 2020-01-07
Payer: MEDICARE

## 2020-01-07 DIAGNOSIS — M54.2 CHRONIC NECK PAIN: ICD-10-CM

## 2020-01-07 DIAGNOSIS — R29.898 DECREASED RANGE OF MOTION OF NECK: ICD-10-CM

## 2020-01-07 DIAGNOSIS — M25.511 TRIGGER POINT OF RIGHT SHOULDER REGION: ICD-10-CM

## 2020-01-07 DIAGNOSIS — G89.29 CHRONIC NECK PAIN: ICD-10-CM

## 2020-01-07 PROCEDURE — 97110 THERAPEUTIC EXERCISES: CPT

## 2020-01-07 PROCEDURE — 97535 SELF CARE MNGMENT TRAINING: CPT

## 2020-01-07 PROCEDURE — 97014 ELECTRIC STIMULATION THERAPY: CPT

## 2020-01-07 PROCEDURE — 97140 MANUAL THERAPY 1/> REGIONS: CPT

## 2020-01-07 RX ORDER — TIZANIDINE 4 MG/1
4 TABLET ORAL
Qty: 90 TAB | Refills: 1 | Status: SHIPPED | OUTPATIENT
Start: 2020-01-07 | End: 2020-02-02

## 2020-01-07 NOTE — PROGRESS NOTES
[facility]   [place] [address], [place] [address],[facility], [facility] - Phone: [phone]  Fax: [phone] FOR USE OF DRY NEEDLING/INTRAMUSCULAR MANUAL THERAPY  Patient Name: Soto Waite : 1955   Treatment/Medical Diagnosis: Neck pain [M54.2]   Referral Source: Mariam Lew MD     Date of Initial Visit: 19 Attended Visits: 2 Missed Visits: 0     Based on findings from the physical therapy examination and evaluation, the evaluating therapist believes the patient, Soto Waite would benefit from including Dry Needling as part of the plan of care. Dry needling is a treatment technique utilized in conjunction with other PT interventions to inactivate myofascial trigger points and the pain and dysfunction they cause. Dry Needling is an advanced procedure that requires additional training of intensive course work. PROCEDURE:          -  Solid filament sterile needle (typically 0.3mm/30 gauge) inserted into a trigger point          -  Repeated movements inactivate the trigger points, taking 30-60 seconds per site  Typically consists of 1 dry needling session per week and a possible second treatment including muscle re-education, flexibility, strengthening and other manual techniques to facilitate the benefits of dry needling  BENEFITS:   Inactivation of trigger points   Decreased pain   Increased muscle length   Improved movement patterns   Restoration of function POTENTIAL RISKS:   Post-needling soreness   Infection   Bruising/bleeding   Penetration of a nerve   Pneumothorax  All treating PTs have been thoroughly educated in avoiding adverse reactions   If you agree with this recommendation, please sign the attached prescription form and fax it to us at [phone]. If you have questions or concerns regarding dry needling or any other treatment we may be providing, please contact us at [phone].   Thank you for allowing us to assist in the care of your patient. Therapist Signature: Isaac Yoder PT Date: 1/7/2020     Time: 11:56 AM   NOTE TO PHYSICIAN:  PLEASE COMPLETE THE ORDERS BELOW AND FAX TO [facility]: [phone]  If you are unable to process this request in 24 hours please contact our office: [phone]  Check box     I have read the above request and AGREE to the recommendation of including dry needling as part of the plan of care. I have read the above request and DO NOT AGREE to including dry needling as part of the plan of care.     I have read the above report and request that my patient continue therapy with the following changes/special instructions: _________________________________________________     Physician Signature:        Date:       Time:

## 2020-01-07 NOTE — PROGRESS NOTES
PHYSICAL THERAPY - DAILY TREATMENT NOTE    Patient Name: Niko Knutson        Date: 2020  : 1955   yes Patient  Verified  Visit #:   2   of   8  Insurance: Payor: Dennis Jolley / Plan: [address] [place] [address] HMO / Product Type: Managed Care Medicare /      In time: 9250 Out time: 8932   Total Treatment Time: 58     Medicare/[facility] Time Tracking (below)   Total Timed Codes (min):  48 1:1 Treatment Time:  48     TREATMENT AREA =  Neck pain [M54.2]    SUBJECTIVE  Pain Level (on 0 to 10 scale):  9  / 10   Medication Changes/New allergies or changes in medical history, any new surgeries or procedures?    no  If yes, update Summary List   Subjective Functional Status/Changes:  []  No changes reported     Patient reports continuing to experience significant pain into her neck and R arm in the evening and at night. Patient has tried taking a muscle relaxer as well as OTC motrin which have minimal effect at this time. Patient also went to the chiropractor last night where she received an adjustment to her neck and upper back, but didn't notice any improvement in symptoms. OBJECTIVE  Modalities Rationale:     decrease inflammation and decrease pain to improve patient's ability to perform self care activities.    10 min [x] Estim, type/location:  IFC to R UT region in semi-reclined                                    []  att     [x]  unatt     []  w/US     []  w/ice    [x]  w/heat    min []  Mechanical Traction: type/lbs                   []  pro   []  sup   []  int   []  cont    []  before manual    []  after manual    min []  Ultrasound, settings/location:      min []  Iontophoresis w/ dexamethasone, location:                                               []  take home patch       []  in clinic    min []  Ice     []  Heat    location/position:     min []  Vasopneumatic Device, press/temp:     min []  Other:    [x] Skin assessment post-treatment (if applicable):    [x]  intact    []  redness- no adverse reaction     []redness  adverse reaction:          33 min Manual Therapy: STM to R cervical paraspinals, R UT/LS, R rhomboids; R cervical spine joint mobilizations, manual cervical traction   Rationale:      decrease pain, increase ROM, increase tissue extensibility and decrease trigger points to improve patient's ability to perform household activities. 15 min Self Care: Discussed with patient and  about current presentation and prognosis moving forward. Also discussed dry needling treatment and possible benefits/risks   Rationale:    reduce patient symptoms for improved QOL     Billed With/As:   [x] TE   [] TA   [] Neuro   [] Self Care Patient Education: [x] Review HEP    [] Progressed/Changed HEP based on:   [] positioning   [] body mechanics   [] transfers   [] heat/ice application    [] other:      Other Objective/Functional Measures:    Patient presenting with significant tenderness to R cervical paraspinals and UT musculature with occasional referral of pain to R UE (triceps) during palpation. Patient noting elevated pain with manual cervical traction, therefore mechanical cervical traction not performed     Post Treatment Pain Level (on 0 to 10) scale:   8  / 10     ASSESSMENT  Assessment/Changes in Function:     Patient and  educated on current symptoms and recommendation for MRI in order to determine cause and extent of current symptoms. Patient presenting with radicular symptoms likely the result of degenerative changes and nerve root impingement.  Plan to attempt dry needling at next session in order to reduce intensity of symptoms and improve quality of life.      []  See Progress Note/Recertification   Patient will continue to benefit from skilled PT services to modify and progress therapeutic interventions, address functional mobility deficits, address ROM deficits, address strength deficits, analyze and address soft tissue restrictions, analyze and cue movement patterns, analyze and modify body mechanics/ergonomics and assess and modify postural abnormalities to attain remaining goals.    Progress toward goals / Updated goals:    No significant progress with goals at this time     PLAN  [x]  Upgrade activities as tolerated yes Continue plan of care   []  Discharge due to :    []  Other:      Therapist: Latisha Alonzo PT    Date: 1/7/2020 Time: 1:27 PM     Future Appointments   Date Time Provider Hernan Wu   1/8/2020  9:30 AM Jerson Sam, PT [facility]   1/10/2020  9:30 AM Marie Martinez, PT [facility]   1/14/2020 10:30 AM Marie Martinez, PT [facility]   1/16/2020  9:30 AM Marie Martinez, PT [facility]   1/21/2020 10:30 AM Michael Sheehan, PT [facility]   1/23/2020 11:00 AM Michael Sheehan, PT [facility]   1/28/2020  8:00 AM Liz Ponce MD [facility]   1/28/2020 10:30 AM Michael Sheehan, PT [facility]   1/30/2020 11:00 AM Indigo Marx, PT [facility]

## 2020-01-08 ENCOUNTER — HOSPITAL ENCOUNTER (OUTPATIENT)
Dept: PHYSICAL THERAPY | Age: 65
Discharge: HOME | End: 2020-01-08
Payer: MEDICARE

## 2020-01-08 PROCEDURE — 97140 MANUAL THERAPY 1/> REGIONS: CPT

## 2020-01-08 NOTE — PROGRESS NOTES
PHYSICAL THERAPY - DAILY TREATMENT NOTE    Patient Name: Guillermina Severin        Date: 2020  : 1955   yes Patient  Verified  Visit #:   3   of   8  Insurance: Payor: Cayla Lynn / Plan: [facility] [place] HMO / Product Type: Managed Care Medicare /      In time: 9:30 Out time: 9:45   Total Treatment Time: 15     Medicare/[facility] Time Tracking (below)   Total Timed Codes (min):  15 1:1 Treatment Time:  15     TREATMENT AREA =  Neck pain [M54.2]  SUBJECTIVE  Pain Level (on 0 to 10 scale):  7  / 10   Medication Changes/New allergies or changes in medical history, any new surgeries or procedures?    no  If yes, update Summary List   Subjective Functional Status/Changes:  []  No changes reported     Not as bad as yesterday, but it still hurts          OBJECTIVE    12 min Manual Therapy: T/s mob   Rationale:      decrease pain, increase ROM and increase tissue extensibility to improve patient's ability to perform ADLs    Dry Needling Procedure Note    Dry Needle Session Number:  1    Procedure: An intramuscular manual therapy (dry needling) and a neuro-muscular re-education treatment was done to deactivate myofascial trigger points, with a 15/30 gauge solid filament needle, under aseptic technique. Indication(s): [] Muscle spasms [] Myalgia/Myositis  [] Muscle cramps      [] Muscle imbalances [] TMD (TMJ) [] Myofascial pain & dysfunction     [] Other: __    Chart reviewed for the following:  Vandana MARTÍNEZ PT, have reviewed the medical history, summary list and precautions/contraindications for Guillermina Severin.     TIME OUT performed immediately prior to start of procedure:  9:33 (enter time the timeout was completed)  Vandana MARTÍNEZ PT, have performed the following reviews on Guillermina Severin prior to the start of the session:      [x] Patient was identified by name and date of birth    [x] Agreement on all muscles being treated was verified   [x] Purpose of dry needling, side effects, possible complications, risks and benefits were explained to the patient   [x] Procedure site(s) verified  [x] Patient was positioned for comfort and draped for privacy  [x] Informed Consent was signed (initial visit) and verified verbally (subsequent visits)  [x] Patient was instructed on the need to report the use of blood thinners and/or immunosuppressant medications  [x] How to respond to possible adverse effects of treatment  [x] Self treatment of post needling soreness: ice, heat (moist heat, heat wraps) and stretching  [x] Opportunity was given to ask any questions, all questions were answered            Treatment:  The following muscles were treated today:    Right: infra   Left:      Patients response to todays treatment:   [x]  LTRs  [x]  Muscle Relaxation  []  Pain Relief  []  Decreased Tinnitus  []  Decreased HAs []  Post needling soreness []  Increased ROM   []  Other:      Actual needle insertion time is not billed     Billed With/As:   [] TE   [] TA   [] Neuro   [] Self Care Patient Education: [x] Review HEP    [] Progressed/Changed HEP based on:   [] positioning   [] body mechanics   [] transfers   [] heat/ice application    [] other:      Other Objective/Functional Measures:    Pt reports increase in UE symptom with palpation to R infraspinatus and no change with spurlings, however Pt reports increase in UE symptom with ext and spurling after DN     Post Treatment Pain Level (on 0 to 10) scale:   7  / 10     ASSESSMENT  Assessment/Changes in Function:     Poor dominik to DN to infraspinatus     []  See Progress Note/Recertification   Patient will continue to benefit from skilled PT services to modify and progress therapeutic interventions, address functional mobility deficits and address ROM deficits to attain remaining goals.    Progress toward goals / Updated goals:    No change towards LTGs today     PLAN  [x]  Upgrade activities as tolerated yes Continue plan of care   []  Discharge due to : []  Other:      Therapist: Charles Klein, PT    Date: 1/8/2020 Time: 6:30 AM     Future Appointments   Date Time Provider Hernan Jazmin   1/8/2020  9:30 AM Barbara Suarez, PT [facility]   1/10/2020  9:30 AM Carlo Queen, PT [facility]   1/14/2020 10:30 AM Carlo Queen, PT [facility]   1/16/2020  9:30 AM Carlo Queen, PT [facility]   1/21/2020 10:30 AM Naseem Jones, PT [facility]   1/23/2020 11:00 AM Naseem Jones, PT [facility]   1/28/2020  8:00 AM Melonie Soria MD [facility]   1/28/2020 10:30 AM Naseem Jones, PT [facility]   1/30/2020 11:00 AM Isabela Marx, PT [facility]

## 2020-01-09 NOTE — PROGRESS NOTES
[address]  [facility] THERAPY  [address] [address],[facility], [address] [facility] - Phone: [phone]  Fax: [phone]  PROGRESS NOTE  Patient Name: Hector Quintana : 1955   Treatment/Medical Diagnosis: Neck pain [M54.2]   Referral Source: Clifford Tao MD     Date of Initial Visit: 19 Attended Visits: 3 Missed Visits: 0     SUMMARY OF TREATMENT  Patient has attended 2 follow up visits since her initial evaluation on 19. Patient has received therapeutic exercise and manual therapy including dry needling in order to improve cervical spine ROM, mobility, flexibility and pain reduction. CURRENT STATUS  Patient has been treated at 2 additional follow up appointment in order to address cervical spine and radicular symptoms into R UE. Despite regular performance of her HEP as well as manual therapy (including dry needling), patient continues to report high pain levels reaching 8-9/10. Based upon these findings as well as duration of patient's symptoms since 19, she would benefit from advanced imaging such as an MRI to determine severity of her injury. RECOMMENDATIONS  Patient would benefit from MRI at this time in order to further assess symptoms and determine most appropriate treatment for her current presentation. If you have any questions/comments please contact us directly at [phone]. Thank you for allowing us to assist in the care of your patient. Therapist Signature:  Wali Sandoval PT Date: 2020     Time: 2:13 PM   NOTE TO PHYSICIAN:  PLEASE COMPLETE THE ORDERS BELOW AND FAX TO   [facility] Physical Therapy: [phone]  If you are unable to process this request in 24 hours please contact our office: [phone]    ___ I have read the above report and request that my patient continue as recommended.   ___ I have read the above report and request that my patient continue therapy with the following changes/special instructions:_________________________________________________________   ___ I have read the above report and request that my patient be discharged from therapy.      Physician Signature:        Date:       Time:

## 2020-01-10 ENCOUNTER — HOSPITAL ENCOUNTER (OUTPATIENT)
Dept: PHYSICAL THERAPY | Age: 65
Discharge: HOME | End: 2020-01-10
Payer: MEDICARE

## 2020-01-10 PROCEDURE — 97140 MANUAL THERAPY 1/> REGIONS: CPT

## 2020-01-10 PROCEDURE — 97014 ELECTRIC STIMULATION THERAPY: CPT

## 2020-01-10 RX ORDER — HYDROCODONE BITARTRATE AND ACETAMINOPHEN 5; 325 MG/1; MG/1
1 TABLET ORAL
Qty: 21 TAB | Refills: 0 | Status: SHIPPED | OUTPATIENT
Start: 2020-01-10 | End: 2020-01-17

## 2020-01-10 RX ORDER — GABAPENTIN 300 MG/1
600 CAPSULE ORAL 3 TIMES DAILY
Qty: 180 CAP | Refills: 1 | Status: SHIPPED | OUTPATIENT
Start: 2020-01-10 | End: 2020-01-17

## 2020-01-10 NOTE — PROGRESS NOTES
PHYSICAL THERAPY - DAILY TREATMENT NOTE    Patient Name: Flavia Sanchez        Date: 1/10/2020  : 1955   yes Patient  Verified  Visit #:   4   of   8  Insurance: Payor: Leann Black / Plan: [address] [place], [place] [address] HMO / Product Type: Managed Care Medicare /      In time: 034 Out time: 1016   Total Treatment Time: 46     Medicare/[employer] Time Tracking (below)   Total Timed Codes (min):  31 1:1 Treatment Time:  31     TREATMENT AREA =  Neck pain [M54.2]    SUBJECTIVE  Pain Level (on 0 to 10 scale):  -10  / 10   Medication Changes/New allergies or changes in medical history, any new surgeries or procedures?    no  If yes, update Summary List   Subjective Functional Status/Changes:  []  No changes reported     Patient states \"I didn't do well\" after her dry needling session. Patient states her gabapentin prescription was increased and is hopeful this will better manage her pain. Patient is experiencing her pain in her R shoulder blade and down her arm. OBJECTIVE  Modalities Rationale:     decrease inflammation and decrease pain to improve patient's ability to perform self care activities.    15 min [x] Estim, type/location:  IFC to R UT and scapula in supine with bolster                                    []  att     [x]  unatt     []  w/US     []  w/ice    [x]  w/heat    min []  Mechanical Traction: type/lbs                   []  pro   []  sup   []  int   []  cont    []  before manual    []  after manual    min []  Ultrasound, settings/location:      min []  Iontophoresis w/ dexamethasone, location:                                               []  take home patch       []  in clinic    min []  Ice     []  Heat    location/position:     min []  Vasopneumatic Device, press/temp:     min []  Other:    [x] Skin assessment post-treatment (if applicable):    [x]  intact    []  redness- no adverse reaction     []redness  adverse reaction:          31 min Manual Therapy: STM to R cervical paraspinals, R infraspinatus, R teres minor, cervical spine joint mobs    Rationale:      decrease pain, increase ROM, increase tissue extensibility and decrease trigger points to improve patient's ability to perform household activities. Billed With/As:   [] TE   [] TA   [] Neuro   [] Self Care Patient Education: [x] Review HEP    [] Progressed/Changed HEP based on:   [] positioning   [] body mechanics   [] transfers   [] heat/ice application    [] other:      Other Objective/Functional Measures:    Significant tenderness noted to L infraspinatus during MT treatment  Improving soft tissue extensibility to R cervical paraspinals compared to 1st follow up visit     Post Treatment Pain Level (on 0 to 10) scale:   7  / 10     ASSESSMENT  Assessment/Changes in Function:     Patient noted a slight improvement in symptoms post session. Patient appropriate for MRI of cervical spine in order to determine severity of cervical radiculopathy symptoms. []  See Progress Note/Recertification   Patient will continue to benefit from skilled PT services to modify and progress therapeutic interventions, address functional mobility deficits, address ROM deficits, address strength deficits, analyze and address soft tissue restrictions, analyze and cue movement patterns, analyze and modify body mechanics/ergonomics and assess and modify postural abnormalities to attain remaining goals.    Progress toward goals / Updated goals:    Minimal progress with pain reduction between sessions     PLAN  [x]  Upgrade activities as tolerated yes Continue plan of care   []  Discharge due to :    []  Other:      Therapist: Ro Cantrell PT    Date: 1/10/2020 Time: 11:05 AM     Future Appointments   Date Time Provider Hernan Wu   1/14/2020 10:30 AM Benito Lau PT [facility]   1/16/2020  9:30 AM Benito Lau PT [facility]   1/20/2020 12:45 PM [facility] MRI RM 1 [facility]   1/21/2020 10:30 AM Veda Marx, PT [facility] 1/23/2020 11:00 AM Carolina Aburto, PT [facility]   1/28/2020  8:00 AM Danita Paulson MD [facility]   1/28/2020 10:30 AM Carolina Aburto, PT [facility]   1/30/2020 11:00 AM Margaret Marx, PT [facility]

## 2020-01-14 ENCOUNTER — HOSPITAL ENCOUNTER (OUTPATIENT)
Dept: PHYSICAL THERAPY | Age: 65
Discharge: HOME | End: 2020-01-14
Payer: MEDICARE

## 2020-01-14 PROCEDURE — 97140 MANUAL THERAPY 1/> REGIONS: CPT

## 2020-01-14 NOTE — PROGRESS NOTES
PHYSICAL THERAPY - DAILY TREATMENT NOTE    Patient Name: Torres Call        Date: 2020  : 1955   yes Patient  Verified  Visit #:   5   of   8  Insurance: Payor: Messi Hickey / Plan: [address] [place] [address] HMO / Product Type: Managed Care Medicare /      In time: 1470 Out time: 1101   Total Treatment Time: 30     Medicare/[facility] Time Tracking (below)   Total Timed Codes (min):  30 1:1 Treatment Time:  30     TREATMENT AREA =  Neck pain [M54.2]    SUBJECTIVE  Pain Level (on 0 to 10 scale):  8-9  / 10   Medication Changes/New allergies or changes in medical history, any new surgeries or procedures?    no  If yes, update Summary List   Subjective Functional Status/Changes:  []  No changes reported     Patient reports her symptoms have improved some after increasing her gabapentin medication. Patient states whenever she does any activities involving her arm, her pain will increase and she needs to sit down somewhere to rest.           OBJECTIVE      30 min Manual Therapy: STM to R infraspinatus, R teres minor, R rhomboids; STM to R cervical paraspinals; manual cervical traction   Rationale:      decrease pain, increase ROM, increase tissue extensibility and decrease trigger points to improve patient's ability to perform household activities. Billed With/As:   [x] TE   [] TA   [] Neuro   [] Self Care Patient Education: [x] Review HEP    [] Progressed/Changed HEP based on:   [] positioning   [] body mechanics   [] transfers   [] heat/ice application    [] other:      Other Objective/Functional Measures:    Continued tenderness to R infraspinatus, R rhomboids with increased pain during palpation  (+) Spurlings on the R, (+) empty can and speeds test on the R     Post Treatment Pain Level (on 0 to 10) scale:   7  / 10     ASSESSMENT  Assessment/Changes in Function:     Patient instructed to hold on PT appointments until after the week of 20 while MRI results are confirmed.  MRI will help rule out possible cervical nerve root impingement. []  See Progress Note/Recertification   Patient will continue to benefit from skilled PT services to modify and progress therapeutic interventions, address functional mobility deficits, address ROM deficits, address strength deficits, analyze and address soft tissue restrictions, analyze and cue movement patterns, analyze and modify body mechanics/ergonomics and assess and modify postural abnormalities to attain remaining goals.    Progress toward goals / Updated goals:    Hold on PT appointments until after MRI is performed     PLAN  [x]  Upgrade activities as tolerated yes Continue plan of care   []  Discharge due to :    []  Other:      Therapist: Althea Beard, PT    Date: 1/14/2020 Time: 1:01 PM     Future Appointments   Date Time Provider Hernan Wu   1/16/2020  9:30 AM Doreatha Lefort, PT [facility]   1/20/2020 12:45 PM [facility] MRI RM 1 [facility]   1/28/2020  8:00 AM Guicho Grey MD [facility]   1/28/2020 10:30 AM Odilia Fang, PT [facility]   1/30/2020 11:00 AM Mac Marx, PT [facility]

## 2020-01-16 ENCOUNTER — APPOINTMENT (OUTPATIENT)
Dept: PHYSICAL THERAPY | Age: 65
End: 2020-01-16
Payer: MEDICARE

## 2020-01-17 ENCOUNTER — HOSPITAL ENCOUNTER (OUTPATIENT)
Dept: MRI IMAGING | Age: 65
Discharge: HOME | End: 2020-01-17
Attending: FAMILY MEDICINE
Payer: MEDICARE

## 2020-01-17 DIAGNOSIS — M54.12 CERVICAL RADICULOPATHY: ICD-10-CM

## 2020-01-17 DIAGNOSIS — R29.898 DECREASED RANGE OF MOTION OF NECK: ICD-10-CM

## 2020-01-17 DIAGNOSIS — Z23 ENCOUNTER FOR IMMUNIZATION: ICD-10-CM

## 2020-01-17 DIAGNOSIS — G89.29 CHRONIC NECK PAIN: ICD-10-CM

## 2020-01-17 DIAGNOSIS — M54.2 CHRONIC NECK PAIN: ICD-10-CM

## 2020-01-17 PROCEDURE — 72141 MRI NECK SPINE W/O DYE: CPT

## 2020-01-17 RX ORDER — GABAPENTIN 300 MG/1
900 CAPSULE ORAL 3 TIMES DAILY
Qty: 270 CAP | Refills: 1 | Status: SHIPPED | OUTPATIENT
Start: 2020-01-17 | End: 2020-10-05

## 2020-01-20 ENCOUNTER — TELEPHONE (OUTPATIENT)
Dept: FAMILY MEDICINE CLINIC | Age: 65
End: 2020-01-20

## 2020-01-20 NOTE — TELEPHONE ENCOUNTER
Please call Guillermina Severin. MRI shows a disc herniation plus some arthritis that are pinching a nerve at the C5/6 level. I'd like to refer her to physiatry for consultation for possible epidural steroid injection. Is she okay with that? Also, does she have a preference or does she want me to pick? Also please ask if PT is helping at all. If not, we can place a hold on it and then continue after eval by physiatry if they think she would benefit. Thanks.

## 2020-01-21 ENCOUNTER — APPOINTMENT (OUTPATIENT)
Dept: PHYSICAL THERAPY | Age: 65
End: 2020-01-21
Payer: MEDICARE

## 2020-01-21 NOTE — TELEPHONE ENCOUNTER
Spoke with patient and she stated she couldn't see her MRI results. I told patient it should be in her my chart, also when she has appointment on Thursday   A copy was faxed over. Told patient she could give me a call back if she is still having trouble seeing results or if she can't get a copy at her office visit.

## 2020-01-21 NOTE — TELEPHONE ENCOUNTER
Patient called back and wanted to speak with Adam Marcum who called her last. She stated that she didn't really understand what the nurse was saying and wanted more clarification. Please advise.

## 2020-01-21 NOTE — TELEPHONE ENCOUNTER
I called Dr. Tal Morin office and spoke with [facility] for January 23rd @ 1100  Spoke with patient and gave information. ( Number and address )      Also faxed office notes, referral, and imaging.

## 2020-01-23 ENCOUNTER — APPOINTMENT (OUTPATIENT)
Dept: PHYSICAL THERAPY | Age: 65
End: 2020-01-23
Payer: MEDICARE

## 2020-01-28 ENCOUNTER — APPOINTMENT (OUTPATIENT)
Dept: PHYSICAL THERAPY | Age: 65
End: 2020-01-28
Payer: MEDICARE

## 2020-01-30 ENCOUNTER — HOSPITAL ENCOUNTER (OUTPATIENT)
Dept: PHYSICAL THERAPY | Age: 65
End: 2020-01-30
Payer: MEDICARE

## 2020-02-02 DIAGNOSIS — M54.2 CHRONIC NECK PAIN: ICD-10-CM

## 2020-02-02 DIAGNOSIS — R29.898 DECREASED RANGE OF MOTION OF NECK: ICD-10-CM

## 2020-02-02 DIAGNOSIS — M25.511 TRIGGER POINT OF RIGHT SHOULDER REGION: ICD-10-CM

## 2020-02-02 DIAGNOSIS — G89.29 CHRONIC NECK PAIN: ICD-10-CM

## 2020-02-02 RX ORDER — TIZANIDINE 4 MG/1
TABLET ORAL
Qty: 60 TAB | Refills: 1 | Status: SHIPPED | OUTPATIENT
Start: 2020-02-02 | End: 2020-10-05

## 2020-02-10 DIAGNOSIS — M54.12 CERVICAL RADICULOPATHY: ICD-10-CM

## 2020-02-10 RX ORDER — GABAPENTIN 300 MG/1
900 CAPSULE ORAL 3 TIMES DAILY
Qty: 810 CAP | Refills: 1 | OUTPATIENT
Start: 2020-02-10

## 2020-02-10 NOTE — TELEPHONE ENCOUNTER
Please call and verify how Arlet John is currently taking her gabapentin before we refill it. Thanks!

## 2020-02-10 NOTE — TELEPHONE ENCOUNTER
Has she had a steroid injection yet? If not, she should ask Dr. Tayo Villegas opinion (although with how much pain she was in, I'd tend to lean towards the \"you should probably get the injection\" side).

## 2020-02-10 NOTE — TELEPHONE ENCOUNTER
Patient states she is currently not taking the Gabapentin, she stopped yesterday after weaning herself off as well as the muscle relaxer. She states she doesn't need a refills and also wants to know if she should have the steroid injection tomorrow since she is feeling so much better.

## 2020-02-10 NOTE — TELEPHONE ENCOUNTER
received 90 day supply request from pharmacy.     Last seen 12/31/19    Last written 1/17/20 Qty 270 w/ 1 refill

## 2020-02-14 NOTE — PROGRESS NOTES
[facility] PHYSICAL THERAPY  [address] [place] [address] [address], [address],[place], [address] - Phone: [phone]  Fax: [phone] FOR PHYSICAL THERAPY          Patient Name: Lacey Langley :    Treatment/Medical Diagnosis: Neck pain [M54.2]   Onset Date: 19    Referral Source: Danita Paulson MD [facility]): 19   Prior Hospitalization: See Medical History Provider #: [id number]   Prior Level of Function: No difficulty with ADL's   Comorbidities: Heart Disease, Arthritis   Medications: Verified on Patient Summary List   Visits from [facility]: 5 Missed Visits: 0   Key Functional Changes/Progress: Patient was placed on hold after her 20 appointment due to continued high levels of pain with minimal change in symptoms during PT sessions. Patient was scheduled to receive an MRI and follow up with MD. At this time, patient will be discharged from PT.     G-Codes (GP): na  Assessments/Recommendations: Other: No change in symptoms    If you have any questions/comments please contact us directly at [phone]. Thank you for allowing us to assist in the care of your patient. Therapist Signature:  Shira Nguyen PT Date: 20   Reporting Period: 19-20 Time: 12:47 PM

## 2020-04-14 ENCOUNTER — PATIENT MESSAGE (OUTPATIENT)
Dept: FAMILY MEDICINE CLINIC | Age: 65
End: 2020-04-14

## 2020-04-14 RX ORDER — METOPROLOL SUCCINATE 25 MG/1
25 TABLET, EXTENDED RELEASE ORAL DAILY
Qty: 90 TAB | Refills: 3 | Status: SHIPPED | OUTPATIENT
Start: 2020-04-14 | End: 2021-04-08

## 2020-04-14 NOTE — TELEPHONE ENCOUNTER
From: Leo Steele  To: Aly Gutierres MD  Sent: 4/14/2020 11:31 AM EDT  Subject: Prescription Question    May I please have refill for Metoprolol ER 25 mg 1 tab daily? I use the same pharmacy [facility] on [address]. Thank you.     Lauren Sen

## 2020-07-10 ENCOUNTER — TELEPHONE (OUTPATIENT)
Dept: FAMILY MEDICINE CLINIC | Age: 65
End: 2020-07-10

## 2020-07-10 RX ORDER — LOPERAMIDE HCL 2 MG
2 TABLET ORAL
Qty: 20 TAB | Refills: 0 | Status: SHIPPED | OUTPATIENT
Start: 2020-07-10 | End: 2020-07-20

## 2020-09-11 ENCOUNTER — OFFICE VISIT (OUTPATIENT)
Dept: FAMILY MEDICINE CLINIC | Age: 65
End: 2020-09-11

## 2020-09-11 ENCOUNTER — TELEPHONE (OUTPATIENT)
Dept: FAMILY MEDICINE CLINIC | Age: 65
End: 2020-09-11

## 2020-09-11 VITALS
BODY MASS INDEX: 20.73 KG/M2 | HEIGHT: 65 IN | SYSTOLIC BLOOD PRESSURE: 136 MMHG | RESPIRATION RATE: 13 BRPM | WEIGHT: 124.4 LBS | DIASTOLIC BLOOD PRESSURE: 82 MMHG | TEMPERATURE: 97.3 F | HEART RATE: 62 BPM | OXYGEN SATURATION: 99 %

## 2020-09-11 DIAGNOSIS — M54.50 ACUTE LEFT-SIDED LOW BACK PAIN WITHOUT SCIATICA: Primary | ICD-10-CM
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
## 2020-09-11 DIAGNOSIS — M25.542 PAIN INVOLVING JOINT OF FINGER OF LEFT HAND: ICD-10-CM
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
## 2020-09-11 NOTE — PROGRESS NOTES
Betty Kennedy is a 71 y.o. female (: 1955) presenting to address:    Chief Complaint   Patient presents with    Fall     fell on 2020; fell off bed and hit wooden step stool       Vitals:    20 1137   BP: 136/82   Pulse: 62   Resp: 13   Temp: 97.3 °F (36.3 °C)   TempSrc: Temporal   SpO2: 99%   Weight: 124 lb 6.4 oz (56.4 kg)   Height: 5' 4.5\" (1.638 m)   PainSc:   4   PainLoc: Finger       Hearing/Vision:   No exam data present    Learning Assessment:     Learning Assessment 2014   PRIMARY LEARNER Patient   HIGHEST LEVEL OF EDUCATION - PRIMARY LEARNER  4 YEARS OF COLLEGE   BARRIERS PRIMARY LEARNER NONE   PRIMARY LANGUAGE ENGLISH   LEARNER PREFERENCE PRIMARY READING     LISTENING   ANSWERED BY self   RELATIONSHIP SELF     Depression Screening:     3 most recent PHQ Screens 2020   Little interest or pleasure in doing things Not at all   Feeling down, depressed, irritable, or hopeless Not at all   Total Score PHQ 2 0     Fall Risk Assessment:     Fall Risk Assessment, last 12 mths 2020   Able to walk? Yes   Fall in past 12 months? Yes   Fall with injury? Yes   Number of falls in past 12 months 1   Fall Risk Score 2     Abuse Screening:     Abuse Screening Questionnaire 2019   Do you ever feel afraid of your partner? N   Are you in a relationship with someone who physically or mentally threatens you? N   Is it safe for you to go home? Y     Coordination of Care Questionaire:   1. Have you been to the ER, urgent care clinic since your last visit? Hospitalized since your last visit? NO    2. Have you seen or consulted any other health care providers outside of the [address] [place] [address] since your last visit? Include any pap smears or colon screening. YES, epidural for back pain    Advanced Directive:   1. Do you have an Advanced Directive? NO    2. Would you like information on Advanced Directives?  NO

## 2020-09-11 NOTE — PROGRESS NOTES
KIYA Partida is a 71y.o. year old female who presents today for evaluation after a fall. She didn't realize she was on the edge of the bed and rolled off as she was trying to sit up. There was a wooden foot stool next to the bed that she fell on, hitting the left side of her back. Has large bruise on  back, very tender to touch. Tried ice X1 when it happened with mild relief. Also hit L 5th digit on hand. Finger is now swollen, a little bruised and hurts with movement. Tylenol with minimal relief. Past Medical History:   Diagnosis Date    Environmental allergies     outside and inside; [address] Allergy    Glaucoma     Hyperlipidemia     Menopause     Mitral valve prolapse 1980s    s/p cardiac workup; intermittent in nature    Palpitations     PVC (premature ventricular contraction)     S/P cardiac cath 05/16    Mid LAD 40-50% with myocardial bridging    Syncope     last time in 1990's     Past Surgical History:   Procedure Laterality Date    HX HEMORRHOIDECTOMY  1980s    HX TUBAL LIGATION  1985     Social History     Tobacco Use    Smoking status: Never Smoker    Smokeless tobacco: Never Used   Substance Use Topics    Alcohol use: No    Drug use: No       Current Outpatient Medications:     metoprolol succinate (Toprol XL) 25 mg XL tablet, Take 1 Tab by mouth daily. , Disp: 90 Tab, Rfl: 3    tiZANidine (ZANAFLEX) 4 mg tablet, TAKE 1 TABLET BY MOUTH THREE TIMES A DAY AS NEEDED FOR MUSCLE SPASMS, Disp: 60 Tab, Rfl: 1    gabapentin (NEURONTIN) 300 mg capsule, Take 3 Caps by mouth three (3) times daily. Max Daily Amount: 2,700 mg., Disp: 270 Cap, Rfl: 1    methocarbamol (ROBAXIN) 500 mg tablet, Take 1 Tab by mouth three (3) times daily as needed (muscle spasms). , Disp: 60 Tab, Rfl: 1    simvastatin (ZOCOR) 5 mg tablet, TAKE 1 TABLET BY MOUTH EVERY DAY AT NIGHT, Disp: 90 Tab, Rfl: 1    fluticasone propionate (FLONASE ALLERGY RELIEF) 50 mcg/actuation nasal spray, 2 Sprays by Both Nostrils route daily. , Disp: , Rfl:     loratadine (CLARITIN) 10 mg tablet, Take 10 mg by mouth., Disp: , Rfl:     multivitamin with iron (DAILY MULTI-VITAMINS/IRON) tablet, Take 1 Tab by mouth daily. , Disp: , Rfl:     calcium-cholecalciferol, d3, (CALCIUM 600 + D) 600-125 mg-unit Tab, Take  by mouth., Disp: , Rfl:      Allergies   Allergen Reactions    Ciprofloxacin Diarrhea    Doxycycline Swelling    Prednisone Diarrhea       Review of Systems   Constitutional: Negative for chills and fever. Respiratory: Negative for cough and shortness of breath. Cardiovascular: Negative for chest pain and palpitations. Musculoskeletal:        See HPI         Physical Exam  /82 (BP 1 Location: Left arm, BP Patient Position: Sitting)   Pulse 62   Temp 97.3 °F (36.3 °C) (Temporal)   Resp 13   Ht 5' 4.5\" (1.638 m)   Wt 124 lb 6.4 oz (56.4 kg)   SpO2 99%   BMI 21.02 kg/m²         Physical Exam  Constitutional:       General: She is not in acute distress. Appearance: Normal appearance. HENT:      Head: Normocephalic and atraumatic. Cardiovascular:      Rate and Rhythm: Normal rate and regular rhythm. Heart sounds: No murmur. No gallop. Pulmonary:      Effort: Pulmonary effort is normal.      Breath sounds: Normal breath sounds. Musculoskeletal:        Back:         Arms:       Comments: L 5th digit with minor swelling at PIP joint, mildly tender to palpation, ecchymosis present, decreased ROM with flexion   Skin:     General: Skin is warm and dry. Neurological:      Mental Status: She is alert.              Assessment  Fall at home  Back pain  L 5th digit pain      Plan  Xray of ribs and chest   Xray L 5th digit  Declines RX NSAID at this time, tylenol PRN  Ice to both areas X20 minutes at least twice a day  Salon pas lidocaine patches to back PRN  Follow up pending xrays

## 2020-09-11 NOTE — PATIENT INSTRUCTIONS
Ice X20 minutes 2-3X/day Salon pas lidocaine patches as needed Splint to L fifth digit Tylenol as needed

## 2020-10-05 ENCOUNTER — OFFICE VISIT (OUTPATIENT)
Dept: FAMILY MEDICINE CLINIC | Age: 65
End: 2020-10-05
Payer: MEDICARE

## 2020-10-05 VITALS
DIASTOLIC BLOOD PRESSURE: 85 MMHG | HEIGHT: 65 IN | HEART RATE: 61 BPM | RESPIRATION RATE: 16 BRPM | OXYGEN SATURATION: 100 % | WEIGHT: 125.8 LBS | SYSTOLIC BLOOD PRESSURE: 137 MMHG | TEMPERATURE: 97.5 F | BODY MASS INDEX: 20.96 KG/M2

## 2020-10-05 DIAGNOSIS — Z78.0 POSTMENOPAUSAL STATE: ICD-10-CM

## 2020-10-05 DIAGNOSIS — Z78.9 STATIN INTOLERANCE: ICD-10-CM

## 2020-10-05 DIAGNOSIS — Z12.31 ENCOUNTER FOR SCREENING MAMMOGRAM FOR MALIGNANT NEOPLASM OF BREAST: ICD-10-CM

## 2020-10-05 DIAGNOSIS — Z78.9 ASPIRIN INTOLERANCE: ICD-10-CM

## 2020-10-05 DIAGNOSIS — I25.119 CORONARY ARTERY DISEASE INVOLVING NATIVE CORONARY ARTERY OF NATIVE HEART WITH ANGINA PECTORIS (HCC): Chronic | ICD-10-CM

## 2020-10-05 DIAGNOSIS — Z00.00 INITIAL MEDICARE ANNUAL WELLNESS VISIT: Primary | ICD-10-CM

## 2020-10-05 DIAGNOSIS — Z00.00 ROUTINE ADULT HEALTH MAINTENANCE: ICD-10-CM

## 2020-10-05 DIAGNOSIS — Z13.39 SCREENING FOR ALCOHOLISM: ICD-10-CM

## 2020-10-05 DIAGNOSIS — M85.80 OSTEOPENIA, UNSPECIFIED LOCATION: Chronic | ICD-10-CM

## 2020-10-05 DIAGNOSIS — L98.9 SKIN LESION OF CHEST WALL: ICD-10-CM

## 2020-10-05 DIAGNOSIS — R73.03 PREDIABETES: ICD-10-CM

## 2020-10-05 PROCEDURE — 99213 OFFICE O/P EST LOW 20 MIN: CPT | Performed by: FAMILY MEDICINE

## 2020-10-05 PROCEDURE — G0438 PPPS, INITIAL VISIT: HCPCS | Performed by: FAMILY MEDICINE

## 2020-10-05 PROCEDURE — G9899 SCRN MAM PERF RSLTS DOC: HCPCS | Performed by: FAMILY MEDICINE

## 2020-10-05 PROCEDURE — 3288F FALL RISK ASSESSMENT DOCD: CPT | Performed by: FAMILY MEDICINE

## 2020-10-05 PROCEDURE — G8420 CALC BMI NORM PARAMETERS: HCPCS | Performed by: FAMILY MEDICINE

## 2020-10-05 PROCEDURE — 1100F PTFALLS ASSESS-DOCD GE2>/YR: CPT | Performed by: FAMILY MEDICINE

## 2020-10-05 PROCEDURE — G8510 SCR DEP NEG, NO PLAN REQD: HCPCS | Performed by: FAMILY MEDICINE

## 2020-10-05 PROCEDURE — G8427 DOCREV CUR MEDS BY ELIG CLIN: HCPCS | Performed by: FAMILY MEDICINE

## 2020-10-05 PROCEDURE — 3017F COLORECTAL CA SCREEN DOC REV: CPT | Performed by: FAMILY MEDICINE

## 2020-10-05 PROCEDURE — G8536 NO DOC ELDER MAL SCRN: HCPCS | Performed by: FAMILY MEDICINE

## 2020-10-05 PROCEDURE — G8399 PT W/DXA RESULTS DOCUMENT: HCPCS | Performed by: FAMILY MEDICINE

## 2020-10-05 PROCEDURE — 1090F PRES/ABSN URINE INCON ASSESS: CPT | Performed by: FAMILY MEDICINE

## 2020-10-05 NOTE — PATIENT INSTRUCTIONS
To Do: - work on the core strengthening below. Also consider working on [employer] (look them up on the internet!) Notes from your doctor: - [facility] central scheduling should call you within the next 3-5 business days to schedule your bone density & mammogram.  If you don't hear from them, you can call [facility] central scheduling @ [phone]. 
- plan on doing your fasting bloodwork at the outpatient lab at [place] [address] at the same time as your mammogram and bone density - The dermatology office should be calling you to schedule in the next 1-2 weeks. If you don't hear from them after 2 weeks, call their office directly to check on the status of your referral. 
 
 
 
Medicare Wellness Notes: - Included below is some information about Advance Directives. There is also an excellent website for it for the state of [place] (www. [email or web address]); if you end up making an Advance Directive, bring me a copy so I can put it in your medical record - We did your medicare wellness visit today. Below is your \"5 year preventive services plan\" - Per our records, you are behind on some immunizations. Since you have Medicare, it would be more affordable for you to obtain this immunizations from your local commerical pharmacy (since your Medicare Part D will cover them). Please bring this to your local pharmacy to show them. If you decide to get your vaccines, please ask them to fax our office a copy of the records so our information is also up-to-date. My direct fax is [phone] Health Maintenance Due Topic Date Due  
 DTaP/Tdap/Td  (1 - Tdap) 09/18/1975  Shingles Vaccine (1 of 2) 09/18/2004 Abdominal Strain: Rehab Exercises Introduction Here are some examples of exercises for you to try. The exercises may be suggested for a condition or for rehabilitation. Start each exercise slowly. Ease off the exercises if you start to have pain. You will be told when to start these exercises and which ones will work best for you. How to do the exercises Tummy mehran 1. Lie on your back with your knees bent. Place two fingers just inside your hip bones so you can feel your lower belly muscles. 2. Take a deep breath in. 
3. As you breathe out, pull your belly button in toward your spine, as if you are trying to zip up a tight pair of jeans. You should feel your lower belly muscles pull slightly away from your fingers as the muscles tighten. 4. Hold for about 6 seconds, but do not hold your breath. 5. Relax up to 10 seconds. 6. Repeat 8 to 12 times. 7. Repeat several times a day, and try to hold your lower belly muscles in for longer as you get stronger. 8. Practice doing this exercise while you are standing, such as when you are standing in line, or sitting. Pelvic tilt 1. Lie on your back with your knees bent. 2. \"Brace\" your stomachtighten your muscles by pulling in and imagining your belly button moving toward your spine. 3. Press your lower back into the floor. You should feel your hips and pelvis rock back. 4. Hold for 6 seconds while breathing smoothly. 5. Relax and allow your pelvis and hips to rock forward. 6. Repeat 8 to 12 times. Curl-up 1. Lie down with your knees bent and your arms at your sides. Keep your feet flat on the floor. 2. Lift your head and shoulders up a few inches. At the same time, raise your arms to about thigh level. 3. Hold for 6 seconds. 4. Relax and return to your starting position. 5. Repeat 8 to 12 times. Diagonal curl-up 1. Lie down with your knees bent and your arms at your sides. Keep your feet flat on the floor. 2. Lift your head and shoulders up. At the same time, reach to one side with both arms. 3. Hold for 6 seconds. 4. Relax and return to your starting position. 5. Repeat 8 to 12 times. 6. Repeat the same steps on your other side. Follow-up care is a key part of your treatment and safety. Be sure to make and go to all appointments, and call your doctor if you are having problems. It's also a good idea to know your test results and keep a list of the medicines you take. Where can you learn more? Go to http://www.gray.com/ Enter R729 in the search box to learn more about \"Abdominal Strain: Rehab Exercises. \" Current as of: March 2, 2020               Content Version: 12.6 © 2006-2020 [employer]. Care instructions adapted under license by [employer] (which disclaims liability or warranty for this information). If you have questions about a medical condition or this instruction, always ask your healthcare professional. [address] any warranty or liability for your use of this information.

## 2020-10-05 NOTE — PROGRESS NOTES
Johnathon Arteaga is a 76 y.o. female (: 1955) presenting to address:    Chief Complaint   Patient presents with    Annual Wellness Visit            Vitals:    10/05/20 1304   BP: 137/85   Pulse: 61   Resp: 16   Temp: 97.5 °F (36.4 °C)   TempSrc: Temporal   SpO2: 100%   Weight: 125 lb 12.8 oz (57.1 kg)   Height: 5' 4.5\" (1.638 m)   PainSc:   3   PainLoc: Hand       Hearing/Vision:      Hearing Screening    125Hz 250Hz 500Hz 1000Hz 2000Hz 3000Hz 4000Hz 6000Hz 8000Hz   Right ear:   40 40 40  40     Left ear:   40 40 40  40        Visual Acuity Screening    Right eye Left eye Both eyes   Without correction:      With correction: 20/20 20/20 20/20       Learning Assessment:     Learning Assessment 2014   PRIMARY LEARNER Patient   HIGHEST LEVEL OF EDUCATION - PRIMARY LEARNER  4 YEARS OF COLLEGE   BARRIERS PRIMARY LEARNER NONE   PRIMARY LANGUAGE ENGLISH   LEARNER PREFERENCE PRIMARY READING     LISTENING   ANSWERED BY self   RELATIONSHIP SELF     Depression Screening:     3 most recent PHQ Screens 10/5/2020   Little interest or pleasure in doing things Not at all   Feeling down, depressed, irritable, or hopeless Not at all   Total Score PHQ 2 0     Fall Risk Assessment:     Fall Risk Assessment, last 12 mths 10/5/2020   Able to walk? Yes   Fall in past 12 months? Yes   Fall with injury? Yes   Number of falls in past 12 months 1   Fall Risk Score 2     Abuse Screening:     Abuse Screening Questionnaire 10/5/2020   Do you ever feel afraid of your partner? N   Are you in a relationship with someone who physically or mentally threatens you? N   Is it safe for you to go home? Y     Coordination of Care Questionaire:   1. Have you been to the ER, urgent care clinic since your last visit? Hospitalized since your last visit? NO    2. Have you seen or consulted any other health care providers outside of the [address] [place] [address] since your last visit? Include any pap smears or colon screening.  NO    Advanced Directive:   1. Do you have an Advanced Directive? NO    2. Would you like information on Advanced Directives?  YES

## 2020-10-05 NOTE — PROGRESS NOTES
This is an Initial Medicare Annual Wellness Exam (AWV) (Performed 12 months after IPPE or effective date of Medicare Part B enrollment, Once in a lifetime)    I have reviewed the patient's medical history in detail and updated the computerized patient record. 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    Degenerative arthritis of finger M19.049     Past Medical History:   Diagnosis Date    Environmental allergies     outside and inside; [address] Allergy    Glaucoma     Hyperlipidemia     Menopause     Mitral valve prolapse 1980s    s/p cardiac workup; intermittent in nature    Palpitations     PVC (premature ventricular contraction)     S/P cardiac cath 05/16    Mid LAD 40-50% with myocardial bridging    Syncope     last time in 1990's      Past Surgical History:   Procedure Laterality Date    HX HEMORRHOIDECTOMY  1980s    HX TUBAL LIGATION  1985     Current Outpatient Medications   Medication Sig Dispense Refill    metoprolol succinate (Toprol XL) 25 mg XL tablet Take 1 Tab by mouth daily. 90 Tab 3    fluticasone propionate (FLONASE ALLERGY RELIEF) 50 mcg/actuation nasal spray 2 Sprays by Both Nostrils route daily.  multivitamin with iron (DAILY MULTI-VITAMINS/IRON) tablet Take 1 Tab by mouth daily.  calcium-cholecalciferol, d3, (CALCIUM 600 + D) 600-125 mg-unit Tab Take  by mouth.       simvastatin (ZOCOR) 5 mg tablet TAKE 1 TABLET BY MOUTH EVERY DAY AT NIGHT 90 Tab 1     Allergies   Allergen Reactions    Ciprofloxacin Diarrhea    Doxycycline Swelling  Prednisone Diarrhea       Family History   Problem Relation Age of Onset   [facility] Glaucoma Mother     Hypertension Mother     Glaucoma Father     Cancer Father         Colon    Hypertension Father     Asthma Brother     Hypertension Brother      Social History     Tobacco Use    Smoking status: Never Smoker    Smokeless tobacco: Never Used   Substance Use Topics    Alcohol use: No       Depression Risk Factor Screening:     3 most recent PHQ Screens 10/5/2020   Little interest or pleasure in doing things Not at all   Feeling down, depressed, irritable, or hopeless Not at all   Total Score PHQ 2 0       Alcohol Risk Screen   Do you average more than 1 drink per night or more than 7 drinks a week:  No     On any one occasion in the past three months have you have had more than 3 drinks containing alcohol:  No       Functional Ability and Level of Safety:   Hearing: Hearing is good. Activities of Daily Living: The home contains: no safety equipment. Patient does total self care    Ambulation: with no difficulty      Fall Risk:  Fall Risk Assessment, last 12 mths 10/5/2020   Able to walk? Yes   Fall in past 12 months? Yes   Fall with injury? Yes   Number of falls in past 12 months 1   Fall Risk Score 2     Abuse Screen:  Patient is not abused       Cognitive Screening   Has your family/caregiver stated any concerns about your memory: no     Cognitive Screening: Normal - .     Patient Care Team   Patient Care Team:  Donna Brush MD as PCP - General (Family Medicine)  Donna Brush MD as PCP [facility]  Chandler Cruz MD as Consulting Provider (Gastroenterology)  Papa Queen MD as Consulting Provider (Ophthalmology)  Pretty Shine MD as Consulting Provider (Otolaryngology)    Assessment/Plan   Education and counseling provided:  Are appropriate based on today's review and evaluation  End-of-Life planning (with patient's consent)  Pneumococcal Vaccine  Influenza Vaccine  Screening Mammography  Screening Pap and pelvic (covered once every 2 years)  Colorectal cancer screening tests  Cardiovascular screening blood test  Bone mass measurement (DEXA)  Screening for glaucoma  Diabetes screening test    Diagnoses and all orders for this visit:    1. Initial Medicare annual wellness visit    2. Screening for alcoholism    3. Encounter for screening mammogram for malignant neoplasm of breast  -     [facility] 3D ARSLAN W MAMMO BI SCREENING INCL CAD;  Future        Health Maintenance Due   Topic Date Due    DTaP/Tdap/Td series (1 - Tdap) 09/18/1975    Shingrix Vaccine Age 50> (1 of 2) 09/18/2004    Bone Densitometry (Dexa) Screening  09/18/2019    A1C test (Diabetic or Prediabetic)  09/11/2020

## 2020-10-05 NOTE — PROGRESS NOTES
[address]   Primary Care Office Visit - Problem-Oriented (Separate from the [facility] Wellness Visit)    : 1955   Afshan Vegas is a 76 y.o. female    Assessment/Plan:         ICD-10-CM ICD-9-CM   4. Osteopenia, unspecified location  M85.80 733.90   5. Postmenopausal state  Z78.0 V49.81   6. Coronary artery disease involving native coronary artery of native heart with angina pectoris ([facility] [address]  I25.119 414.01     413.9   7. Prediabetes  R73.03 790.29   8. Aspirin intolerance  Z78.9 995.27     E935.3   9. Skin lesion of chest wall  L98.9 709.9   10. Routine adult health maintenance  Z00.00 V70.0   11. Statin intolerance  Z78.9 995.27       # osteopenia  Unclear control  > check repeat DEXA      # CAD  Intolerant to statin (has tried even extremely low-dose every other day) and ASA  > check labs  Key CAD CHF Meds             metoprolol succinate (Toprol XL) 25 mg XL tablet (Taking) Take 1 Tab by mouth daily.           # prediabetes  Unclear control  > check labs      # skin lesion, left breast  & subQ nodules on R forearm  Initial encounter  > referral to derm          Orders Placed This Encounter    DEXA BONE DENSITY STUDY AXIAL     Standing Status:   Future     Standing Expiration Date:   2021     Scheduling Instructions:      [address]    HEMOGLOBIN A1C WITH EAG     Standing Status:   Future     Standing Expiration Date:   10/5/2021    CBC WITH AUTOMATED DIFF     Standing Status:   Future     Standing Expiration Date:   62/3/9481    METABOLIC PANEL, COMPREHENSIVE     Standing Status:   Future     Standing Expiration Date:   10/5/2021    T4, FREE     Standing Status:   Future     Standing Expiration Date:   10/5/2021    LIPID PANEL     Standing Status:   Future     Standing Expiration Date:   10/5/2021    TSH 3RD GENERATION     Standing Status:   Future     Standing Expiration Date:   10/5/2021    VITAMIN D, 25 HYDROXY     Standing Status:   Future     Standing Expiration Date: 10/5/2021    REFERRAL TO DERMATOLOGY     Referral Priority:   Routine     Referral Type:   Consultation     Referral Reason:   Specialty Services Required     Referred to Provider:   Leonetta Bosworth, MD     Number of Visits Requested:   1         Mi Garcia MD  Internal Medicine, Family Medicine & Sports Medicine  10/5/2020    History: Yonathan Phillips is a 76 y.o. female presenting to address:  Chief Complaint   Patient presents with   [facility] Wellness Visit            Last visit: Dec 2019    Cervical radiculopathy:  Must improved after injection  Only using APAP 500mg PRN    Recently bruised side of rib cage after mechanical fall out of bed, landing on a stepstool. Has improved in pain, however has not resumed any strengthening / core exercises since. Walks 4mi/day. Notes a right foream lump increasing in size. Also has a skin lesion under left breast that she notes may be getting bigger. Compliant with meds, denies any AE. [ see AWV documentation for pertinent PMHx, PSHx, FHx, allergies & meds]     Problem List:     [ see AWV documentation for active problem list ]     Review of Systems:     (only positive ROS in this note, all negatives included in  [address] documentation)    Review of Systems   Skin: Negative for rash. + skin lesion         Physical Assessment:   VS:  [ see AWV documentation for Vital Signs ]    Physical Exam  Nursing note reviewed. Constitutional:       Appearance: Normal appearance. She is well-developed. She is not diaphoretic. HENT:      Head: Normocephalic and atraumatic. Right Ear: Tympanic membrane, ear canal and external ear normal.      Left Ear: Tympanic membrane, ear canal and external ear normal.      Ears:      Comments: Mask in place  Neck:      Musculoskeletal: Neck supple. Thyroid: No thyromegaly. Cardiovascular:      Rate and Rhythm: Normal rate and regular rhythm. Heart sounds: No murmur.    Pulmonary:      Effort: Pulmonary effort is normal.      Breath sounds: Normal breath sounds. No wheezing or rales. Musculoskeletal: Normal range of motion. Arms:    Skin:     General: Skin is warm and dry. Neurological:      Mental Status: She is alert and oriented to person, place, and time. Cranial Nerves: No cranial nerve deficit. Coordination: Coordination normal.   Psychiatric:         Behavior: Behavior normal.         Thought Content:  Thought content normal.         Judgment: Judgment normal.

## 2020-10-15 ENCOUNTER — APPOINTMENT (OUTPATIENT)
Dept: FAMILY MEDICINE CLINIC | Age: 65
End: 2020-10-15

## 2020-10-16 LAB
25(OH)D3+25(OH)D2 SERPL-MCNC: 51.3 NG/ML (ref 30–100)
ALBUMIN SERPL-MCNC: 4.3 G/DL (ref 3.8–4.8)
ALBUMIN/GLOB SERPL: 1.7 {RATIO} (ref 1.2–2.2)
ALP SERPL-CCNC: 65 IU/L (ref 39–117)
ALT SERPL-CCNC: 16 IU/L (ref 0–32)
AST SERPL-CCNC: 20 IU/L (ref 0–40)
BASOPHILS # BLD AUTO: 0 X10E3/UL (ref 0–0.2)
BASOPHILS NFR BLD AUTO: 1 %
BILIRUB SERPL-MCNC: 0.5 MG/DL (ref 0–1.2)
BUN SERPL-MCNC: 13 MG/DL (ref 8–27)
BUN/CREAT SERPL: 19 (ref 12–28)
CALCIUM SERPL-MCNC: 9.4 MG/DL (ref 8.7–10.3)
CHLORIDE SERPL-SCNC: 107 MMOL/L (ref 96–106)
CHOLEST SERPL-MCNC: 212 MG/DL (ref 100–199)
CO2 SERPL-SCNC: 25 MMOL/L (ref 20–29)
CREAT SERPL-MCNC: 0.69 MG/DL (ref 0.57–1)
EOSINOPHIL # BLD AUTO: 0.1 X10E3/UL (ref 0–0.4)
EOSINOPHIL NFR BLD AUTO: 5 %
ERYTHROCYTE [DISTWIDTH] IN BLOOD BY AUTOMATED COUNT: 12.8 % (ref 11.7–15.4)
EST. AVERAGE GLUCOSE BLD GHB EST-MCNC: 111 MG/DL
GLOBULIN SER CALC-MCNC: 2.5 G/DL (ref 1.5–4.5)
GLUCOSE SERPL-MCNC: 87 MG/DL (ref 65–99)
HBA1C MFR BLD: 5.5 % (ref 4.8–5.6)
HCT VFR BLD AUTO: 38.9 % (ref 34–46.6)
HDLC SERPL-MCNC: 73 MG/DL
HGB BLD-MCNC: 12.4 G/DL (ref 11.1–15.9)
IMM GRANULOCYTES # BLD AUTO: 0 X10E3/UL (ref 0–0.1)
IMM GRANULOCYTES NFR BLD AUTO: 0 %
INTERPRETATION, 910389: NORMAL
LDLC SERPL CALC-MCNC: 125 MG/DL (ref 0–99)
LYMPHOCYTES # BLD AUTO: 0.9 X10E3/UL (ref 0.7–3.1)
LYMPHOCYTES NFR BLD AUTO: 37 %
MCH RBC QN AUTO: 29.3 PG (ref 26.6–33)
MCHC RBC AUTO-ENTMCNC: 31.9 G/DL (ref 31.5–35.7)
MCV RBC AUTO: 92 FL (ref 79–97)
MONOCYTES # BLD AUTO: 0.2 X10E3/UL (ref 0.1–0.9)
MONOCYTES NFR BLD AUTO: 8 %
NEUTROPHILS # BLD AUTO: 1.2 X10E3/UL (ref 1.4–7)
NEUTROPHILS NFR BLD AUTO: 49 %
PLATELET # BLD AUTO: 152 X10E3/UL (ref 150–450)
POTASSIUM SERPL-SCNC: 4.1 MMOL/L (ref 3.5–5.2)
PROT SERPL-MCNC: 6.8 G/DL (ref 6–8.5)
RBC # BLD AUTO: 4.23 X10E6/UL (ref 3.77–5.28)
SODIUM SERPL-SCNC: 144 MMOL/L (ref 134–144)
T4 FREE SERPL-MCNC: 1.32 NG/DL (ref 0.82–1.77)
TRIGL SERPL-MCNC: 77 MG/DL (ref 0–149)
TSH SERPL DL<=0.005 MIU/L-ACNC: 3.63 UIU/ML (ref 0.45–4.5)
VLDLC SERPL CALC-MCNC: 14 MG/DL (ref 5–40)
WBC # BLD AUTO: 2.5 X10E3/UL (ref 3.4–10.8)

## 2020-11-04 ENCOUNTER — HOSPITAL ENCOUNTER (OUTPATIENT)
Dept: MAMMOGRAPHY | Age: 65
Discharge: HOME | End: 2020-11-04
Attending: FAMILY MEDICINE
Payer: MEDICARE

## 2020-11-04 ENCOUNTER — HOSPITAL ENCOUNTER (OUTPATIENT)
Dept: GENERAL RADIOLOGY | Age: 65
Discharge: HOME | End: 2020-11-04
Attending: FAMILY MEDICINE
Payer: MEDICARE

## 2020-11-04 DIAGNOSIS — Z78.0 POSTMENOPAUSAL STATE: ICD-10-CM

## 2020-11-04 DIAGNOSIS — Z12.31 ENCOUNTER FOR SCREENING MAMMOGRAM FOR MALIGNANT NEOPLASM OF BREAST: ICD-10-CM

## 2020-11-04 DIAGNOSIS — M85.80 OSTEOPENIA, UNSPECIFIED LOCATION: Chronic | ICD-10-CM

## 2020-11-04 PROCEDURE — 77063 BREAST TOMOSYNTHESIS BI: CPT

## 2020-11-04 PROCEDURE — 77080 DXA BONE DENSITY AXIAL: CPT

## 2020-12-18 ENCOUNTER — TELEPHONE (OUTPATIENT)
Dept: FAMILY MEDICINE CLINIC | Age: 65
End: 2020-12-18

## 2020-12-18 NOTE — TELEPHONE ENCOUNTER
Pt called saying she gets frequent sinus infections and is wondering if you can send her an Rx for a Z-John. She currently has sinus issues.  Please advise

## 2020-12-18 NOTE — TELEPHONE ENCOUNTER
Informed patient that appointment would be needed and informed her to go on [employer] yadira and click E-visit.

## 2021-04-08 RX ORDER — METOPROLOL SUCCINATE 25 MG/1
TABLET, EXTENDED RELEASE ORAL
Qty: 90 TAB | Refills: 3 | Status: SHIPPED | OUTPATIENT
Start: 2021-04-08 | End: 2021-10-19

## 2021-10-19 ENCOUNTER — OFFICE VISIT (OUTPATIENT)
Dept: FAMILY MEDICINE CLINIC | Age: 66
End: 2021-10-19
Payer: MEDICARE

## 2021-10-19 VITALS
BODY MASS INDEX: 20.83 KG/M2 | DIASTOLIC BLOOD PRESSURE: 72 MMHG | RESPIRATION RATE: 16 BRPM | SYSTOLIC BLOOD PRESSURE: 118 MMHG | HEART RATE: 69 BPM | TEMPERATURE: 97 F | WEIGHT: 125 LBS | HEIGHT: 65 IN | OXYGEN SATURATION: 99 %

## 2021-10-19 DIAGNOSIS — Z13.1 SCREENING FOR DIABETES MELLITUS (DM): ICD-10-CM

## 2021-10-19 DIAGNOSIS — Z23 ENCOUNTER FOR IMMUNIZATION: ICD-10-CM

## 2021-10-19 DIAGNOSIS — R73.03 PREDIABETES: ICD-10-CM

## 2021-10-19 DIAGNOSIS — I25.119 CORONARY ARTERY DISEASE INVOLVING NATIVE CORONARY ARTERY OF NATIVE HEART WITH ANGINA PECTORIS (HCC): ICD-10-CM

## 2021-10-19 DIAGNOSIS — E55.9 VITAMIN D DEFICIENCY: ICD-10-CM

## 2021-10-19 DIAGNOSIS — Z78.0 POSTMENOPAUSAL STATE: ICD-10-CM

## 2021-10-19 DIAGNOSIS — Z00.00 MEDICARE ANNUAL WELLNESS VISIT, INITIAL: Primary | ICD-10-CM

## 2021-10-19 DIAGNOSIS — M81.0 SENILE OSTEOPOROSIS: ICD-10-CM

## 2021-10-19 DIAGNOSIS — Z23 NEEDS FLU SHOT: ICD-10-CM

## 2021-10-19 PROCEDURE — G8432 DEP SCR NOT DOC, RNG: HCPCS | Performed by: LEGAL MEDICINE

## 2021-10-19 PROCEDURE — G8536 NO DOC ELDER MAL SCRN: HCPCS | Performed by: LEGAL MEDICINE

## 2021-10-19 PROCEDURE — 90694 VACC AIIV4 NO PRSRV 0.5ML IM: CPT | Performed by: LEGAL MEDICINE

## 2021-10-19 PROCEDURE — G9899 SCRN MAM PERF RSLTS DOC: HCPCS | Performed by: LEGAL MEDICINE

## 2021-10-19 PROCEDURE — G0438 PPPS, INITIAL VISIT: HCPCS | Performed by: LEGAL MEDICINE

## 2021-10-19 PROCEDURE — G8427 DOCREV CUR MEDS BY ELIG CLIN: HCPCS | Performed by: LEGAL MEDICINE

## 2021-10-19 PROCEDURE — 90471 IMMUNIZATION ADMIN: CPT | Performed by: LEGAL MEDICINE

## 2021-10-19 PROCEDURE — G0008 ADMIN INFLUENZA VIRUS VAC: HCPCS | Performed by: LEGAL MEDICINE

## 2021-10-19 PROCEDURE — 90715 TDAP VACCINE 7 YRS/> IM: CPT | Performed by: LEGAL MEDICINE

## 2021-10-19 PROCEDURE — 1101F PT FALLS ASSESS-DOCD LE1/YR: CPT | Performed by: LEGAL MEDICINE

## 2021-10-19 PROCEDURE — 3017F COLORECTAL CA SCREEN DOC REV: CPT | Performed by: LEGAL MEDICINE

## 2021-10-19 PROCEDURE — G8420 CALC BMI NORM PARAMETERS: HCPCS | Performed by: LEGAL MEDICINE

## 2021-10-19 NOTE — PROGRESS NOTES
Erin Li is a 78 y.o. female (: 1955) presenting to address:    Chief Complaint   Patient presents with   [facility]    Immunization/Injection     flu vaccine       Vitals:    10/19/21 0834   BP: 118/72   Pulse: 69   Resp: 16   Temp: 97 °F (36.1 °C)   TempSrc: Temporal   SpO2: 99%   Weight: 125 lb (56.7 kg)   Height: 5' 4.5\" (1.638 m)   PainSc:   2   PainLoc: Back       Hearing/Vision:      Hearing Screening    125Hz 250Hz 500Hz 1000Hz 2000Hz 3000Hz 4000Hz 6000Hz 8000Hz   Right ear:            Left ear:               Visual Acuity Screening    Right eye Left eye Both eyes   Without correction: 20/20 20/20 20/20   With correction:          Learning Assessment:     Learning Assessment 2014   PRIMARY LEARNER Patient   HIGHEST LEVEL OF EDUCATION - PRIMARY LEARNER  4 YEARS OF COLLEGE   BARRIERS PRIMARY LEARNER NONE   PRIMARY LANGUAGE ENGLISH   LEARNER PREFERENCE PRIMARY READING     LISTENING   ANSWERED BY self   RELATIONSHIP SELF     Depression Screening:     3 most recent PHQ Screens 10/19/2021   Little interest or pleasure in doing things Not at all   Feeling down, depressed, irritable, or hopeless Not at all   Total Score PHQ 2 0     Fall Risk Assessment:     Fall Risk Assessment, last 12 mths 10/19/2021   Able to walk? Yes   Fall in past 12 months? 0   Number of falls in past 12 months -   Fall with injury? -     Abuse Screening:     Abuse Screening Questionnaire 10/19/2021   Do you ever feel afraid of your partner? N   Are you in a relationship with someone who physically or mentally threatens you? N   Is it safe for you to go home? Y     Coordination of Care Questionaire:   1. Have you been to the ER, urgent care clinic since your last visit? Hospitalized since your last visit? YES, Urgent Care    2. Have you seen or consulted any other health care providers outside of the [address] [place] [address] since your last visit? Include any pap smears or colon screening.  YES    Advanced Directive:   1. Do you have an Advanced Directive? NO    2. Would you like information on Advanced Directives?  NO

## 2021-10-19 NOTE — PROGRESS NOTES
Immunization/s of the high dose flu vaccine and TDaP vaccine were administered 10/19/2021 by Narcisa Malik LPN. Patient tolerated procedure well. No reactions noted.

## 2021-10-19 NOTE — PROGRESS NOTES
(AWV) The Initial Medicare Annual Wellness Exam PROGRESS NOTE    This is an Initial Medicare Annual Wellness Exam (AWV) (Performed 12 months after IPPE or effective date of Medicare Part B enrollment, Once in a lifetime)    I have reviewed the patient's medical history in detail and updated the computerized patient record. Andres Ybarra is a 78 y.o.   female and presents for an annual wellness exam and also to establish care with a new provider  She has been doing well    She stopped taking metoprolol few months ago  She feels okay and does not have any palpitation no chest pain and blood pressure is normal patient has stopped medication about few months ago      Patient Active Problem List   Diagnosis Code    Hyperplastic colon polyp K63.5    Glaucoma H40.9    Other osteoporosis without current pathological fracture M81.8    Routine health maintenance Z00.00    Cyst of joint of hand M25.849    Recurrent sinusitis J32.9    Postmenopausal state Z78.0    Coronary artery disease involving native coronary artery of native heart with angina pectoris ([facility] I25.119    Paroxysmal nocturnal dyspnea R06.00    Prediabetes R73.03    Aspirin intolerance Z78.9    Degenerative arthritis of finger M19.049     Patient Active Problem List    Diagnosis Date Noted    Degenerative arthritis of finger 06/24/2017    Paroxysmal nocturnal dyspnea 06/20/2017    Prediabetes 06/20/2017    Aspirin intolerance 06/20/2017    Coronary artery disease involving native coronary artery of native heart with angina pectoris ([facility] [address] 05/03/2016    Postmenopausal state 11/10/2015    Cyst of joint of hand 01/28/2015    Recurrent sinusitis 01/28/2015    Other osteoporosis without current pathological fracture 04/24/2014    Routine health maintenance 04/24/2014    Hyperplastic colon polyp 04/02/2013    Glaucoma 04/02/2013     Current Outpatient Medications   Medication Sig Dispense Refill    fluticasone propionate (FLONASE ALLERGY RELIEF) 50 mcg/actuation nasal spray 2 Sprays by Both Nostrils route daily.  multivitamin with iron (DAILY MULTI-VITAMINS/IRON) tablet Take 1 Tab by mouth daily.  calcium-cholecalciferol, d3, (CALCIUM 600 + D) 600-125 mg-unit Tab Take  by mouth. 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    Hyperlipidemia     Menopause     Mitral valve prolapse 1980s    s/p cardiac workup; intermittent in nature    Palpitations     PVC (premature ventricular contraction)     S/P cardiac cath 05/16    Mid LAD 40-50% with myocardial bridging    Syncope     last time in 1990's     Past Surgical History:   Procedure Laterality Date    HX HEMORRHOIDECTOMY  1980s    HX TUBAL LIGATION  1985     Family History   Problem Relation Age of Onset    Glaucoma Mother     Hypertension Mother     Glaucoma Father     Cancer Father         Colon    Hypertension Father     Asthma Brother     Hypertension Brother      Social History     Tobacco Use    Smoking status: Never Smoker    Smokeless tobacco: Never Used   Substance Use Topics    Alcohol use: No       ROS   History obtained from the patient  General ROS: negative for - chills, fatigue or fever  Psychological ROS: negative for - anxiety or depression  Ophthalmic ROS: negative for - blurry vision, decreased vision or double vision  ENT ROS: negative for - epistaxis or headaches  Allergy and Immunology ROS: positive for - nasal congestion and postnasal drip  Hematological and Lymphatic ROS: negative for - bleeding problems, blood clots or blood transfusions  Endocrine ROS: negative for - breast changes  Breast ROS: negative for breast lumps  Respiratory ROS: no cough, shortness of breath, or wheezing  Cardiovascular ROS: no chest pain or dyspnea on exertion  negative   Gastrointestinal ROS: no abdominal pain, change in bowel habits, or black or bloody stools  Genito-Urinary ROS: no dysuria, trouble voiding, or hematuria  Musculoskeletal ROS: positive for - joint pain  negative for - gait disturbance or joint swelling  Neurological ROS: no TIA or stroke symptoms  Dermatological ROS: negative for - mole changes or skin lesion changes    All other systems reviewed and are negative. 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  multivitamin with iron (DAILY MULTI-VITAMINS/IRON) tablet Take 1 Tab by mouth daily.  calcium-cholecalciferol, d3, (CALCIUM 600 + D) 600-125 mg-unit Tab Take  by mouth.        Allergies   Allergen Reactions    Ciprofloxacin Diarrhea    Statins-Hmg-Coa Reductase Inhibitors Myalgia     Myalgia & swelling; failed several different statins     Family History   Problem Relation Age of Onset    Glaucoma Mother     Hypertension Mother     Glaucoma Father     Cancer Father         Colon    Hypertension Father     Asthma Brother     Hypertension Brother      Social History     Tobacco Use    Smoking status: Never Smoker    Smokeless tobacco: Never Used   Substance Use Topics    Alcohol use: No     Patient Active Problem List   Diagnosis Code    Hyperplastic colon polyp K63.5    Glaucoma H40.9    Other osteoporosis without current pathological fracture M81.8    Routine health maintenance Z00.00    Cyst of joint of hand M25.849    Recurrent sinusitis J32.9    Postmenopausal state Z78.0    Coronary artery disease involving native coronary artery of native heart with angina pectoris (HCC) I25.119    Paroxysmal nocturnal dyspnea R06.00    Prediabetes R73.03    Aspirin intolerance Z78.9    Degenerative arthritis of finger M19.049       Health Maintenance History  Immunizations reviewed she is up-to-date she received flu vaccine but did not start today    colonoscopy: Up-to-date  Chest CT: Never smoker  Eye exam: Jose Maker she is due next year she get it done every 2 years  Dexascan she is due next year     Depression Risk Factor Screening:      Patient Health Questionnaire (PHQ-2)   Over the last 2 weeks, how often have you been bothered by any of the following problems? · Little interest or pleasure in doing things? · Not at all. [0]  · Feeling down, depressed, or hopeless? · Not at all. [0]    Total Score: 0/6  PHQ-2 Assessment Scoring:   A score of 2 or more requires further screening with the PHQ-9    Alcohol Risk Factor Screening:     Women: On any occasion during the past 3 months, have you had more than 3 drinks containing alcohol? Do you average more than 7 drinks per week? Men: On any occasion during the past 3 months, have you had more than 4 drinks containing alcohol? Do you average more than 14 drinks per week? Functional Ability and Level of Safety:     Hearing Loss    Hearing is good. Activities of Daily Living   Self-care.    Requires assistance with: no ADLs    Fall Risk   No fall risk factors    Abuse Screen   Patient is not abused  None    Examination Physical Examination  Vitals:    10/19/21 0834   BP: 118/72   Pulse: 69   Resp: 16   Temp: 97 °F (36.1 °C)   TempSrc: Temporal   SpO2: 99%   Weight: 125 lb (56.7 kg)   Height: 5' 4.5\" (1.638 m)   PainSc:   2   PainLoc: Back      Body mass index is 21.12 kg/m². Evaluation of Cognitive Function:  Mood/affect:Great   Appearance:wel groomed   Family member/caregiver input: she is not accompanied today     alert, well appearing, and in no distress, oriented to person, place, and time, normal appearing weight and well hydrated    Patient Care Team:  Fabiana Dickey MD as PCP - General (Family Medicine)  Marielle Ribera MD as Consulting Provider (Gastroenterology)  Rossy Nunes MD as Consulting Provider (Ophthalmology)  Jyothi Hancock MD as Consulting Provider (Otolaryngology)    End-of-life planning  Advanced Directive in the case than an injury or illness causes the patient to be unable to make health care decisions    Health Care Directive or Living Will: no, paper work was given to the patient to complete and bring back     Advice/Referrals/Counselling/Plan:   Education and counseling provided:  Are appropriate based on today's review and evaluation  Pneumococcal Vaccine  Influenza Vaccine  Screening Mammography  Colorectal cancer screening tests  Bone mass measurement (DEXA)  Include in education list (weight loss, physical activity, smoking cessation, fall prevention, and nutrition)    ICD-10-CM ICD-9-CM    1. Medicare annual wellness visit, initial  Z00.00 V70.0 CBC WITH AUTOMATED DIFF      METABOLIC PANEL, COMPREHENSIVE      LIPID PANEL   2. Needs flu shot  Z23 V04.81 FLU (FLUAD QUAD INFLUENZA VACCINE,QUAD,ADJUVANTED)   3. Prediabetes  R73.03 790.29 TSH 3RD GENERATION   4. Screening for diabetes mellitus (DM)  Z13.1 V77.1 HEMOGLOBIN A1C W/O EAG   5. Coronary artery disease involving native coronary artery of native heart with angina pectoris ([facility]  I25.119 414.01      413.9    6.  Vitamin D deficiency  E55.9 268.9 VITAMIN D, 25 HYDROXY   7. Encounter for immunization  Z23 V03.89 TETANUS, DIPHTHERIA TOXOIDS AND ACELLULAR PERTUSSIS VACCINE (TDAP), IN INDIVIDS. >=7, IM     Encounter Diagnoses   Name Primary?     Medicare annual wellness visit, initial Yes    Needs flu shot     Prediabetes     Screening for diabetes mellitus (DM)     Coronary artery disease involving native coronary artery of native heart with angina pectoris (HCC)     Vitamin D deficiency     Encounter for immunization      Orders Placed This Encounter    Influenza Vaccine, QUAD, 65 Yrs +  IM  (Fluad 52355 )    Tetanus, diphtheria toxoids and acellular pertussis (TDAP) vaccine, in individuals >=7 years, IM    CBC WITH AUTOMATED DIFF    METABOLIC PANEL, COMPREHENSIVE    LIPID PANEL    TSH 3RD GENERATION    HEMOGLOBIN A1C W/O EAG    VITAMIN D, 25 HYDROXY     Orders Placed This Encounter    Influenza Vaccine, QUAD, 65 Yrs +  IM  (Fluad 46198 )    Tetanus, diphtheria toxoids and acellular pertussis (TDAP) vaccine, in individuals >=7 years, IM    CBC WITH AUTOMATED DIFF     Standing Status:   Future     Standing Expiration Date:   04/62/9565    METABOLIC PANEL, COMPREHENSIVE     Standing Status:   Future     Standing Expiration Date:   10/20/2022    LIPID PANEL     Standing Status:   Future     Standing Expiration Date:   10/20/2022    TSH 3RD GENERATION     Standing Status:   Future     Standing Expiration Date:   10/20/2022    HEMOGLOBIN A1C W/O EAG     Standing Status:   Future     Standing Expiration Date:   10/20/2022    VITAMIN D, 25 HYDROXY     Standing Status:   Future     Standing Expiration Date:   10/20/2022     Orders Placed This Encounter    Influenza Vaccine, QUAD, 65 Yrs +  IM  (Fluad 91171 )    Tetanus, diphtheria toxoids and acellular pertussis (TDAP) vaccine, in individuals >=7 years, IM    CBC WITH AUTOMATED DIFF    METABOLIC PANEL, COMPREHENSIVE    LIPID PANEL    TSH 3RD GENERATION    HEMOGLOBIN A1C W/O EAG    VITAMIN D, 25 HYDROXY     Diagnoses and all orders for this visit:    1. Medicare annual wellness visit, initial  -     CBC WITH AUTOMATED DIFF; Future  -     METABOLIC PANEL, COMPREHENSIVE; Future  -     LIPID PANEL; Future    2. Needs flu shot  -     FLU (FLUAD QUAD INFLUENZA VACCINE,QUAD,ADJUVANTED)    3. Prediabetes  -     TSH 3RD GENERATION; Future    4. Screening for diabetes mellitus (DM)  -     HEMOGLOBIN A1C W/O EAG; Future    5. Coronary artery disease involving native coronary artery of native heart with angina pectoris (HCC)  Stable with no symptoms  6. Vitamin D deficiency  She take calcium and vitamin D supplement also advised about calcium rich diet  -     VITAMIN D, 25 HYDROXY; Future    7. Encounter for immunization  Vaccine was given with no complications  -     TETANUS, DIPHTHERIA TOXOIDS AND ACELLULAR PERTUSSIS VACCINE (TDAP), IN INDIVIDS. >=7, IM    Osteoporosis    she had osteoporosis advised about weightbearing exercises calcium rich diet and vitamin D supplements        Follow-up and Dispositions    · Return in about 1 year (around 10/19/2022). current treatment plan is effective, no change in therapy. Brief written plan, checklist    I have discussed the diagnosis with the patient and the intended plan as seen in the above orders. The patient has received an after-visit summary and questions were answered concerning future plans. I have discussed medication side effects and warnings with the patient as well. I have reviewed the plan of care with the patient, accepted their input and they are in agreement with the treatment goals. Follow-up and Dispositions    · Return in about 1 year (around 10/19/2022).            ____________________________________________________________    Problem Assessment    for treatment of   Chief Complaint   Patient presents with   [facility]    Immunization/Injection     flu vaccine         SUBJECTIVE

## 2021-10-27 ENCOUNTER — APPOINTMENT (OUTPATIENT)
Dept: FAMILY MEDICINE CLINIC | Age: 66
End: 2021-10-27

## 2021-10-27 DIAGNOSIS — Z00.00 MEDICARE ANNUAL WELLNESS VISIT, INITIAL: ICD-10-CM

## 2021-10-27 DIAGNOSIS — Z13.1 SCREENING FOR DIABETES MELLITUS (DM): ICD-10-CM

## 2021-10-27 DIAGNOSIS — E55.9 VITAMIN D DEFICIENCY: ICD-10-CM

## 2021-10-27 DIAGNOSIS — R73.03 PREDIABETES: ICD-10-CM

## 2021-10-28 LAB
25(OH)D3+25(OH)D2 SERPL-MCNC: 52.4 NG/ML (ref 30–100)
ALBUMIN SERPL-MCNC: 4.4 G/DL (ref 3.8–4.8)
ALBUMIN/GLOB SERPL: 1.8 {RATIO} (ref 1.2–2.2)
ALP SERPL-CCNC: 44 IU/L (ref 44–121)
ALT SERPL-CCNC: 15 IU/L (ref 0–32)
AST SERPL-CCNC: 20 IU/L (ref 0–40)
BASOPHILS # BLD AUTO: 0 X10E3/UL (ref 0–0.2)
BASOPHILS NFR BLD AUTO: 1 %
BILIRUB SERPL-MCNC: 0.5 MG/DL (ref 0–1.2)
BUN SERPL-MCNC: 14 MG/DL (ref 8–27)
BUN/CREAT SERPL: 21 (ref 12–28)
CALCIUM SERPL-MCNC: 9.4 MG/DL (ref 8.7–10.3)
CHLORIDE SERPL-SCNC: 104 MMOL/L (ref 96–106)
CHOLEST SERPL-MCNC: 222 MG/DL (ref 100–199)
CO2 SERPL-SCNC: 27 MMOL/L (ref 20–29)
CREAT SERPL-MCNC: 0.68 MG/DL (ref 0.57–1)
EOSINOPHIL # BLD AUTO: 0.1 X10E3/UL (ref 0–0.4)
EOSINOPHIL NFR BLD AUTO: 5 %
ERYTHROCYTE [DISTWIDTH] IN BLOOD BY AUTOMATED COUNT: 12.7 % (ref 11.7–15.4)
GLOBULIN SER CALC-MCNC: 2.4 G/DL (ref 1.5–4.5)
GLUCOSE SERPL-MCNC: 91 MG/DL (ref 65–99)
HBA1C MFR BLD: 5.6 % (ref 4.8–5.6)
HCT VFR BLD AUTO: 39 % (ref 34–46.6)
HDLC SERPL-MCNC: 81 MG/DL
HGB BLD-MCNC: 12.9 G/DL (ref 11.1–15.9)
IMM GRANULOCYTES # BLD AUTO: 0 X10E3/UL (ref 0–0.1)
IMM GRANULOCYTES NFR BLD AUTO: 0 %
IMP & REVIEW OF LAB RESULTS: NORMAL
LDLC SERPL CALC-MCNC: 130 MG/DL (ref 0–99)
LYMPHOCYTES # BLD AUTO: 1.1 X10E3/UL (ref 0.7–3.1)
LYMPHOCYTES NFR BLD AUTO: 45 %
MCH RBC QN AUTO: 29.9 PG (ref 26.6–33)
MCHC RBC AUTO-ENTMCNC: 33.1 G/DL (ref 31.5–35.7)
MCV RBC AUTO: 90 FL (ref 79–97)
MONOCYTES # BLD AUTO: 0.2 X10E3/UL (ref 0.1–0.9)
MONOCYTES NFR BLD AUTO: 7 %
NEUTROPHILS # BLD AUTO: 1 X10E3/UL (ref 1.4–7)
NEUTROPHILS NFR BLD AUTO: 42 %
PLATELET # BLD AUTO: 155 X10E3/UL (ref 150–450)
POTASSIUM SERPL-SCNC: 4.4 MMOL/L (ref 3.5–5.2)
PROT SERPL-MCNC: 6.8 G/DL (ref 6–8.5)
RBC # BLD AUTO: 4.32 X10E6/UL (ref 3.77–5.28)
SODIUM SERPL-SCNC: 142 MMOL/L (ref 134–144)
TRIGL SERPL-MCNC: 65 MG/DL (ref 0–149)
TSH SERPL DL<=0.005 MIU/L-ACNC: 3.76 UIU/ML (ref 0.45–4.5)
VLDLC SERPL CALC-MCNC: 11 MG/DL (ref 5–40)
WBC # BLD AUTO: 2.5 X10E3/UL (ref 3.4–10.8)

## 2021-10-31 NOTE — PROGRESS NOTES
Low WBC it is slightly improved from last time   Cholesterol is slightly elevated from last time to be adherent to lifestyle   Other labs are within normal limits

## 2022-03-19 PROBLEM — R73.03 PREDIABETES: Status: ACTIVE | Noted: 2017-06-20

## 2022-03-19 PROBLEM — Z78.9 ASPIRIN INTOLERANCE: Status: ACTIVE | Noted: 2017-06-20

## 2022-03-19 PROBLEM — M81.0 SENILE OSTEOPOROSIS: Status: ACTIVE | Noted: 2021-10-19

## 2022-03-19 PROBLEM — R06.00 PAROXYSMAL NOCTURNAL DYSPNEA: Status: ACTIVE | Noted: 2017-06-20

## 2022-03-20 PROBLEM — M19.049 DEGENERATIVE ARTHRITIS OF FINGER: Status: ACTIVE | Noted: 2017-06-24

## 2022-04-02 ASSESSMENT — VISUAL ACUITY
OD_SC: 20/20
OD_SC: 20/25
OS_SC: 20/20
OD_SC: 20/20
OU_CC: J1+
OD_CC: J1
OS_SC: 20/20
OS_SC: 20/20
OS_CC: J1

## 2022-04-02 ASSESSMENT — TONOMETRY
OS_IOP_MMHG: 21
OS_IOP_MMHG: 20
OD_IOP_MMHG: 20
OD_IOP_MMHG: 21

## 2022-04-29 ENCOUNTER — OFFICE VISIT (OUTPATIENT)
Dept: FAMILY MEDICINE CLINIC | Age: 67
End: 2022-04-29
Payer: MEDICARE

## 2022-04-29 VITALS
DIASTOLIC BLOOD PRESSURE: 82 MMHG | HEIGHT: 65 IN | WEIGHT: 123 LBS | SYSTOLIC BLOOD PRESSURE: 120 MMHG | HEART RATE: 67 BPM | OXYGEN SATURATION: 99 % | TEMPERATURE: 97.7 F | RESPIRATION RATE: 13 BRPM | BODY MASS INDEX: 20.49 KG/M2

## 2022-04-29 DIAGNOSIS — R73.03 PREDIABETES: Primary | ICD-10-CM

## 2022-04-29 DIAGNOSIS — R79.89 ELEVATED FERRITIN LEVEL: ICD-10-CM

## 2022-04-29 DIAGNOSIS — I25.119 CORONARY ARTERY DISEASE INVOLVING NATIVE CORONARY ARTERY OF NATIVE HEART WITH ANGINA PECTORIS (HCC): ICD-10-CM

## 2022-04-29 DIAGNOSIS — R79.89 ABNORMAL CBC: ICD-10-CM

## 2022-04-29 PROCEDURE — 99214 OFFICE O/P EST MOD 30 MIN: CPT | Performed by: LEGAL MEDICINE

## 2022-04-29 PROCEDURE — 1090F PRES/ABSN URINE INCON ASSESS: CPT | Performed by: LEGAL MEDICINE

## 2022-04-29 PROCEDURE — 1101F PT FALLS ASSESS-DOCD LE1/YR: CPT | Performed by: LEGAL MEDICINE

## 2022-04-29 PROCEDURE — G8420 CALC BMI NORM PARAMETERS: HCPCS | Performed by: LEGAL MEDICINE

## 2022-04-29 PROCEDURE — G9899 SCRN MAM PERF RSLTS DOC: HCPCS | Performed by: LEGAL MEDICINE

## 2022-04-29 PROCEDURE — G8510 SCR DEP NEG, NO PLAN REQD: HCPCS | Performed by: LEGAL MEDICINE

## 2022-04-29 PROCEDURE — 3017F COLORECTAL CA SCREEN DOC REV: CPT | Performed by: LEGAL MEDICINE

## 2022-04-29 PROCEDURE — G8427 DOCREV CUR MEDS BY ELIG CLIN: HCPCS | Performed by: LEGAL MEDICINE

## 2022-04-29 PROCEDURE — G8536 NO DOC ELDER MAL SCRN: HCPCS | Performed by: LEGAL MEDICINE

## 2022-04-29 RX ORDER — CHOLECALCIFEROL (VITAMIN D3) 125 MCG
CAPSULE ORAL
COMMUNITY

## 2022-04-29 RX ORDER — LORATADINE 5 MG/1
TABLET, ORALLY DISINTEGRATING ORAL
COMMUNITY

## 2022-04-29 NOTE — PROGRESS NOTES
Julio Mcdonough     Chief Complaint   Patient presents with    Follow-up    Abnormal Lab Results     Vitals:    04/29/22 1044   BP: 120/82   Pulse: 67   Resp: 13   Temp: 97.7 °F (36.5 °C)   TempSrc: Temporal   SpO2: 99%   Weight: 123 lb (55.8 kg)   Height: 5' 4.5\" (1.638 m)   PainSc:   2   PainLoc: Generalized         HPI: Patient is here for follow-up after seeing Israel EDGE who is with [facility]. They were concerned about concerned aboutabnormal lab work: TIBC 228 & Ferritin 183. 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    Hyperlipidemia     Menopause     Mitral valve prolapse 1980s    s/p cardiac workup; intermittent in nature    Palpitations     PVC (premature ventricular contraction)     S/P cardiac cath 05/16    Mid LAD 40-50% with myocardial bridging    Syncope     last time in 1990's     Past Surgical History:   Procedure Laterality Date    HX HEMORRHOIDECTOMY  1980s    HX TUBAL LIGATION  1985     Social History     Tobacco Use    Smoking status: Never Smoker    Smokeless tobacco: Never Used   Substance Use Topics    Alcohol use: No       Family History   Problem Relation Age of Onset    Glaucoma Mother     Hypertension Mother     Glaucoma Father     Cancer Father         Colon    Hypertension Father     Asthma Brother     Hypertension Brother        Review of Systems   Constitutional: Positive for malaise/fatigue. Negative for chills, fever and weight loss. HENT: Negative for congestion, ear discharge, ear pain, hearing loss and nosebleeds. Eyes: Negative for blurred vision, double vision and discharge. Respiratory: Negative for cough. Cardiovascular: Negative for chest pain, palpitations, claudication and leg swelling. Gastrointestinal: Negative for abdominal pain, constipation, diarrhea, nausea and vomiting. Genitourinary: Negative for dysuria, frequency and urgency. Musculoskeletal: Positive for joint pain and myalgias. Skin: Negative for itching and rash. Neurological: Negative for dizziness, tingling, sensory change, speech change, focal weakness, weakness and headaches. Physical Exam  Vitals and nursing note reviewed. Constitutional:       General: She is not in acute distress. Appearance: Normal appearance. She is well-developed. She is not diaphoretic. HENT:      Head: Normocephalic and atraumatic. Neck:      Thyroid: No thyromegaly. Cardiovascular:      Rate and Rhythm: Normal rate and regular rhythm. Pulses: Normal pulses. Heart sounds: Normal heart sounds. Pulmonary:      Effort: Pulmonary effort is normal. No respiratory distress. Breath sounds: Normal breath sounds. No wheezing or rales. Chest:      Chest wall: No tenderness. Abdominal:      General: There is no distension. Palpations: Abdomen is soft. Tenderness: There is no abdominal tenderness. There is no right CVA tenderness, left CVA tenderness, guarding or rebound. Musculoskeletal:         General: No tenderness or deformity. Normal range of motion. Right lower leg: No edema. Left lower leg: No edema. Lymphadenopathy:      Cervical: No cervical adenopathy. Skin:     General: Skin is warm and dry. Coloration: Skin is not jaundiced or pale. Findings: No bruising, erythema, lesion or rash.    Neurological:      Mental Status: She is alert and oriented to person, place, and time. Cranial Nerves: No cranial nerve deficit. Coordination: Coordination normal.   Psychiatric:         Behavior: Behavior normal.         Thought Content: Thought content normal.         Judgment: Judgment normal.          Assessment and plan     Plan of care has been discussed with the patient, he agrees to the plan and verbalized understanding. All his questions were answered  More than 50% of the time spent in this visit was counseling the patient about  illness and treatment options         1. Coronary artery disease involving native coronary artery of native heart with angina pectoris [facility])  Patient need evaluation for coronary disease  - REFERRAL TO CARDIOLOGY  - METABOLIC PANEL, COMPREHENSIVE; Future    2. Prediabetes  She is adherent to lifestyle modifications  - METABOLIC PANEL, COMPREHENSIVE; Future  - HEMOGLOBIN A1C W/O EAG; Future    3. Elevated ferritin level  Advised patient to stop multivitamin use iron  And to repeat ferritin level  4. Abnormal CBC    - CBC WITH AUTOMATED DIFF; Future  - FERRITIN; Future    Current Outpatient Medications   Medication Sig Dispense Refill    biotin 5,000 mcg TbDi       Loratadine (Claritin RediTabs) 5 mg TbDi       fluticasone propionate (FLONASE ALLERGY RELIEF) 50 mcg/actuation nasal spray 2 Sprays by Both Nostrils route daily.  multivitamin with iron (DAILY MULTI-VITAMINS/IRON) tablet Take 1 Tab by mouth daily.  calcium-cholecalciferol, d3, (CALCIUM 600 + D) 600-125 mg-unit Tab Take  by mouth.          Patient Active Problem List    Diagnosis Date Noted    Senile osteoporosis 10/19/2021    Degenerative arthritis of finger 06/24/2017    Paroxysmal nocturnal dyspnea 06/20/2017    Prediabetes 06/20/2017    Aspirin intolerance 06/20/2017    Coronary artery disease involving native coronary artery of native heart with angina pectoris ([facility] [address] 05/03/2016    Postmenopausal state 11/10/2015    Cyst of joint of hand 01/28/2015    Recurrent sinusitis 01/28/2015    Other osteoporosis without current pathological fracture 04/24/2014    Routine health maintenance 04/24/2014    Hyperplastic colon polyp 04/02/2013    Glaucoma 04/02/2013     Results for orders placed or performed in visit on 10/27/21   HEMOGLOBIN A1C W/O EAG   Result Value Ref Range    Hemoglobin A1c 5.6 4.8 - 5.6 %   VITAMIN D, 25 HYDROXY   Result Value Ref Range    VITAMIN D, 25-HYDROXY 52.4 30.0 - 100.0 ng/mL   TSH 3RD GENERATION   Result Value Ref Range    TSH 3.760 0.450 - 4.500 uIU/mL   LIPID PANEL   Result Value Ref Range    Cholesterol, total 222 (H) 100 - 199 mg/dL    Triglyceride 65 0 - 149 mg/dL    HDL Cholesterol 81 >39 mg/dL    VLDL, calculated 11 5 - 40 mg/dL    LDL, calculated 130 (H) 0 - 99 mg/dL   METABOLIC PANEL, COMPREHENSIVE   Result Value Ref Range    Glucose 91 65 - 99 mg/dL    BUN 14 8 - 27 mg/dL    Creatinine 0.68 0.57 - 1.00 mg/dL    GFR est non-AA 91 >59 mL/min/1.73    GFR est  >59 mL/min/1.73    BUN/Creatinine ratio 21 12 - 28    Sodium 142 134 - 144 mmol/L    Potassium 4.4 3.5 - 5.2 mmol/L    Chloride 104 96 - 106 mmol/L    CO2 27 20 - 29 mmol/L    Calcium 9.4 8.7 - 10.3 mg/dL    Protein, total 6.8 6.0 - 8.5 g/dL    Albumin 4.4 3.8 - 4.8 g/dL    GLOBULIN, TOTAL 2.4 1.5 - 4.5 g/dL    A-G Ratio 1.8 1.2 - 2.2    Bilirubin, total 0.5 0.0 - 1.2 mg/dL    Alk. phosphatase 44 44 - 121 IU/L    AST (SGOT) 20 0 - 40 IU/L    ALT (SGPT) 15 0 - 32 IU/L   CBC WITH AUTOMATED DIFF   Result Value Ref Range    WBC 2.5 (LL) 3.4 - 10.8 x10E3/uL    RBC 4.32 3.77 - 5.28 x10E6/uL    HGB 12.9 11.1 - 15.9 g/dL    HCT 39.0 34.0 - 46.6 %    MCV 90 79 - 97 fL    MCH 29.9 26.6 - 33.0 pg    MCHC 33.1 31.5 - 35.7 g/dL    RDW 12.7 11.7 - 15.4 %    PLATELET 838 178 - 814 x10E3/uL    NEUTROPHILS 42 Not Estab. %    Lymphocytes 45 Not Estab. %    MONOCYTES 7 Not Estab. %    EOSINOPHILS 5 Not Estab. %    BASOPHILS 1 Not Estab. %    ABS.  NEUTROPHILS 1.0 (L) 1.4 - 7.0 x10E3/uL    Abs Lymphocytes 1.1 0.7 - 3.1 x10E3/uL    ABS. MONOCYTES 0.2 0.1 - 0.9 x10E3/uL    ABS. EOSINOPHILS 0.1 0.0 - 0.4 x10E3/uL    ABS. BASOPHILS 0.0 0.0 - 0.2 x10E3/uL    IMMATURE GRANULOCYTES 0 Not Estab. %    ABS. IMM. GRANS. 0.0 0.0 - 0.1 x10E3/uL   CVD REPORT   Result Value Ref Range    INTERPRETATION Note      No visits with results within 3 Month(s) from this visit. Latest known visit with results is:   Appointment on 10/27/2021   Component Date Value Ref Range Status    Hemoglobin A1c 10/27/2021 5.6  4.8 - 5.6 % Final    Comment:          Prediabetes: 5.7 - 6.4           Diabetes: >6.4           Glycemic control for adults with diabetes: <7.0      VITAMIN D, 25-HYDROXY 10/27/2021 52.4  30.0 - 100.0 ng/mL Final    Comment: Vitamin D deficiency has been defined by the [facility] practice guideline as a  level of serum 25-OH vitamin D less than 20 ng/mL (1,2). The Endocrine Society went on to further define vitamin D  insufficiency as a level between 21 and 29 ng/mL (2). 1. IOM ([place] of Medicine). 2010. Dietary reference     intakes for calcium and D. [facility]: The     [employer]. 2. Orquidea GARZA, Clarke FRANCOIS, Sarah AUGUSTINE, et al.     Evaluation, treatment, and prevention of vitamin D     deficiency: an Endocrine Society clinical practice     guideline. JCEM. 2011 Jul; 96(7):1911-30.       TSH 10/27/2021 3.760  0.450 - 4.500 uIU/mL Final    Cholesterol, total 10/27/2021 222* 100 - 199 mg/dL Final    Triglyceride 10/27/2021 65  0 - 149 mg/dL Final    HDL Cholesterol 10/27/2021 81  >39 mg/dL Final    VLDL, calculated 10/27/2021 11  5 - 40 mg/dL Final    LDL, calculated 10/27/2021 130* 0 - 99 mg/dL Final    Glucose 10/27/2021 91  65 - 99 mg/dL Final    BUN 10/27/2021 14  8 - 27 mg/dL Final    Creatinine 10/27/2021 0.68  0.57 - 1.00 mg/dL Final    GFR est non-AA 10/27/2021 91  >59 mL/min/1.73 Final    GFR est AA 10/27/2021 105  >59 mL/min/1.73 Final    Comment: **In accordance with recommendations from the NKF-ASN Task force,**    [facility] is in the process of updating its eGFR calculation to the    2021 CKD-EPI creatinine equation that estimates kidney function    without a race variable.  BUN/Creatinine ratio 10/27/2021 21  12 - 28 Final    Sodium 10/27/2021 142  134 - 144 mmol/L Final    Potassium 10/27/2021 4.4  3.5 - 5.2 mmol/L Final    Chloride 10/27/2021 104  96 - 106 mmol/L Final    CO2 10/27/2021 27  20 - 29 mmol/L Final    Calcium 10/27/2021 9.4  8.7 - 10.3 mg/dL Final    Protein, total 10/27/2021 6.8  6.0 - 8.5 g/dL Final    Albumin 10/27/2021 4.4  3.8 - 4.8 g/dL Final    GLOBULIN, TOTAL 10/27/2021 2.4  1.5 - 4.5 g/dL Final    A-G Ratio 10/27/2021 1.8  1.2 - 2.2 Final    Bilirubin, total 10/27/2021 0.5  0.0 - 1.2 mg/dL Final    Alk. phosphatase 10/27/2021 44  44 - 121 IU/L Final                  **Please note reference interval change**    AST (SGOT) 10/27/2021 20  0 - 40 IU/L Final    ALT (SGPT) 10/27/2021 15  0 - 32 IU/L Final    WBC 10/27/2021 2.5* 3.4 - 10.8 x10E3/uL Final    RBC 10/27/2021 4.32  3.77 - 5.28 x10E6/uL Final    HGB 10/27/2021 12.9  11.1 - 15.9 g/dL Final    HCT 10/27/2021 39.0  34.0 - 46.6 % Final    MCV 10/27/2021 90  79 - 97 fL Final    MCH 10/27/2021 29.9  26.6 - 33.0 pg Final    MCHC 10/27/2021 33.1  31.5 - 35.7 g/dL Final    RDW 10/27/2021 12.7  11.7 - 15.4 % Final    PLATELET 88/70/4409 626  150 - 450 x10E3/uL Final    NEUTROPHILS 10/27/2021 42  Not Estab. % Final    Lymphocytes 10/27/2021 45  Not Estab. % Final    MONOCYTES 10/27/2021 7  Not Estab. % Final    EOSINOPHILS 10/27/2021 5  Not Estab. % Final    BASOPHILS 10/27/2021 1  Not Estab. % Final    ABS. NEUTROPHILS 10/27/2021 1.0* 1.4 - 7.0 x10E3/uL Final    Abs Lymphocytes 10/27/2021 1.1  0.7 - 3.1 x10E3/uL Final    ABS. MONOCYTES 10/27/2021 0.2  0.1 - 0.9 x10E3/uL Final    ABS.  EOSINOPHILS 10/27/2021 0.1  0.0 - 0.4 x10E3/uL Final  ABS. BASOPHILS 10/27/2021 0.0  0.0 - 0.2 x10E3/uL Final    IMMATURE GRANULOCYTES 10/27/2021 0  Not Estab. % Final    ABS. IMM. GRANS. 10/27/2021 0.0  0.0 - 0.1 x10E3/uL Final    INTERPRETATION 10/27/2021 Note   Final    Supplemental report is available. Follow-up and Dispositions    · Return in about 6 months (around 10/29/2022) for for medicare wellness.

## 2022-04-29 NOTE — PROGRESS NOTES
Jose Hill is a 78 y.o. female (: 1955) presenting to address:    Chief Complaint   Patient presents with    Follow-up    Abnormal Lab Results       Vitals:    22 1044   BP: 120/82   Pulse: 67   Resp: 13   Temp: 97.7 °F (36.5 °C)   TempSrc: Temporal   SpO2: 99%   Weight: 123 lb (55.8 kg)   Height: 5' 4.5\" (1.638 m)   PainSc:   2   PainLoc: Generalized       Hearing/Vision:   No exam data present    Learning Assessment:     Learning Assessment 2014   PRIMARY LEARNER Patient   HIGHEST LEVEL OF EDUCATION - PRIMARY LEARNER  4 YEARS OF COLLEGE   BARRIERS PRIMARY LEARNER NONE   PRIMARY LANGUAGE ENGLISH   LEARNER PREFERENCE PRIMARY READING     LISTENING   ANSWERED BY self   RELATIONSHIP SELF     Depression Screening:     3 most recent PHQ Screens 2022   Little interest or pleasure in doing things Not at all   Feeling down, depressed, irritable, or hopeless Not at all   Total Score PHQ 2 0     Fall Risk Assessment:     Fall Risk Assessment, last 12 mths 2022   Able to walk? Yes   Fall in past 12 months? 0   Do you feel unsteady? 0   Are you worried about falling 0   Number of falls in past 12 months -   Fall with injury? -     Abuse Screening:     Abuse Screening Questionnaire 10/19/2021   Do you ever feel afraid of your partner? N   Are you in a relationship with someone who physically or mentally threatens you? N   Is it safe for you to go home?  Y     ADL Assessment:     ADL Assessment 10/19/2021   Feeding yourself No Help Needed   Getting from bed to chair No Help Needed   Getting dressed No Help Needed   Bathing or showering No Help Needed   Walk across the room (includes cane/walker) No Help Needed   Using the telphone No Help Needed   Taking your medications No Help Needed   Preparing meals No Help Needed   Managing money (expenses/bills) No Help Needed   Moderately strenuous housework (laundry) No Help Needed   Shopping for personal items (toiletries/medicines) No Help Needed Shopping for groceries No Help Needed   Driving No Help Needed   Climbing a flight of stairs No Help Needed   Getting to places beyond walking distances No Help Needed        Coordination of Care Questionaire:   1. \"Have you been to the ER, urgent care clinic since your last visit? Hospitalized since your last visit? \" No    2. \"Have you seen or consulted any other health care providers outside of the [address] [place] [address] [place] since your last visit? \" Yes When: 4/22 Where: dermatology  Reason for visit: follow up and labs      3. For patients aged 39-70: Has the patient had a colonoscopy? Yes - no Care Gap present     If the patient is female:    4. For patients aged 41-77: Has the patient had a mammogram within the past 2 years? Yes - no Care Gap present    5. For patients aged 21-65: Has the patient had a pap smear? Yes - no Care Gap present    Advanced Directive:   1. Do you have an Advanced Directive? NO    2. Would you like information on Advanced Directives?  NO

## 2022-05-02 ENCOUNTER — APPOINTMENT (OUTPATIENT)
Dept: FAMILY MEDICINE CLINIC | Age: 67
End: 2022-05-02

## 2022-05-02 DIAGNOSIS — I25.119 CORONARY ARTERY DISEASE INVOLVING NATIVE CORONARY ARTERY OF NATIVE HEART WITH ANGINA PECTORIS (HCC): ICD-10-CM

## 2022-05-02 DIAGNOSIS — R79.89 ABNORMAL CBC: ICD-10-CM

## 2022-05-02 DIAGNOSIS — R73.03 PREDIABETES: ICD-10-CM

## 2022-05-03 LAB
ALBUMIN SERPL-MCNC: 4.5 G/DL (ref 3.8–4.8)
ALBUMIN/GLOB SERPL: 2 {RATIO} (ref 1.2–2.2)
ALP SERPL-CCNC: 50 IU/L (ref 44–121)
ALT SERPL-CCNC: 22 IU/L (ref 0–32)
AST SERPL-CCNC: 26 IU/L (ref 0–40)
BASOPHILS # BLD AUTO: 0 X10E3/UL (ref 0–0.2)
BASOPHILS NFR BLD AUTO: 1 %
BILIRUB SERPL-MCNC: 0.4 MG/DL (ref 0–1.2)
BUN SERPL-MCNC: 17 MG/DL (ref 8–27)
BUN/CREAT SERPL: 28 (ref 12–28)
CALCIUM SERPL-MCNC: 10.1 MG/DL (ref 8.7–10.3)
CHLORIDE SERPL-SCNC: 103 MMOL/L (ref 96–106)
CO2 SERPL-SCNC: 25 MMOL/L (ref 20–29)
CREAT SERPL-MCNC: 0.61 MG/DL (ref 0.57–1)
EGFR: 98 ML/MIN/1.73
EOSINOPHIL # BLD AUTO: 0 X10E3/UL (ref 0–0.4)
EOSINOPHIL NFR BLD AUTO: 1 %
ERYTHROCYTE [DISTWIDTH] IN BLOOD BY AUTOMATED COUNT: 12.8 % (ref 11.7–15.4)
FERRITIN SERPL-MCNC: 162 NG/ML (ref 15–150)
GLOBULIN SER CALC-MCNC: 2.3 G/DL (ref 1.5–4.5)
GLUCOSE SERPL-MCNC: 88 MG/DL (ref 65–99)
HBA1C MFR BLD: 5.8 % (ref 4.8–5.6)
HCT VFR BLD AUTO: 38.1 % (ref 34–46.6)
HGB BLD-MCNC: 12.7 G/DL (ref 11.1–15.9)
IMM GRANULOCYTES # BLD AUTO: 0 X10E3/UL (ref 0–0.1)
IMM GRANULOCYTES NFR BLD AUTO: 0 %
LYMPHOCYTES # BLD AUTO: 1.4 X10E3/UL (ref 0.7–3.1)
LYMPHOCYTES NFR BLD AUTO: 44 %
MCH RBC QN AUTO: 30.2 PG (ref 26.6–33)
MCHC RBC AUTO-ENTMCNC: 33.3 G/DL (ref 31.5–35.7)
MCV RBC AUTO: 91 FL (ref 79–97)
MONOCYTES # BLD AUTO: 0.3 X10E3/UL (ref 0.1–0.9)
MONOCYTES NFR BLD AUTO: 11 %
NEUTROPHILS # BLD AUTO: 1.3 X10E3/UL (ref 1.4–7)
NEUTROPHILS NFR BLD AUTO: 43 %
PLATELET # BLD AUTO: 160 X10E3/UL (ref 150–450)
POTASSIUM SERPL-SCNC: 4.6 MMOL/L (ref 3.5–5.2)
PROT SERPL-MCNC: 6.8 G/DL (ref 6–8.5)
RBC # BLD AUTO: 4.2 X10E6/UL (ref 3.77–5.28)
SODIUM SERPL-SCNC: 143 MMOL/L (ref 134–144)
WBC # BLD AUTO: 3.1 X10E3/UL (ref 3.4–10.8)

## 2022-05-19 NOTE — PROGRESS NOTES
Hemoglobin A1c is 5.8 prediabetes range advised lifestyle modification decrease carbohydrate and sugar  Low white blood cell count that has improved from last 6 months it was 2.5 now it is 3.1    All other labs are normal

## 2022-09-09 ENCOUNTER — PATIENT MESSAGE (OUTPATIENT)
Dept: FAMILY MEDICINE CLINIC | Age: 67
End: 2022-09-09

## 2022-09-09 NOTE — TELEPHONE ENCOUNTER
The symptoms need evaluation please triage patient if her symptoms are worsening she need to go to urgent care for evaluation if symptoms has been steady for few days no change she can schedule appointment

## 2022-09-09 NOTE — TELEPHONE ENCOUNTER
From: Esme Candelario  To: Servando Griffiths MD  Sent: 9/9/2022 9:03 AM EDT  Subject: Lingering symptoms    Hi Dr Fernando Artist. I've been experiencing weakness, exhaustion with slight activity, vertigo, feeling cold then sweaty. No fever. Are these still symptoms of Covid? Are there any remedy to alleviate these symptoms?  Or is something else going on?

## 2022-11-16 ENCOUNTER — OFFICE VISIT (OUTPATIENT)
Dept: FAMILY MEDICINE CLINIC | Age: 67
End: 2022-11-16
Payer: MEDICARE

## 2022-11-16 ENCOUNTER — APPOINTMENT (OUTPATIENT)
Dept: FAMILY MEDICINE CLINIC | Age: 67
End: 2022-11-16

## 2022-11-16 VITALS
OXYGEN SATURATION: 98 % | WEIGHT: 122.2 LBS | RESPIRATION RATE: 16 BRPM | TEMPERATURE: 98 F | SYSTOLIC BLOOD PRESSURE: 110 MMHG | BODY MASS INDEX: 20.36 KG/M2 | HEIGHT: 65 IN | HEART RATE: 71 BPM | DIASTOLIC BLOOD PRESSURE: 70 MMHG

## 2022-11-16 DIAGNOSIS — E55.9 VITAMIN D DEFICIENCY: ICD-10-CM

## 2022-11-16 DIAGNOSIS — E78.2 MIXED HYPERLIPIDEMIA: ICD-10-CM

## 2022-11-16 DIAGNOSIS — Q78.2 OSTEOPETROSIS: ICD-10-CM

## 2022-11-16 DIAGNOSIS — R73.03 PREDIABETES: ICD-10-CM

## 2022-11-16 DIAGNOSIS — Z12.31 BREAST CANCER SCREENING BY MAMMOGRAM: ICD-10-CM

## 2022-11-16 DIAGNOSIS — Z00.00 MEDICARE ANNUAL WELLNESS VISIT, SUBSEQUENT: Primary | ICD-10-CM

## 2022-11-16 DIAGNOSIS — G47.9 SLEEP DIFFICULTIES: ICD-10-CM

## 2022-11-16 DIAGNOSIS — Z00.00 MEDICARE ANNUAL WELLNESS VISIT, SUBSEQUENT: ICD-10-CM

## 2022-11-16 DIAGNOSIS — Z78.0 MENOPAUSE: ICD-10-CM

## 2022-11-16 PROCEDURE — 1123F ACP DISCUSS/DSCN MKR DOCD: CPT | Performed by: LEGAL MEDICINE

## 2022-11-16 PROCEDURE — 3017F COLORECTAL CA SCREEN DOC REV: CPT | Performed by: LEGAL MEDICINE

## 2022-11-16 PROCEDURE — G8536 NO DOC ELDER MAL SCRN: HCPCS | Performed by: LEGAL MEDICINE

## 2022-11-16 PROCEDURE — G8420 CALC BMI NORM PARAMETERS: HCPCS | Performed by: LEGAL MEDICINE

## 2022-11-16 PROCEDURE — G8432 DEP SCR NOT DOC, RNG: HCPCS | Performed by: LEGAL MEDICINE

## 2022-11-16 PROCEDURE — G9899 SCRN MAM PERF RSLTS DOC: HCPCS | Performed by: LEGAL MEDICINE

## 2022-11-16 PROCEDURE — G8427 DOCREV CUR MEDS BY ELIG CLIN: HCPCS | Performed by: LEGAL MEDICINE

## 2022-11-16 PROCEDURE — 1101F PT FALLS ASSESS-DOCD LE1/YR: CPT | Performed by: LEGAL MEDICINE

## 2022-11-16 PROCEDURE — G0439 PPPS, SUBSEQ VISIT: HCPCS | Performed by: LEGAL MEDICINE

## 2022-11-16 NOTE — PROGRESS NOTES
(AWV) The Initial Medicare Annual Wellness Exam PROGRESS NOTE    This is an Initial Medicare Annual Wellness Exam (AWV) (Performed 12 months after IPPE or effective date of Medicare Part B enrollment, Once in a lifetime)    I have reviewed the patient's medical history in detail and updated the computerized patient record. Kvng Guillen is a 75 y.o.   female and presents for an annual wellness exam     Walk 2 miles 5 times per week it take her 1 hour   She live with her  and take care of her grand children   She is complaining about longstanding insomnia she tried over-the-counter melatonin and Benadryl with no much help, she has been having this for many years but now is causing her tiredness and exhaustion    Patient Active Problem List   Diagnosis Code    Hyperplastic colon polyp K63.5    Glaucoma H40.9    Other osteoporosis without current pathological fracture M81.8    Routine health maintenance Z00.00    Cyst of joint of hand M25.849    Recurrent sinusitis J32.9    Postmenopausal state Z78.0    Coronary artery disease involving native coronary artery of native heart with angina pectoris ([facility]) I25.119    Paroxysmal nocturnal dyspnea R06.00    Prediabetes R73.03    Aspirin intolerance Z78.9    Degenerative arthritis of finger M19.049    Senile osteoporosis M81.0     Patient Active Problem List    Diagnosis Date Noted    Senile osteoporosis 10/19/2021    Degenerative arthritis of finger 06/24/2017    Paroxysmal nocturnal dyspnea 06/20/2017    Prediabetes 06/20/2017    Aspirin intolerance 06/20/2017    Coronary artery disease involving native coronary artery of native heart with angina pectoris ([facility] [address] 05/03/2016    Postmenopausal state 11/10/2015    Cyst of joint of hand 01/28/2015    Recurrent sinusitis 01/28/2015    Other osteoporosis without current pathological fracture 04/24/2014    Routine health maintenance 04/24/2014    Hyperplastic colon polyp 04/02/2013    Glaucoma 04/02/2013     Current Outpatient Medications   Medication Sig Dispense Refill    suvorexant (BELSOMRA) 5 mg tablet Take 1 Tablet by mouth nightly as needed for Insomnia for up to 30 days. Max Daily Amount: 5 mg. 30 Tablet 1    biotin 5,000 mcg TbDi       Loratadine (Claritin RediTabs) 5 mg TbDi       fluticasone propionate (FLONASE) 50 mcg/actuation nasal spray 2 Sprays by Both Nostrils route daily. multivitamin with iron tablet Take 1 Tab by mouth daily. calcium-cholecalciferol, d3, 600-125 mg-unit tab Take  by mouth. 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    Hyperlipidemia     Menopause     Mitral valve prolapse 1980s    s/p cardiac workup; intermittent in nature    Palpitations     PVC (premature ventricular contraction)     S/P cardiac cath 05/16    Mid LAD 40-50% with myocardial bridging    Syncope     last time in 1990's     Past Surgical History:   Procedure Laterality Date    HX HEMORRHOIDECTOMY  1980s    HX TUBAL LIGATION  1985     Family History   Problem Relation Age of Onset    Glaucoma Mother     Hypertension Mother     Glaucoma Father     Cancer Father         Colon    Hypertension Father     Asthma Brother     Hypertension Brother      Social History     Tobacco Use    Smoking status: Never    Smokeless tobacco: Never   Substance Use Topics    Alcohol use: No       ROS   History obtained from the patient  General ROS: negative for - chills, fatigue or fever  Psychological ROS: negative for - anxiety or depression positive for insomnia  Ophthalmic ROS: negative for - blurry vision, decreased vision or double vision  ENT ROS: negative for - epistaxis or headaches  Allergy and Immunology ROS: positive for - nasal congestion and postnasal drip  Hematological and Lymphatic ROS: negative for - bleeding problems, blood clots or blood transfusions  Endocrine ROS: negative for - breast changes  Breast ROS: negative for breast lumps  Respiratory ROS: no cough, shortness of breath, or wheezing  Cardiovascular ROS: no chest pain or dyspnea on exertion  negative   Gastrointestinal ROS: no abdominal pain, change in bowel habits, or black or bloody stools  Genito-Urinary ROS: no dysuria, trouble voiding, or hematuria  Musculoskeletal ROS: positive for - joint pain  negative for - gait disturbance or joint swelling  Neurological ROS: no TIA or stroke symptoms  Dermatological ROS: negative for - mole changes or skin lesion changes           History     Past Medical History:   Diagnosis Date    Environmental allergies     outside and inside; [address] Allergy    Glaucoma     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 Max Daily Amount: 5 mg. 30 Tablet 1    biotin 5,000 mcg TbDi       Loratadine (Claritin RediTabs) 5 mg TbDi       fluticasone propionate (FLONASE) 50 mcg/actuation nasal spray 2 Sprays by Both Nostrils route daily. multivitamin with iron tablet Take 1 Tab by mouth daily. calcium-cholecalciferol, d3, 600-125 mg-unit tab Take  by mouth.        Allergies   Allergen Reactions    Ciprofloxacin Diarrhea    Statins-Hmg-Coa Reductase Inhibitors Myalgia     Myalgia & swelling; failed several different statins     Family History   Problem Relation Age of Onset    Glaucoma Mother     Hypertension Mother     Glaucoma Father     Cancer Father         Colon    Hypertension Father     Asthma Brother     Hypertension Brother      Social History     Tobacco Use    Smoking status: Never    Smokeless tobacco: Never   Substance Use Topics    Alcohol use: No     Patient Active Problem List   Diagnosis Code    Hyperplastic colon polyp K63.5    Glaucoma H40.9    Other osteoporosis without current pathological fracture M81.8    Routine health maintenance Z00.00    Cyst of joint of hand M25.849    Recurrent sinusitis J32.9    Postmenopausal state Z78.0    Coronary artery disease involving native coronary artery of native heart with angina pectoris ([facility]) I25.119    Paroxysmal nocturnal dyspnea R06.00    Prediabetes R73.03    Aspirin intolerance Z78.9    Degenerative arthritis of finger M19.049    Senile osteoporosis M81.0       Health Maintenance History  colonoscopy: Up-to-date  Chest CT: Never smoker  Eye exam: [place] [facility] she is due to be scheduled   Dexascan she is due to be scheduled   I    Depression Risk Factor Screening:      Patient Health Questionnaire (PHQ-2)   Over the last 2 weeks, how often have you been bothered by any of the following problems? Little interest or pleasure in doing things? Not at all. [0]  Feeling down, depressed, or hopeless? Not at all. [0]    Total Score: 0/6  PHQ-2 Assessment Scoring:   A score of 2 or more requires further screening with the PHQ-9    Alcohol Risk Factor Screening:     Women: On any occasion during the past 3 months, have you had more than 3 drinks containing alcohol? No    Do you average more than 7 drinks per week? No   Men: On any occasion during the past 3 months, have you had more than 4 drinks containing alcohol? Do you average more than 14 drinks per week?     Functional Ability and Level of Safety:     Hearing Loss    Hearing is good. Activities of Daily Living   Self-care. Requires assistance with: no ADLs    Fall Risk   No fall risk factors    Abuse Screen   Patient is not abused  None    Examination   Physical Examination  Vitals:    11/16/22 0902   BP: 110/70   Pulse: 71   Resp: 16   Temp: 98 °F (36.7 °C)   TempSrc: Temporal   SpO2: 98%   Weight: 122 lb 3.2 oz (55.4 kg)   Height: 5' 4.5\" (1.638 m)      Body mass index is 20.65 kg/m². Evaluation of Cognitive Function:  Mood/affect:Great   Appearance:well dressed   Family member/caregiver input:Not accompanied     alert, well appearing, and in no distress    Patient Care Team:  Kashif Russo MD as PCP - General (Internal Medicine Physician)  Kashif Russo MD as PCP - [facility] Empaneled Provider  Sakina Cristina MD as Consulting Provider (Gastroenterology)  Dasha Vega MD as Consulting Provider (Ophthalmology)  Khloe Thomas MD as Consulting Provider (Otolaryngology)  Jennie Meier, [address] as Physician Assistant (Dermatology Physician)    End-of-life planning  Advanced Directive in the case than an injury or illness causes the patient to be unable to make health care decisions    Health Care Directive or Living Will: yes    Advice/Referrals/Counselling/Plan:   Education and counseling provided:  Are appropriate based on today's review and evaluation  Pneumococcal Vaccine  Influenza Vaccine  Screening Mammography  Screening Pap and pelvic (covered once every 2 years)  Colorectal cancer screening tests  Cardiovascular screening blood test  Bone mass measurement (DEXA)  Screening for glaucoma  Diabetes screening test  Include in education list (weight loss, physical activity, smoking cessation, fall prevention, and nutrition)    ICD-10-CM ICD-9-CM    1. Medicare annual wellness visit, subsequent  X27.47 G70.5 METABOLIC PANEL, COMPREHENSIVE      CBC WITH AUTOMATED DIFF      2.  Menopause  Z78.0 627.2 DEXA BONE DENSITY STUDY AXIAL      3. Breast cancer screening by mammogram  Z12.31 V76.12 [facility] MAMMO BI SCREENING INCL CAD      4. Mixed hyperlipidemia  V49.2 876.9 METABOLIC PANEL, COMPREHENSIVE      5. 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 Vitamin D deficiency  E55.9 268.9 VITAMIN D, 25 HYDROXY      8. Sleep difficulties  G47.9 780.50 suvorexant (BELSOMRA) 5 mg tablet        Encounter Diagnoses   Name Primary?     Medicare annual wellness visit, subsequent Yes    Menopause     Breast cancer screening by mammogram     Mixed hyperlipidemia     Prediabetes     Osteopetrosis     Vitamin D deficiency     Sleep difficulties      Orders Placed This Encounter    [facility] MAMMO BI SCREENING INCL CAD    DEXA BONE DENSITY STUDY AXIAL    HEMOGLOBIN A1C W/O EAG    VITAMIN D, 25 HYDROXY    METABOLIC PANEL, COMPREHENSIVE    CBC WITH AUTOMATED DIFF    suvorexant (BELSOMRA) 5 mg tablet     Orders Placed This Encounter    [facility] MAMMO BI SCREENING INCL CAD     Standing Status:   Future     Standing Expiration Date:   12/17/2023     Scheduling Instructions:      [address]    DEXA BONE DENSITY STUDY AXIAL     Standing Status:   Future     Standing Expiration Date:   12/16/2023     Scheduling Instructions:      [address]    HEMOGLOBIN A1C W/O EAG     Standing Status:   Future     Number of Occurrences:   1     Standing Expiration Date:   11/17/2023    VITAMIN D, 25 HYDROXY     Standing Status:   Future     Number of Occurrences:   1     Standing Expiration Date:   39/66/7241    METABOLIC PANEL, COMPREHENSIVE     Standing Status:   Future     Number of Occurrences:   1     Standing Expiration Date:   11/17/2023    CBC WITH AUTOMATED DIFF     Standing Status:   Future     Number of Occurrences:   1     Standing Expiration Date:   11/17/2023    suvorexant (BELSOMRA) 5 mg tablet     Sig: Take 1 Tablet by mouth nightly as needed for Insomnia for up to 30 days. Max Daily Amount: 5 mg. Dispense:  30 Tablet     Refill:  1     Orders Placed This Encounter    [facility] MAMMO BI SCREENING INCL CAD    DEXA BONE DENSITY STUDY AXIAL    HEMOGLOBIN A1C W/O EAG    VITAMIN D, 25 HYDROXY    METABOLIC PANEL, COMPREHENSIVE    CBC WITH AUTOMATED DIFF    suvorexant (BELSOMRA) 5 mg tablet     Diagnoses and all orders for this visit:    1. Medicare annual wellness visit, subsequent  -     METABOLIC PANEL, COMPREHENSIVE; Future  -     CBC WITH AUTOMATED DIFF; Future    2. Menopause  -     DEXA BONE DENSITY STUDY AXIAL; Future    3. Breast cancer screening by mammogram  -     [facility] MAMMO BI SCREENING INCL CAD; Future    4. Mixed hyperlipidemia  -     METABOLIC PANEL, COMPREHENSIVE; Future    5. Prediabetes  -     HEMOGLOBIN A1C W/O EAG; Future  -     METABOLIC PANEL, COMPREHENSIVE; Future    6. Osteopetrosis  She takes  calcium with vitamin D and multivitamins with iron  -     VITAMIN D, 25 HYDROXY; Future    7. Vitamin D deficiency  -     VITAMIN D, 25 HYDROXY; Future    8. Sleep difficulties  -     suvorexant (BELSOMRA) 5 mg tablet; Take 1 Tablet by mouth nightly as needed for Insomnia for up to 30 days. Max Daily Amount: 5 mg. Follow-up and Dispositions    Return for as per results, for medicare wellness. current treatment plan is effective, no change in therapy. Brief written plan, checklist    I have discussed the diagnosis with the patient and the intended plan as seen in the above orders. The patient has received an after-visit summary and questions were answered concerning future plans. I have discussed medication side effects and warnings with the patient as well. I have reviewed the plan of care with the patient, accepted their input and they are in agreement with the treatment goals. Follow-up and Dispositions    Return for as per results, for medicare wellness.            ____________________________________________________________    Problem Assessment    for treatment of   Chief Complaint   Patient presents with    Annual Wellness Visit

## 2022-11-16 NOTE — PROGRESS NOTES
Timothy Olivier is a 75 y.o.  female presents today for office visit for follow up. Pt would also like to discuss [address] Pt is not fasting. Pt is in Room # 14      1. Have you been to the ER, urgent care clinic since your last visit? Hospitalized since your last visit? No    2. Have you seen or consulted any other health care providers outside of the [address] [place] [address] since your last visit? Include any pap smears or colon screening. No    Upcoming Appts  none    Health Maintenance reviewed     VORB: No orders of the defined types were placed in this encounter.   Amber Dumas LPN

## 2022-11-17 LAB
25(OH)D3+25(OH)D2 SERPL-MCNC: 51.5 NG/ML (ref 30–100)
ALBUMIN SERPL-MCNC: 4.5 G/DL (ref 3.8–4.8)
ALBUMIN/GLOB SERPL: 2 {RATIO} (ref 1.2–2.2)
ALP SERPL-CCNC: 49 IU/L (ref 44–121)
ALT SERPL-CCNC: 22 IU/L (ref 0–32)
AST SERPL-CCNC: 30 IU/L (ref 0–40)
BASOPHILS # BLD AUTO: 0 X10E3/UL (ref 0–0.2)
BASOPHILS NFR BLD AUTO: 1 %
BILIRUB SERPL-MCNC: 0.2 MG/DL (ref 0–1.2)
BUN SERPL-MCNC: 12 MG/DL (ref 8–27)
BUN/CREAT SERPL: 24 (ref 12–28)
CALCIUM SERPL-MCNC: 9.4 MG/DL (ref 8.7–10.3)
CHLORIDE SERPL-SCNC: 104 MMOL/L (ref 96–106)
CO2 SERPL-SCNC: 23 MMOL/L (ref 20–29)
CREAT SERPL-MCNC: 0.5 MG/DL (ref 0.57–1)
EGFR: 102 ML/MIN/1.73
EOSINOPHIL # BLD AUTO: 0 X10E3/UL (ref 0–0.4)
EOSINOPHIL NFR BLD AUTO: 1 %
ERYTHROCYTE [DISTWIDTH] IN BLOOD BY AUTOMATED COUNT: 13 % (ref 11.7–15.4)
GLOBULIN SER CALC-MCNC: 2.3 G/DL (ref 1.5–4.5)
GLUCOSE SERPL-MCNC: 95 MG/DL (ref 70–99)
HBA1C MFR BLD: 5.7 % (ref 4.8–5.6)
HCT VFR BLD AUTO: 34.5 % (ref 34–46.6)
HGB BLD-MCNC: 12.8 G/DL (ref 11.1–15.9)
IMM GRANULOCYTES # BLD AUTO: 0 X10E3/UL (ref 0–0.1)
IMM GRANULOCYTES NFR BLD AUTO: 0 %
LYMPHOCYTES # BLD AUTO: 0.8 X10E3/UL (ref 0.7–3.1)
LYMPHOCYTES NFR BLD AUTO: 41 %
MCH RBC QN AUTO: 34.3 PG (ref 26.6–33)
MCHC RBC AUTO-ENTMCNC: 37.1 G/DL (ref 31.5–35.7)
MCV RBC AUTO: 93 FL (ref 79–97)
MONOCYTES # BLD AUTO: 0.3 X10E3/UL (ref 0.1–0.9)
MONOCYTES NFR BLD AUTO: 17 %
MORPHOLOGY BLD-IMP: ABNORMAL
NEUTROPHILS # BLD AUTO: 0.8 X10E3/UL (ref 1.4–7)
NEUTROPHILS NFR BLD AUTO: 40 %
PLATELET # BLD AUTO: 162 X10E3/UL (ref 150–450)
POTASSIUM SERPL-SCNC: 4.5 MMOL/L (ref 3.5–5.2)
PROT SERPL-MCNC: 6.8 G/DL (ref 6–8.5)
RBC # BLD AUTO: 3.73 X10E6/UL (ref 3.77–5.28)
SODIUM SERPL-SCNC: 141 MMOL/L (ref 134–144)
WBC # BLD AUTO: 1.9 X10E3/UL (ref 3.4–10.8)

## 2022-11-17 NOTE — PROGRESS NOTES
Patient white cell count is lower than her baseline, recommend repeat CBC in 2 weeks, if the patient having any fever or sore throat she should go to the hospital.  Also has the patient seen hematologist in the past for low white cell count problem? ?     Normal vitamin D level  Normal kidney liver function  Hemoglobin A1c is 5.7 prediabetes continue with lifestyle modifications decrease carbohydrate and sugar intake

## 2023-01-06 ENCOUNTER — HOSPITAL ENCOUNTER (OUTPATIENT)
Dept: WOMENS IMAGING | Age: 68
End: 2023-01-06
Attending: LEGAL MEDICINE
Payer: MEDICARE

## 2023-01-06 ENCOUNTER — HOSPITAL ENCOUNTER (OUTPATIENT)
Dept: BONE DENSITY | Age: 68
End: 2023-01-06
Attending: LEGAL MEDICINE
Payer: MEDICARE

## 2023-01-06 DIAGNOSIS — Z12.31 BREAST CANCER SCREENING BY MAMMOGRAM: ICD-10-CM

## 2023-01-06 DIAGNOSIS — Z78.0 MENOPAUSE: ICD-10-CM

## 2023-01-06 PROCEDURE — 77063 BREAST TOMOSYNTHESIS BI: CPT

## 2023-01-06 PROCEDURE — 77080 DXA BONE DENSITY AXIAL: CPT

## 2023-01-12 NOTE — PROGRESS NOTES
Informed pt of Bone density results and treatment plan. Pt wanted to know if she could just take vitamin supplements rather than taking a medication?

## 2023-01-12 NOTE — PROGRESS NOTES
BMD measures consistent with osteopenia.     This is a stage before osteoporosis, patient need to increase physical activity weightbearing exercise, walking and cardiovascular exercises as tolerated, to be adherent to vitamin D supplements, calcium rich diet, and to take calcium supplements, test to be repeated in 2 years   Comparing this result with the previous studies there has been decrease in the bone density  Patient has the option to start medication like Fosamax once a week or Boniva once a months and also that her option of injection every 6 months she gets it in the office and that subjective to insurance coverage

## 2023-01-14 NOTE — PROGRESS NOTES
Yes patient can take calcium rich diet calcium supplement and vitamin D supplement and increase activity as tolerated and recheck in 2 years

## 2023-05-08 ENCOUNTER — PATIENT MESSAGE (OUTPATIENT)
Dept: FAMILY MEDICINE CLINIC | Facility: CLINIC | Age: 68
End: 2023-05-08

## 2023-05-08 NOTE — TELEPHONE ENCOUNTER
From: Sandy Medina  To: Dr. Medina Araya: 5/8/2023 3:06 PM EDT  Subject: Allergy    Hi Dr Ramírez Goes. Can I have a test for Gluten sensitivity or allergy? I have had constipation, diarrhea, headaches, breakouts on my face & head. I eliminated gluten from my diet last Thursday & my headaches & breakouts had calmed down. I have a history of food allergies in my family. Thanks.     Martir Jimenez

## 2023-05-09 NOTE — TELEPHONE ENCOUNTER
Pt it scheduled on:   Future Appointments   Date Time Provider Giulia Nel   5/25/2023 11:15 AM Rene Cordova MD [facility] BS AMB

## 2023-05-22 SDOH — ECONOMIC STABILITY: FOOD INSECURITY: WITHIN THE PAST 12 MONTHS, THE FOOD YOU BOUGHT JUST DIDN'T LAST AND YOU DIDN'T HAVE MONEY TO GET MORE.: NEVER TRUE

## 2023-05-22 SDOH — ECONOMIC STABILITY: INCOME INSECURITY: HOW HARD IS IT FOR YOU TO PAY FOR THE VERY BASICS LIKE FOOD, HOUSING, MEDICAL CARE, AND HEATING?: NOT HARD AT ALL

## 2023-05-22 SDOH — ECONOMIC STABILITY: TRANSPORTATION INSECURITY
IN THE PAST 12 MONTHS, HAS LACK OF TRANSPORTATION KEPT YOU FROM MEETINGS, WORK, OR FROM GETTING THINGS NEEDED FOR DAILY LIVING?: NO

## 2023-05-22 SDOH — ECONOMIC STABILITY: HOUSING INSECURITY
IN THE LAST 12 MONTHS, WAS THERE A TIME WHEN YOU DID NOT HAVE A STEADY PLACE TO SLEEP OR SLEPT IN A SHELTER (INCLUDING NOW)?: NO

## 2023-05-22 SDOH — ECONOMIC STABILITY: FOOD INSECURITY: WITHIN THE PAST 12 MONTHS, YOU WORRIED THAT YOUR FOOD WOULD RUN OUT BEFORE YOU GOT MONEY TO BUY MORE.: NEVER TRUE

## 2023-05-25 ENCOUNTER — OFFICE VISIT (OUTPATIENT)
Dept: FAMILY MEDICINE CLINIC | Facility: CLINIC | Age: 68
End: 2023-05-25
Payer: MEDICARE

## 2023-05-25 VITALS
OXYGEN SATURATION: 99 % | TEMPERATURE: 97.6 F | WEIGHT: 122.2 LBS | HEART RATE: 69 BPM | SYSTOLIC BLOOD PRESSURE: 118 MMHG | DIASTOLIC BLOOD PRESSURE: 80 MMHG | RESPIRATION RATE: 18 BRPM | BODY MASS INDEX: 20.36 KG/M2 | HEIGHT: 65 IN

## 2023-05-25 DIAGNOSIS — G47.00 INSOMNIA, UNSPECIFIED TYPE: ICD-10-CM

## 2023-05-25 DIAGNOSIS — K90.41 GLUTEN INTOLERANCE: Primary | ICD-10-CM

## 2023-05-25 DIAGNOSIS — G47.9 SLEEPING DIFFICULTY: ICD-10-CM

## 2023-05-25 DIAGNOSIS — L65.9 HAIR LOSS: ICD-10-CM

## 2023-05-25 PROCEDURE — 99214 OFFICE O/P EST MOD 30 MIN: CPT | Performed by: LEGAL MEDICINE

## 2023-05-25 PROCEDURE — 1123F ACP DISCUSS/DSCN MKR DOCD: CPT | Performed by: LEGAL MEDICINE

## 2023-05-25 RX ORDER — SWAB
SWAB, NON-MEDICATED MISCELLANEOUS DAILY
COMMUNITY

## 2023-05-25 ASSESSMENT — ENCOUNTER SYMPTOMS
ABDOMINAL PAIN: 0
BACK PAIN: 0
CHEST TIGHTNESS: 0
COUGH: 0
CHOKING: 0
ANAL BLEEDING: 0
FACIAL SWELLING: 0
EYE DISCHARGE: 0
BLOOD IN STOOL: 0
CONSTIPATION: 0
EYE ITCHING: 0
WHEEZING: 0
DIARRHEA: 0
NAUSEA: 0
SORE THROAT: 0
EYE REDNESS: 0
EYE PAIN: 0
APNEA: 0
VOMITING: 0
SHORTNESS OF BREATH: 0

## 2023-05-25 ASSESSMENT — PATIENT HEALTH QUESTIONNAIRE - PHQ9
SUM OF ALL RESPONSES TO PHQ9 QUESTIONS 1 & 2: 0
SUM OF ALL RESPONSES TO PHQ QUESTIONS 1-9: 0
1. LITTLE INTEREST OR PLEASURE IN DOING THINGS: 0
2. FEELING DOWN, DEPRESSED OR HOPELESS: 0
SUM OF ALL RESPONSES TO PHQ QUESTIONS 1-9: 0

## 2023-05-25 NOTE — PROGRESS NOTES
Ernie Marques is a 75 y.o. female (: 1955) presenting to address:    Chief Complaint   Patient presents with    Referral - General     Allergist- pt wants to see if allergic to gluten. Pt reports having n/v, break outs on face, headaches and constipation       Vitals:    23 1115   BP: 118/80   Pulse: 69   Resp: 18   Temp: 97.6 °F (36.4 °C)   SpO2: 99%       Coordination of Care Questionaire:   1. \"Have you been to the ER, urgent care clinic since your last visit? Hospitalized since your last visit? \" Yes When: Bharat Ramirez Where: Lenny Mota on face    2. \"Have you seen or consulted any other health care providers outside of the [address] [place] [address] since your last visit? \" No     3. For patients aged 39-70: Has the patient had a colonoscopy? Yes - no Care Gap present    If the patient is female:    4. For patients aged 41-77: Has the patient had a mammogram within the past 2 years? Yes - no Care Gap present    5. For patients aged 21-65: Has the patient had a pap smear? Yes - no Care Gap present    Advanced Directive:   1. Do you have an Advanced Directive? No    2. Would you like information on Advanced Directives?  No

## 2023-05-25 NOTE — PROGRESS NOTES
Ernie Marques     Chief Complaint   Patient presents with    Referral - General     Allergist- pt wants to see if allergic to gluten. Pt reports having n/v, break outs on face, headaches and constipation     /80 (Site: Right Upper Arm, Position: Sitting, Cuff Size: Medium Adult)   Pulse 69   Temp 97.6 °F (36.4 °C) (Temporal)   Resp 18   Ht 5' 4.5\" (1.638 m)   Wt 122 lb 3.2 oz (55.4 kg)   SpO2 99%   BMI 20.65 kg/m²         HPI:  Ernie Marques is here for concerns for ? ? 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    Hyperlipidemia     Menopause     Mitral valve prolapse 1980s    s/p cardiac workup; intermittent in nature    Palpitations     PVC (premature ventricular contraction)     S/P cardiac cath 05/16    Mid LAD 40-50% with myocardial bridging    Syncope     last time in 1990's     Past Surgical History:   Procedure Laterality Date    HEMORRHOID SURGERY  1980s    TUBAL LIGATION  1985     Social History     Tobacco Use    Smoking status: Never    Smokeless tobacco: Never   Substance Use Topics    Alcohol use: No       Family History   Problem Relation Age of Onset    Hypertension Brother     Asthma Brother     Hypertension Father     Cancer Father         Colon    Hypertension Mother     Glaucoma Mother     Glaucoma Father        Review of Systems   Constitutional:  Negative for activity change, appetite change, chills, diaphoresis, fatigue and fever. HENT:  Negative for congestion, ear discharge, ear pain, facial swelling, hearing loss and sore throat. Eyes:  Negative for pain, discharge, redness and itching.    Respiratory:  Negative for apnea, cough, choking, chest tightness, shortness of breath

## 2023-05-30 LAB
GLIADIN ANTIBODIES IGA: <0.5 U/ML
GLIADIN ANTIBODIES IGG: <0.5 U/ML
IMMUNOGLOBULIN A, QT.: 160 MG/DL (ref 70–400)
TISSUE TRANSGLUTAMINASE ANTIBODY IGG: <0.8 U/ML
TISSUE TRANSGLUTAMINASE IGA: <0.5 U/ML

## 2023-06-30 ENCOUNTER — OFFICE VISIT (OUTPATIENT)
Dept: FAMILY MEDICINE CLINIC | Facility: CLINIC | Age: 68
End: 2023-06-30
Payer: MEDICARE

## 2023-06-30 VITALS
TEMPERATURE: 97.7 F | HEART RATE: 86 BPM | SYSTOLIC BLOOD PRESSURE: 114 MMHG | HEIGHT: 65 IN | RESPIRATION RATE: 18 BRPM | WEIGHT: 122.8 LBS | BODY MASS INDEX: 20.46 KG/M2 | DIASTOLIC BLOOD PRESSURE: 71 MMHG | OXYGEN SATURATION: 98 %

## 2023-06-30 DIAGNOSIS — J06.9 ACUTE URI: Primary | ICD-10-CM

## 2023-06-30 PROCEDURE — 1123F ACP DISCUSS/DSCN MKR DOCD: CPT | Performed by: INTERNAL MEDICINE

## 2023-06-30 PROCEDURE — 99213 OFFICE O/P EST LOW 20 MIN: CPT | Performed by: INTERNAL MEDICINE

## 2023-06-30 RX ORDER — AZITHROMYCIN 250 MG/1
250 TABLET, FILM COATED ORAL SEE ADMIN INSTRUCTIONS
Qty: 6 TABLET | Refills: 0 | Status: SHIPPED | OUTPATIENT
Start: 2023-06-30 | End: 2023-07-05

## 2023-06-30 RX ORDER — BENZONATATE 200 MG/1
200 CAPSULE ORAL 3 TIMES DAILY PRN
Qty: 20 CAPSULE | Refills: 0 | Status: SHIPPED | OUTPATIENT
Start: 2023-06-30 | End: 2023-07-07

## 2023-06-30 ASSESSMENT — ENCOUNTER SYMPTOMS
SHORTNESS OF BREATH: 0
ABDOMINAL PAIN: 0
SORE THROAT: 1
WHEEZING: 0

## 2023-07-10 ENCOUNTER — TELEPHONE (OUTPATIENT)
Dept: FAMILY MEDICINE CLINIC | Facility: CLINIC | Age: 68
End: 2023-07-10

## 2023-07-10 NOTE — TELEPHONE ENCOUNTER
Patient seen by Alice Gregory on 6/30/23 for URI. in the visit she was prescribed zpack for her symptoms but she has not seen improvement and states that the symptoms are worsening. Please advise. No future appointments.   Last seen 6/30/23 trini Cavanaugh

## 2023-07-11 ENCOUNTER — OFFICE VISIT (OUTPATIENT)
Dept: FAMILY MEDICINE CLINIC | Facility: CLINIC | Age: 68
End: 2023-07-11
Payer: MEDICARE

## 2023-07-11 VITALS
TEMPERATURE: 97.7 F | DIASTOLIC BLOOD PRESSURE: 80 MMHG | HEIGHT: 65 IN | SYSTOLIC BLOOD PRESSURE: 138 MMHG | WEIGHT: 122.6 LBS | OXYGEN SATURATION: 99 % | BODY MASS INDEX: 20.43 KG/M2 | HEART RATE: 78 BPM | RESPIRATION RATE: 18 BRPM

## 2023-07-11 DIAGNOSIS — J20.9 ACUTE BRONCHITIS, UNSPECIFIED ORGANISM: Primary | ICD-10-CM

## 2023-07-11 PROCEDURE — 99213 OFFICE O/P EST LOW 20 MIN: CPT | Performed by: INTERNAL MEDICINE

## 2023-07-11 PROCEDURE — 1123F ACP DISCUSS/DSCN MKR DOCD: CPT | Performed by: INTERNAL MEDICINE

## 2023-07-11 RX ORDER — LEVOFLOXACIN 500 MG/1
500 TABLET, FILM COATED ORAL DAILY
Qty: 7 TABLET | Refills: 0 | Status: SHIPPED | OUTPATIENT
Start: 2023-07-11 | End: 2023-07-18

## 2023-07-11 RX ORDER — BENZONATATE 200 MG/1
200 CAPSULE ORAL 3 TIMES DAILY PRN
Qty: 20 CAPSULE | Refills: 0 | Status: SHIPPED | OUTPATIENT
Start: 2023-07-11

## 2023-07-11 RX ORDER — PREDNISONE 10 MG/1
TABLET ORAL
Qty: 1 EACH | Refills: 0 | Status: SHIPPED | OUTPATIENT
Start: 2023-07-11

## 2023-07-11 ASSESSMENT — PATIENT HEALTH QUESTIONNAIRE - PHQ9
1. LITTLE INTEREST OR PLEASURE IN DOING THINGS: 0
SUM OF ALL RESPONSES TO PHQ QUESTIONS 1-9: 0
SUM OF ALL RESPONSES TO PHQ QUESTIONS 1-9: 0
SUM OF ALL RESPONSES TO PHQ9 QUESTIONS 1 & 2: 0
SUM OF ALL RESPONSES TO PHQ QUESTIONS 1-9: 0
2. FEELING DOWN, DEPRESSED OR HOPELESS: 0
SUM OF ALL RESPONSES TO PHQ QUESTIONS 1-9: 0

## 2023-07-11 ASSESSMENT — ENCOUNTER SYMPTOMS
SORE THROAT: 0
WHEEZING: 0
ABDOMINAL PAIN: 0

## 2023-07-11 NOTE — PROGRESS NOTES
HISTORY OF PRESENT ILLNESS  Vernel Dakins is a 75 y.o. female    HPI    C/o cough/chest congestion, coughing yellow sputum, sob at times, no wheezing, no fever or chills, no s/t, no ears pain, she completed zpak course but not feeling better,   Review of Systems   Constitutional:  Negative for chills, diaphoresis and fever. HENT:  Negative for ear pain and sore throat. Respiratory:  Negative for wheezing. Cardiovascular:  Negative for chest pain. Gastrointestinal:  Negative for abdominal pain. Physical Exam  Vitals reviewed. HENT:      Right Ear: Tympanic membrane normal.      Left Ear: Tympanic membrane normal.      Mouth/Throat:      Pharynx: No oropharyngeal exudate or posterior oropharyngeal erythema. Cardiovascular:      Rate and Rhythm: Normal rate and regular rhythm. Heart sounds: No murmur heard. Pulmonary:      Effort: Pulmonary effort is normal.      Breath sounds: Normal breath sounds. No wheezing or rales. Chest:      Chest wall: No tenderness. Lymphadenopathy:      Cervical: No cervical adenopathy. Skin:     Findings: No rash. ASSESSMENT and PLAN    1. Acute bronchitis, unspecified organism  -     XR CHEST (2 VIEWS); Future  -     levoFLOXacin (LEVAQUIN) 500 MG tablet; Take 1 tablet by mouth daily for 7 days, Disp-7 tablet, R-0Normal  -     predniSONE 10 MG (21) TBPK; As per dose pack instructions, Disp-1 each, R-0Normal  -     benzonatate (TESSALON) 200 MG capsule; Take 1 capsule by mouth 3 times daily as needed for Cough, Disp-20 capsule, R-0Normal         Return if symptoms worsen or fail to improve.

## 2023-07-11 NOTE — TELEPHONE ENCOUNTER
Pt scheduled today:   Future Appointments   Date Time Provider [address]  [address]   7/11/2023  2:00 PM Ata Linder MD [facility] BS AMB

## 2023-07-21 ENCOUNTER — TELEPHONE (OUTPATIENT)
Dept: FAMILY MEDICINE CLINIC | Facility: CLINIC | Age: 68
End: 2023-07-21

## 2023-07-21 NOTE — TELEPHONE ENCOUNTER
PATIENT REQUESTING TO PAULINE TO DR David Fairchild SINCE  IS CURRENT PATIENT - PLEASE REVIEW AND ADVISE. Chino Sanches, [address] [facility] Desk Staff  Subject: Appointment Request     Reason for Call: New Patient/New to Provider Appointment needed: New   Patient Request Appointment     QUESTIONS     Reason for appointment request? No appointments available during search       Additional Information for Provider? Pt received the letter Dr. Simba Hedrick   is leaving, her  César Newton is a pt of Dr. David Fairchild and pt saw   him recently, requesting to establish with Dr. David Fairchild.    ---------------------------------------------------------------------------   --------------   Rc [employer]   [phone]; OK to leave message on [employer],OK to respond with electronic   message via [employer] portal (only for patients who have registered [employer]   account)   ---------------------------------------------------------------------------   --------------   SCRIPT ANSWERS

## 2023-09-15 ENCOUNTER — OFFICE VISIT (OUTPATIENT)
Dept: FAMILY MEDICINE CLINIC | Facility: CLINIC | Age: 68
End: 2023-09-15

## 2023-09-15 VITALS
HEART RATE: 66 BPM | OXYGEN SATURATION: 100 % | DIASTOLIC BLOOD PRESSURE: 78 MMHG | SYSTOLIC BLOOD PRESSURE: 125 MMHG | RESPIRATION RATE: 18 BRPM | TEMPERATURE: 97.4 F | WEIGHT: 121.6 LBS | HEIGHT: 65 IN | BODY MASS INDEX: 20.26 KG/M2

## 2023-09-15 DIAGNOSIS — R76.8 POSITIVE ANA (ANTINUCLEAR ANTIBODY): Primary | ICD-10-CM

## 2023-09-15 DIAGNOSIS — R73.03 PREDIABETES: ICD-10-CM

## 2023-09-15 DIAGNOSIS — M17.12 PRIMARY OSTEOARTHRITIS OF LEFT KNEE: ICD-10-CM

## 2023-09-15 DIAGNOSIS — E78.2 MIXED HYPERLIPIDEMIA: ICD-10-CM

## 2023-09-15 DIAGNOSIS — M17.11 PRIMARY OSTEOARTHRITIS OF RIGHT KNEE: ICD-10-CM

## 2023-09-15 RX ORDER — MINOXIDIL 2.5 MG/1
TABLET ORAL
COMMUNITY
Start: 2023-07-14 | End: 2023-09-15 | Stop reason: CLARIF

## 2023-09-15 RX ORDER — METHYLPREDNISOLONE ACETATE 80 MG/ML
80 INJECTION, SUSPENSION INTRA-ARTICULAR; INTRALESIONAL; INTRAMUSCULAR; SOFT TISSUE ONCE
Status: COMPLETED | OUTPATIENT
Start: 2023-09-15 | End: 2023-09-15

## 2023-09-15 RX ADMIN — METHYLPREDNISOLONE ACETATE 80 MG: 80 INJECTION, SUSPENSION INTRA-ARTICULAR; INTRALESIONAL; INTRAMUSCULAR; SOFT TISSUE at 10:13

## 2023-09-15 ASSESSMENT — ENCOUNTER SYMPTOMS
COUGH: 0
WHEEZING: 0
SHORTNESS OF BREATH: 0
BACK PAIN: 0
ABDOMINAL PAIN: 0

## 2023-09-15 NOTE — PROGRESS NOTES
HISTORY OF PRESENT ILLNESS  Maurisio Lara is a 75 y.o. female    HPI  Elevated FLORECITA:Pt was seen recently by her Dermatologist for hairloss, had labs done including CBC/TSH/CMP and FLORECITA, her FLORECITA titer was positive, need further evaluation, no rash, no photosensitivity, her labs were all ok except for the FLORECITA, labd reviewed today. HLD, ? Control, on diet only  Prediabetes, diet controlled, last A1c was 5.7  C/o B/L knees pain, right is worse, 8/10, worse when she gets up and walk, better with rest.    Review of Systems   Constitutional:  Negative for chills, diaphoresis and fever. Respiratory:  Negative for cough, shortness of breath and wheezing. Cardiovascular:  Negative for chest pain. Gastrointestinal:  Negative for abdominal pain. Musculoskeletal:  Negative for back pain, joint swelling and neck pain. Skin:  Negative for rash. Neurological:  Negative for dizziness. Physical Exam  Vitals reviewed. Cardiovascular:      Rate and Rhythm: Normal rate and regular rhythm. Heart sounds: No murmur heard. Pulmonary:      Effort: Pulmonary effort is normal.      Breath sounds: Normal breath sounds. No rales. Abdominal:      General: Bowel sounds are normal.      Palpations: Abdomen is soft. There is no hepatomegaly or splenomegaly. Tenderness: There is no abdominal tenderness. Musculoskeletal:      Right wrist: No swelling. Normal range of motion. Left wrist: No swelling. Normal range of motion. 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 ). Left hand: Normal range of motion (DJD changes in PIP/DIP joints. ). Right knee: Crepitus present. No swelling. Normal range of motion. No tenderness. Left knee: Crepitus present. No swelling. Normal range of motion. No tenderness. Right lower leg: No edema. Left lower leg: No edema. Skin:     Findings: No rash. Neurological:      Mental Status: She is oriented to person, place, and time.

## 2023-09-18 LAB
ANTI-DNA (DS) AB QN: <1 IU/ML
CENTROMERE B AB: <0.2 AI
CHROMATIN ANTIBODY: <0.2 AI
JO-1 ANTIBODY: <0.2 AI
RNP ANTIBODY: <0.2 AI
SCLERODERMA (SCL-70) AB: <0.2 AI
SJOGREN'S ANTI-SS-A: <0.2 AI
SJOGREN'S ANTI-SS-B: <0.2 AI
SMITH ABS: <0.2 AI

## 2023-09-19 RX ORDER — PRAVASTATIN SODIUM 10 MG
10 TABLET ORAL EVERY EVENING
Qty: 30 TABLET | Refills: 3 | Status: SHIPPED | OUTPATIENT
Start: 2023-09-19

## 2023-11-15 ENCOUNTER — OFFICE VISIT (OUTPATIENT)
Dept: FAMILY MEDICINE CLINIC | Facility: CLINIC | Age: 68
End: 2023-11-15
Payer: MEDICARE

## 2023-11-15 VITALS
HEART RATE: 75 BPM | RESPIRATION RATE: 18 BRPM | TEMPERATURE: 97.7 F | OXYGEN SATURATION: 96 % | SYSTOLIC BLOOD PRESSURE: 128 MMHG | WEIGHT: 122 LBS | BODY MASS INDEX: 20.83 KG/M2 | HEIGHT: 64 IN | DIASTOLIC BLOOD PRESSURE: 82 MMHG

## 2023-11-15 DIAGNOSIS — E78.2 MIXED HYPERLIPIDEMIA: ICD-10-CM

## 2023-11-15 DIAGNOSIS — Z00.00 MEDICARE ANNUAL WELLNESS VISIT, SUBSEQUENT: Primary | ICD-10-CM

## 2023-11-15 DIAGNOSIS — Z23 NEED FOR VACCINATION: ICD-10-CM

## 2023-11-15 DIAGNOSIS — Z12.11 COLON CANCER SCREENING: ICD-10-CM

## 2023-11-15 PROCEDURE — 90694 VACC AIIV4 NO PRSRV 0.5ML IM: CPT | Performed by: INTERNAL MEDICINE

## 2023-11-15 PROCEDURE — 1123F ACP DISCUSS/DSCN MKR DOCD: CPT | Performed by: INTERNAL MEDICINE

## 2023-11-15 PROCEDURE — G0009 ADMIN PNEUMOCOCCAL VACCINE: HCPCS | Performed by: INTERNAL MEDICINE

## 2023-11-15 PROCEDURE — 99213 OFFICE O/P EST LOW 20 MIN: CPT | Performed by: INTERNAL MEDICINE

## 2023-11-15 PROCEDURE — 90677 PCV20 VACCINE IM: CPT | Performed by: INTERNAL MEDICINE

## 2023-11-15 PROCEDURE — G0008 ADMIN INFLUENZA VIRUS VAC: HCPCS | Performed by: INTERNAL MEDICINE

## 2023-11-15 PROCEDURE — G0439 PPPS, SUBSEQ VISIT: HCPCS | Performed by: INTERNAL MEDICINE

## 2023-11-15 SDOH — HEALTH STABILITY: PHYSICAL HEALTH: ON AVERAGE, HOW MANY MINUTES DO YOU ENGAGE IN EXERCISE AT THIS LEVEL?: 90 MIN

## 2023-11-15 SDOH — HEALTH STABILITY: PHYSICAL HEALTH: ON AVERAGE, HOW MANY DAYS PER WEEK DO YOU ENGAGE IN MODERATE TO STRENUOUS EXERCISE (LIKE A BRISK WALK)?: 5 DAYS

## 2023-11-15 ASSESSMENT — ENCOUNTER SYMPTOMS: COUGH: 0

## 2023-11-15 ASSESSMENT — LIFESTYLE VARIABLES
HOW OFTEN DO YOU HAVE A DRINK CONTAINING ALCOHOL: 1
HOW OFTEN DO YOU HAVE A DRINK CONTAINING ALCOHOL: NEVER
HOW OFTEN DO YOU HAVE SIX OR MORE DRINKS ON ONE OCCASION: 1
HOW MANY STANDARD DRINKS CONTAINING ALCOHOL DO YOU HAVE ON A TYPICAL DAY: PATIENT DOES NOT DRINK
HOW MANY STANDARD DRINKS CONTAINING ALCOHOL DO YOU HAVE ON A TYPICAL DAY: 0

## 2023-11-15 ASSESSMENT — PATIENT HEALTH QUESTIONNAIRE - PHQ9
SUM OF ALL RESPONSES TO PHQ QUESTIONS 1-9: 0
1. LITTLE INTEREST OR PLEASURE IN DOING THINGS: 0
SUM OF ALL RESPONSES TO PHQ QUESTIONS 1-9: 0
SUM OF ALL RESPONSES TO PHQ9 QUESTIONS 1 & 2: 0
SUM OF ALL RESPONSES TO PHQ QUESTIONS 1-9: 0
SUM OF ALL RESPONSES TO PHQ QUESTIONS 1-9: 0
2. FEELING DOWN, DEPRESSED OR HOPELESS: 0

## 2023-11-15 NOTE — PROGRESS NOTES
HISTORY OF PRESENT ILLNESS  Francesca Blas is a 70 y.o. female    HPI  HLD, improving, she is tolerating pravachol well, no myalgia    Review of Systems   Constitutional:  Negative for fever. Respiratory:  Negative for cough. Cardiovascular:  Negative for chest pain. Musculoskeletal:  Negative for myalgias. Physical Exam  Vitals reviewed. Cardiovascular:      Rate and Rhythm: Normal rate and regular rhythm. Heart sounds: No murmur heard. Pulmonary:      Effort: Pulmonary effort is normal.      Breath sounds: Normal breath sounds. Neurological:      Mental Status: She is oriented to person, place, and time. ASSESSMENT and PLAN    1. Medicare annual wellness visit, subsequent  2. Colon cancer screening  -     External Referral To Gastroenterology  3. Need for vaccination  -     Pneumococcal, PCV20, PREVNAR 21, (age 6w+), IM, PF  -     Influenza, FLUAD, (age 72 y+), IM, Preservative Free, 0.5 mL  4. Mixed hyperlipidemia, improving, continue pravachol 10 mg daily, will check:  -     Lipid Panel; Future  -     ALT; Future         Return in about 3 months (around 2/15/2024).

## 2023-11-15 NOTE — PATIENT INSTRUCTIONS
benefits include a comprehensive review of your medical history including lifestyle, illnesses that may run in your family, and various assessments and screenings as appropriate. After reviewing your medical record and screening and assessments performed today your provider may have ordered immunizations, labs, imaging, and/or referrals for you. A list of these orders (if applicable) as well as your Preventive Care list are included within your After Visit Summary for your review. Other Preventive Recommendations:    A preventive eye exam performed by an eye specialist is recommended every 1-2 years to screen for glaucoma; cataracts, macular degeneration, and other eye disorders. A preventive dental visit is recommended every 6 months. Try to get at least 150 minutes of exercise per week or 10,000 steps per day on a pedometer . Order or download the FREE \"Exercise & Physical Activity: Your Everyday Guide\" from [employer]. Call 0-705.878.9028 or search [employer] online. You need 2520-9624 mg of calcium and 3862-0054 IU of vitamin D per day. It is possible to meet your calcium requirement with diet alone, but a vitamin D supplement is usually necessary to meet this goal.  When exposed to the sun, use a sunscreen that protects against both UVA and UVB radiation with an SPF of 30 or greater. Reapply every 2 to 3 hours or after sweating, drying off with a towel, or swimming. Always wear a seat belt when traveling in a car. Always wear a helmet when riding a bicycle or motorcycle.

## 2023-11-16 ENCOUNTER — NURSE ONLY (OUTPATIENT)
Dept: FAMILY MEDICINE CLINIC | Facility: CLINIC | Age: 68
End: 2023-11-16

## 2023-11-16 DIAGNOSIS — E78.2 MIXED HYPERLIPIDEMIA: ICD-10-CM

## 2023-11-17 LAB
ALT SERPL-CCNC: 17 U/L (ref 5–40)
CHOLESTEROL/HDL RATIO: 2.1 (ref 0–5)
CHOLESTEROL: 196 MG/DL (ref 110–200)
HDLC SERPL-MCNC: 92 MG/DL
LDL CHOLESTEROL CALCULATED: 93 MG/DL (ref 50–99)
LDL/HDL RATIO: 1
NON-HDL CHOLESTEROL: 104 MG/DL
TRIGL SERPL-MCNC: 53 MG/DL (ref 40–149)
VLDLC SERPL CALC-MCNC: 11 MG/DL (ref 8–30)

## 2023-12-04 RX ORDER — PRAVASTATIN SODIUM 10 MG
10 TABLET ORAL EVERY EVENING
Qty: 90 TABLET | Refills: 1 | Status: SHIPPED | OUTPATIENT
Start: 2023-12-04

## 2023-12-04 NOTE — TELEPHONE ENCOUNTER
Angelica Navarro called for their medication refill.     Last Office visit:  11/15/2023    Last Filled: 9/19/2023; Qty 30 w/ 3 refills     Follow up visit:    Future Appointments   Date Time Provider [address] [place] [address]   3/7/2024  9:45 AM Marlene Cavanaugh MD [facility]

## 2024-01-10 ASSESSMENT — ENCOUNTER SYMPTOMS
SINUS PRESSURE: 1
SINUS PAIN: 1

## 2024-01-10 NOTE — PROGRESS NOTES
HISTORY OF PRESENT ILLNESS  Kalyn Smith  is a 68 y.o. y.o. female    She reports a suspected sinus infection.  She notes that her symptoms have been developing for nearly a week and now include facial pain and pressure, low-grade temperature elevation and headache.  She indicates that she has a history of recurrent sinus infections and that these symptoms are not typical for those episodes.        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    Hyperlipidemia     Menopause     Mitral valve prolapse 1980s    s/p cardiac workup; intermittent in nature    Palpitations     PVC (premature ventricular contraction)     S/P cardiac cath 05/16    Mid LAD 40-50% with myocardial bridging    Syncope     last time in 1990's       Past Surgical History:   Procedure Laterality Date    HEMORRHOID SURGERY  1980s    TUBAL LIGATION  1985       Family History   Problem Relation Age of Onset    Hypertension Brother     Asthma Brother     Hypertension Father     Cancer Father         Colon    Hypertension Mother     Glaucoma Mother     Glaucoma Father        Allergies   Allergen Reactions    Cefuroxime Axetil     Ciprofloxacin Diarrhea    Lansoprazole     Latanoprost     Tafluprost     Statins Myalgia     Myalgia & swelling; failed several different statins, zocor/crestor/lipitor.       Social History     Tobacco Use   Smoking Status Never   Smokeless Tobacco Never       Social History     Substance and Sexual Activity   Alcohol Use No       Immunization History   Administered Date(s) Administered    COVID-19, PFIZER PURPLE top, DILUTE for use, (age 12 y+), 30mcg/0.3mL 01/26/2021, 02/16/2021    Influenza Virus Vaccine 10/15/2015, 09/30/2018    Influenza, FLUAD, (age 65 y+), Adjuvanted, 0.5mL 09/09/2020, 10/19/2021, 11/15/2023    Influenza, FLUARIX, FLULAVAL, FLUZONE (age 6 mo+)

## 2024-01-11 ENCOUNTER — OFFICE VISIT (OUTPATIENT)
Dept: FAMILY MEDICINE CLINIC | Facility: CLINIC | Age: 69
End: 2024-01-11

## 2024-01-11 VITALS
TEMPERATURE: 97.7 F | RESPIRATION RATE: 16 BRPM | HEART RATE: 77 BPM | SYSTOLIC BLOOD PRESSURE: 130 MMHG | DIASTOLIC BLOOD PRESSURE: 70 MMHG | BODY MASS INDEX: 21 KG/M2 | WEIGHT: 123 LBS | HEIGHT: 64 IN | OXYGEN SATURATION: 97 %

## 2024-01-11 DIAGNOSIS — J32.9 RECURRENT SINUSITIS: Primary | ICD-10-CM

## 2024-01-11 PROCEDURE — 1123F ACP DISCUSS/DSCN MKR DOCD: CPT | Performed by: FAMILY MEDICINE

## 2024-01-11 PROCEDURE — 99213 OFFICE O/P EST LOW 20 MIN: CPT | Performed by: FAMILY MEDICINE

## 2024-01-11 RX ORDER — AZITHROMYCIN 250 MG/1
250 TABLET, FILM COATED ORAL SEE ADMIN INSTRUCTIONS
Qty: 6 TABLET | Refills: 0 | Status: SHIPPED | OUTPATIENT
Start: 2024-01-11 | End: 2024-01-16

## 2024-01-11 ASSESSMENT — ENCOUNTER SYMPTOMS
WHEEZING: 0
SHORTNESS OF BREATH: 0
SORE THROAT: 1
COUGH: 1

## 2024-01-11 ASSESSMENT — PATIENT HEALTH QUESTIONNAIRE - PHQ9
2. FEELING DOWN, DEPRESSED OR HOPELESS: 0
SUM OF ALL RESPONSES TO PHQ9 QUESTIONS 1 & 2: 0
SUM OF ALL RESPONSES TO PHQ QUESTIONS 1-9: 0
1. LITTLE INTEREST OR PLEASURE IN DOING THINGS: 0
SUM OF ALL RESPONSES TO PHQ QUESTIONS 1-9: 0

## 2024-01-11 NOTE — PATIENT INSTRUCTIONS
Current Status:  Symptoms and exam consistent with sinusitis, reportedly recurrent-patient reports best previous response to azithromycin    Plan:  Azithromycin 250 mg, 2 tablets today then 1 tablet daily x 4 more days  Over-the-counter analgesics, antihistamines decongestants as needed  Follow-up for new symptoms, worsening symptoms or failure to improve

## 2024-01-11 NOTE — PROGRESS NOTES
Kalyn Smith is a 68 y.o. female (: 1955) presenting to address:    Chief Complaint   Patient presents with    Sinus Problem     For a little less then a week . Temp 99 this am .        Vitals:    24 1043   BP: 130/70   Pulse: 77   Resp: 16   Temp: 97.7 °F (36.5 °C)   SpO2: 97%       Coordination of Care Questionaire:   1. \"Have you been to the ER, urgent care clinic since your last visit?  Hospitalized since your last visit?\" No    2. \"Have you seen or consulted any other health care providers outside of the [facility] since your last visit?\" No     3. For patients aged 45-75: Has the patient had a colonoscopy / FIT/ Cologuard? Yes - no Care Gap present      If the patient is female:    4. For patients aged 40-74: Has the patient had a mammogram within the past 2 years? NA - based on age or sex      5. For patients aged 21-65: Has the patient had a pap smear? NA - based on age or sex    Advanced Directive:   1. Do you have an Advanced Directive? Yes    2. Would you like information on Advanced Directives? No

## 2024-02-02 ENCOUNTER — OFFICE VISIT (OUTPATIENT)
Dept: FAMILY MEDICINE CLINIC | Facility: CLINIC | Age: 69
End: 2024-02-02
Payer: MEDICARE

## 2024-02-02 VITALS
WEIGHT: 122.8 LBS | OXYGEN SATURATION: 97 % | HEART RATE: 80 BPM | TEMPERATURE: 97.5 F | DIASTOLIC BLOOD PRESSURE: 80 MMHG | BODY MASS INDEX: 20.96 KG/M2 | RESPIRATION RATE: 18 BRPM | SYSTOLIC BLOOD PRESSURE: 140 MMHG | HEIGHT: 64 IN

## 2024-02-02 DIAGNOSIS — J32.9 RECURRENT SINUSITIS: Primary | ICD-10-CM

## 2024-02-02 PROCEDURE — 99213 OFFICE O/P EST LOW 20 MIN: CPT | Performed by: INTERNAL MEDICINE

## 2024-02-02 PROCEDURE — 1123F ACP DISCUSS/DSCN MKR DOCD: CPT | Performed by: INTERNAL MEDICINE

## 2024-02-02 RX ORDER — DOXYCYCLINE HYCLATE 100 MG/1
100 CAPSULE ORAL 2 TIMES DAILY
Qty: 20 CAPSULE | Refills: 0 | Status: SHIPPED | OUTPATIENT
Start: 2024-02-02 | End: 2024-02-12

## 2024-02-02 RX ORDER — AZELASTINE 1 MG/ML
2 SPRAY, METERED NASAL 2 TIMES DAILY
Qty: 1 EACH | Refills: 3 | Status: SHIPPED | OUTPATIENT
Start: 2024-02-02

## 2024-02-02 ASSESSMENT — ENCOUNTER SYMPTOMS
COUGH: 0
SHORTNESS OF BREATH: 0
WHEEZING: 0
SINUS PAIN: 1
SINUS PRESSURE: 1

## 2024-02-02 NOTE — PROGRESS NOTES
Kalyn Smith is a 68 y.o. female (: 1955) presenting to address:    Chief Complaint   Patient presents with    Sinus Problem       Vitals:    24 1359   BP: (!) 142/85   Pulse: 80   Resp: 18   Temp: 97.5 °F (36.4 °C)   SpO2: 97%       \"Have you been to the ER, urgent care clinic since your last visit?  Hospitalized since your last visit?\"    NO    “Have you seen or consulted any other health care providers outside of [facility] since your last visit?”    NO    “Have you had a colorectal cancer screening such as a colonoscopy/FIT/Cologuard?    NO

## 2024-02-02 NOTE — PROGRESS NOTES
HISTORY OF PRESENT ILLNESS  Kalyn Smith is a 68 y.o. female    HPI    C/o started 5 days ago with facial pain/pressure and postnasal drip, getting worse  No fever, no wheezing or sob  Review of Systems   Constitutional:  Negative for fever.   HENT:  Positive for sinus pressure and sinus pain. Negative for ear pain.    Respiratory:  Negative for cough, shortness of breath and wheezing.    Cardiovascular:  Negative for chest pain.         Physical Exam  Vitals reviewed.   HENT:      Right Ear: Tympanic membrane normal.      Left Ear: Tympanic membrane normal.      Nose: Congestion present.      Right Sinus: Maxillary sinus tenderness present.      Left Sinus: Maxillary sinus tenderness present.      Mouth/Throat:      Pharynx: Posterior oropharyngeal erythema present. No oropharyngeal exudate.   Cardiovascular:      Rate and Rhythm: Normal rate and regular rhythm.      Heart sounds: No murmur heard.  Pulmonary:      Effort: Pulmonary effort is normal.      Breath sounds: Normal breath sounds. No wheezing.   Lymphadenopathy:      Cervical: No cervical adenopathy.          ASSESSMENT and PLAN    1. Recurrent sinusitis  -     doxycycline hyclate (VIBRAMYCIN) 100 MG capsule; Take 1 capsule by mouth 2 times daily for 10 days, Disp-20 capsule, R-0Normal  -     azelastine (ASTELIN) 0.1 % nasal spray; 2 sprays by Nasal route 2 times daily Use in each nostril as directed, Disp-1 each, R-3Normal         Return if symptoms worsen or fail to improve.

## 2024-04-11 ENCOUNTER — OFFICE VISIT (OUTPATIENT)
Dept: FAMILY MEDICINE CLINIC | Facility: CLINIC | Age: 69
End: 2024-04-11
Payer: MEDICARE

## 2024-04-11 VITALS
DIASTOLIC BLOOD PRESSURE: 91 MMHG | HEART RATE: 75 BPM | OXYGEN SATURATION: 96 % | HEIGHT: 64 IN | WEIGHT: 123.6 LBS | TEMPERATURE: 97.5 F | SYSTOLIC BLOOD PRESSURE: 150 MMHG | RESPIRATION RATE: 19 BRPM | BODY MASS INDEX: 21.1 KG/M2

## 2024-04-11 DIAGNOSIS — J32.0 CHRONIC MAXILLARY SINUSITIS: Primary | ICD-10-CM

## 2024-04-11 PROCEDURE — 1123F ACP DISCUSS/DSCN MKR DOCD: CPT | Performed by: INTERNAL MEDICINE

## 2024-04-11 PROCEDURE — 99213 OFFICE O/P EST LOW 20 MIN: CPT | Performed by: INTERNAL MEDICINE

## 2024-04-11 RX ORDER — AMOXICILLIN AND CLAVULANATE POTASSIUM 875; 125 MG/1; MG/1
1 TABLET, FILM COATED ORAL 2 TIMES DAILY
Qty: 20 TABLET | Refills: 0 | Status: SHIPPED | OUTPATIENT
Start: 2024-04-11 | End: 2024-04-21

## 2024-04-11 ASSESSMENT — ENCOUNTER SYMPTOMS
COUGH: 1
SHORTNESS OF BREATH: 0
WHEEZING: 0

## 2024-04-11 NOTE — PROGRESS NOTES
Kalyn Smith is a 68 y.o. female (: 1955) presenting to address:    Chief Complaint   Patient presents with    Sinus Problem       Vitals:    24 1002   BP: (!) 150/91   Pulse: 75   Resp: 19   Temp: 97.5 °F (36.4 °C)   SpO2: 96%       \"Have you been to the ER, urgent care clinic since your last visit?  Hospitalized since your last visit?\"    NO    “Have you seen or consulted any other health care providers outside of [facility] since your last visit?”    NO    “Have you had a colorectal cancer screening such as a colonoscopy/FIT/Cologuard?    NO    Date of last Colonoscopy: 10/4/2018  No cologuard on file  No FIT/FOBT on file   No flexible sigmoidoscopy on file               symmetrical

## 2024-04-11 NOTE — PROGRESS NOTES
HISTORY OF PRESENT ILLNESS  Kalyn Smith is a 68 y.o. female    HPI  C/o facial pain/congestion/pressure, started 3 weeks ago, she does have appointment with ENT next week for follow-up, she recently had a CT scan of the sinuses that showed chronic sinusitis.    Review of Systems   Constitutional:  Negative for chills and fever.   HENT:  Negative for ear pain.    Respiratory:  Positive for cough. Negative for shortness of breath and wheezing.    Cardiovascular:  Negative for chest pain.         Physical Exam  Vitals reviewed.   HENT:      Right Ear: Tympanic membrane normal.      Left Ear: Tympanic membrane normal.      Nose:      Right Sinus: Maxillary sinus tenderness present.      Left Sinus: Maxillary sinus tenderness present.      Mouth/Throat:      Pharynx: No oropharyngeal exudate.   Cardiovascular:      Rate and Rhythm: Normal rate and regular rhythm.   Pulmonary:      Effort: Pulmonary effort is normal.      Breath sounds: Normal breath sounds.   Lymphadenopathy:      Cervical: No cervical adenopathy.          ASSESSMENT and PLAN    1. Chronic maxillary sinusitis  -     amoxicillin-clavulanate (AUGMENTIN) 875-125 MG per tablet; Take 1 tablet by mouth 2 times daily for 10 days, Disp-20 tablet, R-0Normal  Benadryl OTC prn for nasal congestion         No follow-ups on file.

## 2024-05-10 RX ORDER — ALBUTEROL SULFATE 90 UG/1
2 AEROSOL, METERED RESPIRATORY (INHALATION) EVERY 6 HOURS PRN
Qty: 18 G | Refills: 3 | Status: SHIPPED | OUTPATIENT
Start: 2024-05-10

## 2024-05-24 RX ORDER — PRAVASTATIN SODIUM 10 MG
10 TABLET ORAL EVERY EVENING
Qty: 90 TABLET | Refills: 1 | Status: SHIPPED | OUTPATIENT
Start: 2024-05-24

## 2024-07-26 ENCOUNTER — OFFICE VISIT (OUTPATIENT)
Dept: FAMILY MEDICINE CLINIC | Facility: CLINIC | Age: 69
End: 2024-07-26
Payer: MEDICARE

## 2024-07-26 VITALS
HEIGHT: 64 IN | RESPIRATION RATE: 19 BRPM | TEMPERATURE: 97.8 F | OXYGEN SATURATION: 100 % | BODY MASS INDEX: 20.86 KG/M2 | SYSTOLIC BLOOD PRESSURE: 159 MMHG | DIASTOLIC BLOOD PRESSURE: 83 MMHG | HEART RATE: 87 BPM | WEIGHT: 122.2 LBS

## 2024-07-26 DIAGNOSIS — R50.9 FEVER, UNSPECIFIED FEVER CAUSE: Primary | ICD-10-CM

## 2024-07-26 LAB
EXP DATE SOLUTION: NORMAL
EXP DATE SWAB: NORMAL
EXPIRATION DATE: NORMAL
LOT NUMBER POC: NORMAL
LOT NUMBER SOLUTION: NORMAL
LOT NUMBER SWAB: NORMAL
SARS-COV-2 RNA, POC: NEGATIVE

## 2024-07-26 PROCEDURE — 99213 OFFICE O/P EST LOW 20 MIN: CPT | Performed by: INTERNAL MEDICINE

## 2024-07-26 PROCEDURE — 1123F ACP DISCUSS/DSCN MKR DOCD: CPT | Performed by: INTERNAL MEDICINE

## 2024-07-26 PROCEDURE — 87635 SARS-COV-2 COVID-19 AMP PRB: CPT | Performed by: INTERNAL MEDICINE

## 2024-07-26 RX ORDER — LEVOFLOXACIN 500 MG/1
500 TABLET, FILM COATED ORAL DAILY
Qty: 7 TABLET | Refills: 0 | Status: SHIPPED | OUTPATIENT
Start: 2024-07-26 | End: 2024-08-02

## 2024-07-26 ASSESSMENT — ENCOUNTER SYMPTOMS
SHORTNESS OF BREATH: 0
SINUS PAIN: 0
WHEEZING: 0
ABDOMINAL PAIN: 0
SORE THROAT: 0

## 2024-07-26 NOTE — PROGRESS NOTES
HISTORY OF PRESENT ILLNESS  Kalyn Smith is a 68 y.o. female    HPI  Pt is in today c/o fever of 102, started 2 days ago, associated with chills, no S/T, no ears pain, no cough/wheezing or sob.  No nausea or vomiting, no abdominal pain  No dysuria    Review of Systems   Constitutional:  Negative for fatigue.   HENT:  Negative for ear pain, sinus pain and sore throat.    Respiratory:  Negative for shortness of breath and wheezing.    Cardiovascular:  Negative for chest pain.   Gastrointestinal:  Negative for abdominal pain.   Genitourinary:  Negative for dysuria and flank pain.   Neurological:  Negative for headaches.         Physical Exam  Vitals reviewed.   HENT:      Right Ear: Tympanic membrane normal.      Left Ear: Tympanic membrane normal.      Mouth/Throat:      Pharynx: No oropharyngeal exudate or posterior oropharyngeal erythema.   Cardiovascular:      Rate and Rhythm: Normal rate and regular rhythm.      Heart sounds: No murmur heard.  Pulmonary:      Effort: Pulmonary effort is normal.      Breath sounds: Normal breath sounds. No wheezing.   Abdominal:      Palpations: Abdomen is soft.      Tenderness: There is no abdominal tenderness.        ASSESSMENT and PLAN    1. Fever, unspecified fever cause, ? etiology  -     AMB POC COVID-19 COV  -     levoFLOXacin (LEVAQUIN) 500 MG tablet; Take 1 tablet by mouth daily for 7 days, Disp-7 tablet, R-0Normal     Results for orders placed or performed in visit on 07/26/24   AMB POC COVID-19 COV   Result Value Ref Range    SARS-COV-2 RNA, POC Negative     Lot number swab      EXP date swab      Lot number solution      EXP date solution      LOT NUMBER POC      EXPIRATION DATE           No follow-ups on file.

## 2024-07-26 NOTE — PROGRESS NOTES
Kalyn Smith is a 68 y.o. female (: 1955) presenting to address:    Chief Complaint   Patient presents with    Fever       Vitals:    24 1543   BP: (!) 159/83   Pulse: 87   Resp: 19   Temp: 97.8 °F (36.6 °C)   SpO2: 100%       \"Have you been to the ER, urgent care clinic since your last visit?  Hospitalized since your last visit?\"    NO    “Have you seen or consulted any other health care providers outside of [facility] since your last visit?”    NO    “Have you had a colorectal cancer screening such as a colonoscopy/FIT/Cologuard?    NO    Date of last Colonoscopy: 10/4/2018  No cologuard on file  No FIT/FOBT on file   No flexible sigmoidoscopy on file

## 2024-08-02 ENCOUNTER — OFFICE VISIT (OUTPATIENT)
Dept: FAMILY MEDICINE CLINIC | Facility: CLINIC | Age: 69
End: 2024-08-02
Payer: MEDICARE

## 2024-08-02 VITALS
RESPIRATION RATE: 19 BRPM | HEIGHT: 64 IN | OXYGEN SATURATION: 97 % | BODY MASS INDEX: 20.66 KG/M2 | SYSTOLIC BLOOD PRESSURE: 122 MMHG | WEIGHT: 121 LBS | DIASTOLIC BLOOD PRESSURE: 79 MMHG | TEMPERATURE: 97.3 F | HEART RATE: 77 BPM

## 2024-08-02 DIAGNOSIS — Z01.818 PREOP EXAMINATION: Primary | ICD-10-CM

## 2024-08-02 DIAGNOSIS — J34.89 NASAL VESTIBULITIS: ICD-10-CM

## 2024-08-02 DIAGNOSIS — E78.2 MIXED HYPERLIPIDEMIA: ICD-10-CM

## 2024-08-02 DIAGNOSIS — R73.03 PREDIABETES: ICD-10-CM

## 2024-08-02 DIAGNOSIS — J32.8 OTHER CHRONIC SINUSITIS: ICD-10-CM

## 2024-08-02 PROCEDURE — 99214 OFFICE O/P EST MOD 30 MIN: CPT | Performed by: INTERNAL MEDICINE

## 2024-08-02 PROCEDURE — 1123F ACP DISCUSS/DSCN MKR DOCD: CPT | Performed by: INTERNAL MEDICINE

## 2024-08-02 RX ORDER — LORATADINE 10 MG/1
10 TABLET ORAL DAILY
COMMUNITY

## 2024-08-02 ASSESSMENT — ENCOUNTER SYMPTOMS
SORE THROAT: 0
COUGH: 0
ABDOMINAL PAIN: 0
WHEEZING: 0
SHORTNESS OF BREATH: 0

## 2024-08-02 NOTE — PROGRESS NOTES
HISTORY OF PRESENT ILLNESS  Kalyn Smith is a 68 y.o. female    HPI  Patient is in today for preop, she will have sinus surgery for her nasal vestibulitis and the chronic sinusitis in 3 weeks.  Hyperlipidemia, stable, her cholesterol is controlled, on Pravachol 10 mg daily.  Prediabetes, stable, her last A1c was 5.7.  Her fever improved since recent visit, she completed antibiotic therapy, she feels much better, no fever or chills    Review of Systems   HENT:  Negative for ear pain and sore throat.    Respiratory:  Negative for cough, shortness of breath and wheezing.    Cardiovascular:  Negative for chest pain.   Gastrointestinal:  Negative for abdominal pain.   Genitourinary:  Negative for dysuria.   Neurological:  Negative for headaches.         Physical Exam  Vitals reviewed.   HENT:      Right Ear: Tympanic membrane normal.      Left Ear: Tympanic membrane normal.      Mouth/Throat:      Pharynx: No oropharyngeal exudate or posterior oropharyngeal erythema.   Cardiovascular:      Rate and Rhythm: Normal rate and regular rhythm.      Heart sounds: No murmur heard.  Pulmonary:      Effort: Pulmonary effort is normal.      Breath sounds: Normal breath sounds. No wheezing.   Abdominal:      Palpations: Abdomen is soft.      Tenderness: There is no abdominal tenderness.          ASSESSMENT and PLAN    1. Preop examination, stable for surgery  2. Nasal vestibulitis  3. Other chronic sinusitis  4. Mixed hyperlipidemia, controlled, continue Pravachol 10 mg nightly, she may take it the night before surgery  -     CBC; Future  -     Comprehensive Metabolic Panel; Future  -     Lipid Panel; Future  5. Prediabetes, stable, continue with diet  -     CBC; Future  -     Comprehensive Metabolic Panel; Future  -     Lipid Panel; Future  -     Hemoglobin A1C; Future    -Advised patient not to use any NSAIDs like ibuprofen/aspirin/Aleve 1 week before surgery         Return in about 17 days (around 8/19/2024) for Medicare

## 2024-08-02 NOTE — PATIENT INSTRUCTIONS
Please do not take any aspirin/Aleve/ibuprofen 1 week before surgery, you may take Tylenol if needed.  You can take your pravastatin the night before surgery

## 2024-08-02 NOTE — PROGRESS NOTES
Kalyn Smith is a 68 y.o. female (: 1955) presenting to address:    Chief Complaint   Patient presents with    Pre-op Exam       Vitals:    24 0742   BP: 122/79   Pulse: 77   Resp: 19   Temp: 97.3 °F (36.3 °C)   SpO2: 97%       \"Have you been to the ER, urgent care clinic since your last visit?  Hospitalized since your last visit?\"    NO    “Have you seen or consulted any other health care providers outside of [facility] since your last visit?”    NO    “Have you had a colorectal cancer screening such as a colonoscopy/FIT/Cologuard?    NO    Date of last Colonoscopy: 10/4/2018  No cologuard on file  No FIT/FOBT on file   No flexible sigmoidoscopy on file

## 2024-08-07 RX ORDER — PRAVASTATIN SODIUM 20 MG
20 TABLET ORAL DAILY
Qty: 90 TABLET | Refills: 1 | Status: SHIPPED | OUTPATIENT
Start: 2024-08-07

## 2024-08-17 SDOH — HEALTH STABILITY: PHYSICAL HEALTH: ON AVERAGE, HOW MANY MINUTES DO YOU ENGAGE IN EXERCISE AT THIS LEVEL?: 60 MIN

## 2024-08-17 SDOH — HEALTH STABILITY: PHYSICAL HEALTH: ON AVERAGE, HOW MANY DAYS PER WEEK DO YOU ENGAGE IN MODERATE TO STRENUOUS EXERCISE (LIKE A BRISK WALK)?: 6 DAYS

## 2024-08-17 ASSESSMENT — PATIENT HEALTH QUESTIONNAIRE - PHQ9
2. FEELING DOWN, DEPRESSED OR HOPELESS: NOT AT ALL
SUM OF ALL RESPONSES TO PHQ QUESTIONS 1-9: 0
SUM OF ALL RESPONSES TO PHQ QUESTIONS 1-9: 0
SUM OF ALL RESPONSES TO PHQ9 QUESTIONS 1 & 2: 0
SUM OF ALL RESPONSES TO PHQ QUESTIONS 1-9: 0
1. LITTLE INTEREST OR PLEASURE IN DOING THINGS: NOT AT ALL
SUM OF ALL RESPONSES TO PHQ QUESTIONS 1-9: 0

## 2024-08-17 ASSESSMENT — LIFESTYLE VARIABLES
HOW OFTEN DO YOU HAVE A DRINK CONTAINING ALCOHOL: 1
HOW MANY STANDARD DRINKS CONTAINING ALCOHOL DO YOU HAVE ON A TYPICAL DAY: PATIENT DOES NOT DRINK
HOW MANY STANDARD DRINKS CONTAINING ALCOHOL DO YOU HAVE ON A TYPICAL DAY: 0
HOW OFTEN DO YOU HAVE A DRINK CONTAINING ALCOHOL: NEVER
HOW OFTEN DO YOU HAVE SIX OR MORE DRINKS ON ONE OCCASION: 1

## 2024-08-19 ENCOUNTER — OFFICE VISIT (OUTPATIENT)
Dept: FAMILY MEDICINE CLINIC | Facility: CLINIC | Age: 69
End: 2024-08-19
Payer: MEDICARE

## 2024-08-19 VITALS
RESPIRATION RATE: 16 BRPM | BODY MASS INDEX: 21 KG/M2 | HEART RATE: 81 BPM | DIASTOLIC BLOOD PRESSURE: 78 MMHG | HEIGHT: 64 IN | WEIGHT: 123 LBS | OXYGEN SATURATION: 100 % | TEMPERATURE: 97.4 F | SYSTOLIC BLOOD PRESSURE: 124 MMHG

## 2024-08-19 DIAGNOSIS — E78.2 MIXED HYPERLIPIDEMIA: ICD-10-CM

## 2024-08-19 DIAGNOSIS — Z00.00 MEDICARE ANNUAL WELLNESS VISIT, SUBSEQUENT: Primary | ICD-10-CM

## 2024-08-19 PROCEDURE — G0439 PPPS, SUBSEQ VISIT: HCPCS | Performed by: INTERNAL MEDICINE

## 2024-08-19 PROCEDURE — 1123F ACP DISCUSS/DSCN MKR DOCD: CPT | Performed by: INTERNAL MEDICINE

## 2024-08-19 NOTE — PROGRESS NOTES
Medicare Annual Wellness Visit    Kalyn Smith is here for Medicare AWV    Assessment & Plan   Medicare annual wellness visit, subsequent  Mixed hyperlipidemia, we recently increased her Pravachol dose, we will check labs in 3 months.  -     ALT; Future  -     Lipid Panel; Future    -Patient has colonoscopy scheduled next month.  -Patient will have sinus surgery in 3 days.  Advised to get her flu vaccine soon    Recommendations for Preventive Services Due: see orders and patient instructions/AVS.  Recommended screening schedule for the next 5-10 years is provided to the patient in written form: see Patient Instructions/AVS.     Return in about 6 months (around 2/19/2025).     Subjective       Patient's complete Health Risk Assessment and screening values have been reviewed and are found in Flowsheets. The following problems were reviewed today and where indicated follow up appointments were made and/or referrals ordered.    Positive Risk Factor Screenings with Interventions:                 Poor Eating Habits/Diet:  Do you eat balanced/healthy meals regularly?: (!) No  Interventions:  See AVS for additional education material     Dentist Screen:  Have you seen the dentist within the past year?: (!) No    Intervention:  Advised to schedule with their dentist      Safety:  Do you have non-slip mats or non-slip surfaces or shower bars or grab bars in your shower or bathtub?: (!) No  Interventions:  Pt was advised to install a shower bar.                   Objective   Vitals:    08/19/24 1251   BP: 124/78   Site: Left Upper Arm   Position: Sitting   Cuff Size: Small Adult   Pulse: 81   Resp: 16   Temp: 97.4 °F (36.3 °C)   TempSrc: Temporal   SpO2: 100%   Weight: 55.8 kg (123 lb)   Height: 1.626 m (5' 4\")      Body mass index is 21.11 kg/m².                  Allergies   Allergen Reactions    Cefuroxime Axetil     Ciprofloxacin Diarrhea    Lansoprazole     Latanoprost     Tafluprost     Statins Myalgia     Myalgia &

## 2024-08-19 NOTE — PROGRESS NOTES
Kalyn Smith is a 68 y.o. female (: 1955) presenting to address:    Chief Complaint   Patient presents with    Medicare AWV       Vitals:    24 1251   BP: 124/78   Pulse: 81   Resp: 16   Temp: 97.4 °F (36.3 °C)   SpO2: 100%       \"Have you been to the ER, urgent care clinic since your last visit?  Hospitalized since your last visit?\"    NO    “Have you seen or consulted any other health care providers outside of [facility] since your last visit?”    YES - When: approximately 2  weeks ago.  Where and Why: ENT for sinuses .    “Have you had a colorectal cancer screening such as a colonoscopy/FIT/Cologuard?    NOscheduled for 2024    Date of last Colonoscopy: 10/4/2018  No cologuard on file  No FIT/FOBT on file   No flexible sigmoidoscopy on file

## 2024-11-01 ENCOUNTER — HOSPITAL ENCOUNTER (OUTPATIENT)
Facility: HOSPITAL | Age: 69
Setting detail: SPECIMEN
Discharge: HOME | End: 2024-11-04

## 2024-11-01 LAB — SENTARA SPECIMEN COLLECTION: NORMAL

## 2024-11-01 PROCEDURE — 99001 SPECIMEN HANDLING PT-LAB: CPT

## 2024-11-02 LAB
ALT SERPL-CCNC: 15 U/L (ref 5–40)
CHOLESTEROL, TOTAL: 236 MG/DL (ref 110–200)
CHOLESTEROL/HDL RATIO: 2.9 (ref 0–5)
HDLC SERPL-MCNC: 82 MG/DL
LDL CHOLESTEROL: 139 MG/DL (ref 50–99)
LDL/HDL RATIO: 1.7
NON-HDL CHOLESTEROL: 154 MG/DL
TRIGL SERPL-MCNC: 72 MG/DL (ref 40–149)
VLDLC SERPL CALC-MCNC: 14 MG/DL (ref 8–30)

## 2024-11-13 ENCOUNTER — OFFICE VISIT (OUTPATIENT)
Dept: FAMILY MEDICINE CLINIC | Facility: CLINIC | Age: 69
End: 2024-11-13

## 2024-11-13 VITALS
RESPIRATION RATE: 16 BRPM | TEMPERATURE: 97.3 F | WEIGHT: 123 LBS | SYSTOLIC BLOOD PRESSURE: 130 MMHG | HEIGHT: 64 IN | HEART RATE: 68 BPM | OXYGEN SATURATION: 99 % | DIASTOLIC BLOOD PRESSURE: 78 MMHG | BODY MASS INDEX: 21 KG/M2

## 2024-11-13 DIAGNOSIS — E78.2 MIXED HYPERLIPIDEMIA: Primary | ICD-10-CM

## 2024-11-13 DIAGNOSIS — R73.03 PREDIABETES: ICD-10-CM

## 2024-11-13 RX ORDER — EZETIMIBE 10 MG/1
10 TABLET ORAL DAILY
Qty: 90 TABLET | Refills: 1 | Status: SHIPPED | OUTPATIENT
Start: 2024-11-13

## 2024-11-13 ASSESSMENT — ENCOUNTER SYMPTOMS
SHORTNESS OF BREATH: 0
WHEEZING: 0
ABDOMINAL PAIN: 0
COUGH: 0

## 2024-11-13 NOTE — PROGRESS NOTES
Kalyn Smith is a 69 y.o. female (: 1955) presenting to address:    Chief Complaint   Patient presents with    Medication Check       Vitals:    24 0851   BP: 130/78   Pulse: 68   Resp: 16   Temp: 97.3 °F (36.3 °C)   SpO2: 99%       \"Have you been to the ER, urgent care clinic since your last visit?  Hospitalized since your last visit?\"    NO    “Have you seen or consulted any other health care providers outside of [facility] since your last visit?”    NO

## 2024-11-13 NOTE — PROGRESS NOTES
HISTORY OF PRESENT ILLNESS  Kalyn Smith is a 69 y.o. female    HPI  Hyperlipidemia, worsening, she has been taking her Pravachol daily, her recent labs noted today, her LDL is 139.  Prediabetes, stable, diet controlled.    Review of Systems   Respiratory:  Negative for cough, shortness of breath and wheezing.    Cardiovascular:  Negative for chest pain.   Gastrointestinal:  Negative for abdominal pain.   Neurological:  Negative for headaches.         Physical Exam  Vitals reviewed.   Cardiovascular:      Rate and Rhythm: Normal rate and regular rhythm.      Heart sounds: No murmur heard.  Pulmonary:      Effort: Pulmonary effort is normal.      Breath sounds: Normal breath sounds. No wheezing.          ASSESSMENT and PLAN    1. Mixed hyperlipidemia, not controlled, will continue Pravachol daily and we will add Zetia 10 mg daily  -     ezetimibe (ZETIA) 10 MG tablet; Take 1 tablet by mouth daily, Disp-90 tablet, R-1Normal  -     CBC; Future  -     Comprehensive Metabolic Panel; Future  -     Lipid Panel; Future  2. Prediabetes, stable, continue diet  -     CBC; Future  -     Comprehensive Metabolic Panel; Future  -     Hemoglobin A1C; Future  -     Lipid Panel; Future    Hemoglobin A1C   Date Value Ref Range Status   08/02/2024 5.7 (H) 4.8 - 5.6 % Final         Results for orders placed or performed during the hospital encounter of 11/01/24 (from the past 2160 hour(s))   [facility] specimen collection   Result Value Ref Range    [facility] Specimen Collection Specimens collected/sent to [facility]     Results for orders placed or performed in visit on 11/01/24 (from the past 2160 hour(s))   Lipid Panel   Result Value Ref Range    Cholesterol, Total 236 (H) 110 - 200 mg/dL    Triglycerides 72 40 - 149 mg/dL    HDL 82 >=40 mg/dL    Chol/HDL Ratio 2.9 0.0 - 5.0    Non-HDL Cholesterol 154 (H) <130 mg/dL    LDL Cholesterol 139 (H) 50 - 99 mg/dL    VLDL Cholesterol Calculated 14 8 - 30 mg/dL    LDL/HDL Ratio 1.7    ALT   Result

## 2024-11-18 ENCOUNTER — PATIENT MESSAGE (OUTPATIENT)
Dept: FAMILY MEDICINE CLINIC | Facility: CLINIC | Age: 69
End: 2024-11-18

## 2025-01-27 RX ORDER — PRAVASTATIN SODIUM 20 MG
20 TABLET ORAL DAILY
Qty: 90 TABLET | Refills: 1 | Status: SHIPPED | OUTPATIENT
Start: 2025-01-27

## 2025-02-07 ENCOUNTER — HOSPITAL ENCOUNTER (OUTPATIENT)
Facility: HOSPITAL | Age: 70
Setting detail: SPECIMEN
Discharge: HOME | End: 2025-02-10

## 2025-02-07 LAB
ESTIMATED AVERAGE GLUCOSE: 119 MG/DL (ref 91–123)
HBA1C MFR BLD: 5.8 % (ref 4.8–5.6)
HCT VFR BLD CALC: 38.7 % (ref 35.1–48.3)
HEMOGLOBIN: 12.3 G/DL (ref 11.7–16.1)
MCH RBC QN AUTO: 30 PG (ref 26–34)
MCHC RBC AUTO-ENTMCNC: 32 G/DL (ref 31–36)
MCV RBC AUTO: 93 FL (ref 80–99)
PDW BLD-RTO: 14.5 % (ref 10–15.5)
PLATELET # BLD: 181 K/UL (ref 140–440)
PMV BLD AUTO: 10.3 FL (ref 9–13)
RBC # BLD: 4.15 M/UL (ref 3.8–5.2)
SENTARA SPECIMEN COLLECTION: NORMAL
WBC # BLD: 3.4 K/UL (ref 4–11)

## 2025-02-07 PROCEDURE — 99001 SPECIMEN HANDLING PT-LAB: CPT

## 2025-02-08 LAB
A/G RATIO: 1.9 RATIO (ref 1.1–2.6)
ALBUMIN: 4.2 G/DL (ref 3.5–5)
ALP BLD-CCNC: 54 U/L (ref 40–120)
ALT SERPL-CCNC: 20 U/L (ref 5–40)
ANION GAP SERPL CALCULATED.3IONS-SCNC: 14 MMOL/L (ref 3–15)
AST SERPL-CCNC: 25 U/L (ref 10–37)
BILIRUB SERPL-MCNC: 0.5 MG/DL (ref 0.2–1.2)
BUN BLDV-MCNC: 16 MG/DL (ref 6–22)
CALCIUM SERPL-MCNC: 9.7 MG/DL (ref 8.4–10.5)
CHLORIDE BLD-SCNC: 105 MMOL/L (ref 98–110)
CHOLESTEROL, TOTAL: 199 MG/DL (ref 110–200)
CHOLESTEROL/HDL RATIO: 2.7 (ref 0–5)
CO2: 25 MMOL/L (ref 20–32)
CREAT SERPL-MCNC: 0.6 MG/DL (ref 0.8–1.4)
GFR, ESTIMATED: >60 ML/MIN/1.73 SQ.M.
GLOBULIN: 2.2 G/DL (ref 2–4)
GLUCOSE: 85 MG/DL (ref 70–99)
HDLC SERPL-MCNC: 75 MG/DL
LDL CHOLESTEROL: 111 MG/DL (ref 50–99)
LDL/HDL RATIO: 1.5
NON-HDL CHOLESTEROL: 124 MG/DL
POTASSIUM SERPL-SCNC: 4.4 MMOL/L (ref 3.5–5.5)
SODIUM BLD-SCNC: 144 MMOL/L (ref 133–145)
TOTAL PROTEIN: 6.4 G/DL (ref 6.2–8.1)
TRIGL SERPL-MCNC: 62 MG/DL (ref 40–149)
VLDLC SERPL CALC-MCNC: 12 MG/DL (ref 8–30)

## 2025-02-11 SDOH — ECONOMIC STABILITY: FOOD INSECURITY: WITHIN THE PAST 12 MONTHS, YOU WORRIED THAT YOUR FOOD WOULD RUN OUT BEFORE YOU GOT MONEY TO BUY MORE.: NEVER TRUE

## 2025-02-11 SDOH — ECONOMIC STABILITY: FOOD INSECURITY: WITHIN THE PAST 12 MONTHS, THE FOOD YOU BOUGHT JUST DIDN'T LAST AND YOU DIDN'T HAVE MONEY TO GET MORE.: NEVER TRUE

## 2025-02-11 SDOH — ECONOMIC STABILITY: INCOME INSECURITY: IN THE LAST 12 MONTHS, WAS THERE A TIME WHEN YOU WERE NOT ABLE TO PAY THE MORTGAGE OR RENT ON TIME?: NO

## 2025-02-11 SDOH — ECONOMIC STABILITY: TRANSPORTATION INSECURITY
IN THE PAST 12 MONTHS, HAS THE LACK OF TRANSPORTATION KEPT YOU FROM MEDICAL APPOINTMENTS OR FROM GETTING MEDICATIONS?: NO

## 2025-02-14 ENCOUNTER — OFFICE VISIT (OUTPATIENT)
Dept: FAMILY MEDICINE CLINIC | Facility: CLINIC | Age: 70
End: 2025-02-14
Payer: MEDICARE

## 2025-02-14 VITALS
SYSTOLIC BLOOD PRESSURE: 113 MMHG | HEIGHT: 64 IN | DIASTOLIC BLOOD PRESSURE: 75 MMHG | BODY MASS INDEX: 20.83 KG/M2 | TEMPERATURE: 97.2 F | WEIGHT: 122 LBS | RESPIRATION RATE: 16 BRPM | HEART RATE: 83 BPM | OXYGEN SATURATION: 100 %

## 2025-02-14 DIAGNOSIS — E78.2 MIXED HYPERLIPIDEMIA: Primary | ICD-10-CM

## 2025-02-14 DIAGNOSIS — Z12.31 ENCOUNTER FOR SCREENING MAMMOGRAM FOR BREAST CANCER: ICD-10-CM

## 2025-02-14 DIAGNOSIS — R73.03 PREDIABETES: ICD-10-CM

## 2025-02-14 PROCEDURE — 1123F ACP DISCUSS/DSCN MKR DOCD: CPT | Performed by: INTERNAL MEDICINE

## 2025-02-14 PROCEDURE — 1160F RVW MEDS BY RX/DR IN RCRD: CPT | Performed by: INTERNAL MEDICINE

## 2025-02-14 PROCEDURE — 1159F MED LIST DOCD IN RCRD: CPT | Performed by: INTERNAL MEDICINE

## 2025-02-14 PROCEDURE — 99214 OFFICE O/P EST MOD 30 MIN: CPT | Performed by: INTERNAL MEDICINE

## 2025-02-14 PROCEDURE — 1126F AMNT PAIN NOTED NONE PRSNT: CPT | Performed by: INTERNAL MEDICINE

## 2025-02-14 RX ORDER — PRAVASTATIN SODIUM 40 MG
40 TABLET ORAL NIGHTLY
Qty: 90 TABLET | Refills: 1 | Status: SHIPPED | OUTPATIENT
Start: 2025-02-14

## 2025-02-14 ASSESSMENT — ENCOUNTER SYMPTOMS
COUGH: 0
SHORTNESS OF BREATH: 0

## 2025-02-14 NOTE — PROGRESS NOTES
HISTORY OF PRESENT ILLNESS  Kalyn Smith is a 69 y.o. female    HPI  Hyperlipidemia, not well-controlled, her recent labs discussed today, LDL was 111, she is tolerating Pravachol well but she could not tolerate Zetia due to side effects so she stopped it.  Prediabetes, stable, her recent A1c was 5.8.  Recent labs discussed today with patient.    Review of Systems   Respiratory:  Negative for cough and shortness of breath.    Cardiovascular:  Negative for chest pain.   Musculoskeletal:  Negative for myalgias.   Neurological:  Negative for headaches.         Physical Exam  Vitals reviewed.   Cardiovascular:      Rate and Rhythm: Normal rate and regular rhythm.      Heart sounds: No murmur heard.  Pulmonary:      Effort: Pulmonary effort is normal.      Breath sounds: Normal breath sounds. No wheezing.          ASSESSMENT and PLAN    1. Mixed hyperlipidemia, not well-controlled, we will increase Pravachol dose to 40 mg daily  -     pravastatin (PRAVACHOL) 40 MG tablet; Take 1 tablet by mouth at bedtime, Disp-90 tablet, R-1Normal  -     Lipid Panel; Future  -     Comprehensive Metabolic Panel; Future  2. Prediabetes, stable, continue diet  3. Encounter for screening mammogram for breast cancer  -     [facility] NARENDRA DIGITAL SCREEN BILATERAL [[id number]]; Future     Results for orders placed or performed during the hospital encounter of 02/07/25 (from the past 2160 hour(s))   [facility] specimen collection   Result Value Ref Range    [facility] Specimen Collection Specimens collected/sent to [facility]     Results for orders placed or performed in visit on 02/07/25 (from the past 2160 hour(s))   CBC   Result Value Ref Range    WBC 3.4 (L) 4.0 - 11.0 K/uL    RBC 4.15 3.80 - 5.20 M/uL    Hemoglobin 12.3 11.7 - 16.1 g/dL    Hematocrit 38.7 35.1 - 48.3 %    MCV 93 80 - 99 fL    MCH 30 26 - 34 pg    MCHC 32 31 - 36 g/dL    RDW 14.5 10.0 - 15.5 %    Platelets 181 140 - 440 K/uL    MPV 10.3 9.0 - 13.0 fL   Hemoglobin A1C   Result Value Ref

## 2025-02-14 NOTE — PROGRESS NOTES
Kalyn Smith is a 69 y.o. female (: 1955) presenting to address:    Chief Complaint   Patient presents with    Medication Check       Vitals:    25 0953   BP: 113/75   Pulse: 83   Resp: 16   Temp: 97.2 °F (36.2 °C)   SpO2: 100%       \"Have you been to the ER, urgent care clinic since your last visit?  Hospitalized since your last visit?\"    NO    “Have you seen or consulted any other health care providers outside of [facility] since your last visit?”    NO       Have you had a mammogram?”   NO    Date of last Mammogram: 2023

## 2025-02-28 ENCOUNTER — PATIENT MESSAGE (OUTPATIENT)
Dept: FAMILY MEDICINE CLINIC | Facility: CLINIC | Age: 70
End: 2025-02-28

## 2025-02-28 RX ORDER — PRAVASTATIN SODIUM 20 MG
20 TABLET ORAL EVERY EVENING
Qty: 90 TABLET | Refills: 3 | Status: SHIPPED | OUTPATIENT
Start: 2025-02-28

## 2025-04-02 ENCOUNTER — OFFICE VISIT (OUTPATIENT)
Dept: FAMILY MEDICINE CLINIC | Facility: CLINIC | Age: 70
End: 2025-04-02
Payer: MEDICARE

## 2025-04-02 VITALS
BODY MASS INDEX: 20.79 KG/M2 | RESPIRATION RATE: 15 BRPM | OXYGEN SATURATION: 99 % | SYSTOLIC BLOOD PRESSURE: 122 MMHG | HEIGHT: 64 IN | DIASTOLIC BLOOD PRESSURE: 76 MMHG | WEIGHT: 121.8 LBS | TEMPERATURE: 97.8 F | HEART RATE: 88 BPM

## 2025-04-02 DIAGNOSIS — J06.9 ACUTE URI: Primary | ICD-10-CM

## 2025-04-02 PROCEDURE — 1160F RVW MEDS BY RX/DR IN RCRD: CPT | Performed by: INTERNAL MEDICINE

## 2025-04-02 PROCEDURE — 1159F MED LIST DOCD IN RCRD: CPT | Performed by: INTERNAL MEDICINE

## 2025-04-02 PROCEDURE — 1126F AMNT PAIN NOTED NONE PRSNT: CPT | Performed by: INTERNAL MEDICINE

## 2025-04-02 PROCEDURE — 1123F ACP DISCUSS/DSCN MKR DOCD: CPT | Performed by: INTERNAL MEDICINE

## 2025-04-02 PROCEDURE — 99213 OFFICE O/P EST LOW 20 MIN: CPT | Performed by: INTERNAL MEDICINE

## 2025-04-02 RX ORDER — BENZONATATE 200 MG/1
200 CAPSULE ORAL 3 TIMES DAILY PRN
Qty: 30 CAPSULE | Refills: 0 | Status: SHIPPED | OUTPATIENT
Start: 2025-04-02 | End: 2025-04-12

## 2025-04-02 RX ORDER — DOXYCYCLINE 100 MG/1
100 CAPSULE ORAL 2 TIMES DAILY
Qty: 20 CAPSULE | Refills: 0 | Status: SHIPPED | OUTPATIENT
Start: 2025-04-02 | End: 2025-04-12

## 2025-04-02 ASSESSMENT — PATIENT HEALTH QUESTIONNAIRE - PHQ9
2. FEELING DOWN, DEPRESSED OR HOPELESS: NOT AT ALL
SUM OF ALL RESPONSES TO PHQ QUESTIONS 1-9: 0
1. LITTLE INTEREST OR PLEASURE IN DOING THINGS: NOT AT ALL
SUM OF ALL RESPONSES TO PHQ QUESTIONS 1-9: 0

## 2025-04-02 ASSESSMENT — ENCOUNTER SYMPTOMS
SORE THROAT: 1
WHEEZING: 0
SHORTNESS OF BREATH: 0
SINUS PAIN: 1
SINUS PRESSURE: 1

## 2025-04-02 NOTE — PROGRESS NOTES
Kalyn Smith is a 69 y.o. female (: 1955) presenting to address:    Chief Complaint   Patient presents with    Cold Symptoms       Vitals:    25 1113   BP: 122/76   Pulse: 88   Resp: 15   Temp: 97.8 °F (36.6 °C)   SpO2: 99%       \"Have you been to the ER, urgent care clinic since your last visit?  Hospitalized since your last visit?\"    NO    “Have you seen or consulted any other health care providers outside of [facility] since your last visit?”    NO       Have you had a mammogram?”   NO    Date of last Mammogram: 2023

## 2025-04-02 NOTE — PROGRESS NOTES
HISTORY OF PRESENT ILLNESS  Kalyn Smith is a 69 y.o. female    HPI  Patient is in today complaining of fever/chills/facial pain/pressure/postnasal drip, greenish nasal discharge and dry cough, started 4 days ago, she had negative COVID and flu test yesterday, no wheezing or shortness of breath.    Review of Systems   HENT:  Positive for sinus pressure, sinus pain and sore throat. Negative for ear pain.    Respiratory:  Negative for shortness of breath and wheezing.          Physical Exam  Vitals reviewed.   HENT:      Right Ear: Tympanic membrane normal.      Left Ear: Tympanic membrane normal.      Nose: Congestion present.      Right Sinus: Maxillary sinus tenderness present.      Left Sinus: Maxillary sinus tenderness present.      Mouth/Throat:      Pharynx: Posterior oropharyngeal erythema present. No oropharyngeal exudate.   Cardiovascular:      Rate and Rhythm: Normal rate and regular rhythm.   Pulmonary:      Effort: Pulmonary effort is normal.      Breath sounds: Normal breath sounds.   Lymphadenopathy:      Cervical: No cervical adenopathy.          ASSESSMENT and PLAN    1. Acute URI, with acute sinusitis  -     doxycycline hyclate (VIBRAMYCIN) 100 MG capsule; Take 1 capsule by mouth 2 times daily for 10 days, Disp-20 capsule, R-0Normal  -     benzonatate (TESSALON) 200 MG capsule; Take 1 capsule by mouth 3 times daily as needed for Cough, Disp-30 capsule, R-0Normal         Return in about 4 months (around 8/2/2025), or if symptoms worsen or fail to improve, for Medicare Wellness next visit..

## 2025-04-04 ENCOUNTER — PATIENT MESSAGE (OUTPATIENT)
Dept: FAMILY MEDICINE CLINIC | Facility: CLINIC | Age: 70
End: 2025-04-04

## 2025-08-17 SDOH — HEALTH STABILITY: PHYSICAL HEALTH: ON AVERAGE, HOW MANY MINUTES DO YOU ENGAGE IN EXERCISE AT THIS LEVEL?: 60 MIN

## 2025-08-17 SDOH — HEALTH STABILITY: PHYSICAL HEALTH: ON AVERAGE, HOW MANY DAYS PER WEEK DO YOU ENGAGE IN MODERATE TO STRENUOUS EXERCISE (LIKE A BRISK WALK)?: 5 DAYS

## 2025-08-17 ASSESSMENT — PATIENT HEALTH QUESTIONNAIRE - PHQ9
SUM OF ALL RESPONSES TO PHQ QUESTIONS 1-9: 0
1. LITTLE INTEREST OR PLEASURE IN DOING THINGS: NOT AT ALL
2. FEELING DOWN, DEPRESSED OR HOPELESS: NOT AT ALL
SUM OF ALL RESPONSES TO PHQ QUESTIONS 1-9: 0

## 2025-08-17 ASSESSMENT — LIFESTYLE VARIABLES
HOW MANY STANDARD DRINKS CONTAINING ALCOHOL DO YOU HAVE ON A TYPICAL DAY: PATIENT DOES NOT DRINK
HOW MANY STANDARD DRINKS CONTAINING ALCOHOL DO YOU HAVE ON A TYPICAL DAY: 0
HOW OFTEN DO YOU HAVE SIX OR MORE DRINKS ON ONE OCCASION: 1
HOW OFTEN DO YOU HAVE A DRINK CONTAINING ALCOHOL: NEVER
HOW OFTEN DO YOU HAVE A DRINK CONTAINING ALCOHOL: 1

## 2025-08-20 ENCOUNTER — OFFICE VISIT (OUTPATIENT)
Dept: FAMILY MEDICINE CLINIC | Facility: CLINIC | Age: 70
End: 2025-08-20
Payer: MEDICARE

## 2025-08-20 VITALS
OXYGEN SATURATION: 100 % | BODY MASS INDEX: 20.42 KG/M2 | DIASTOLIC BLOOD PRESSURE: 81 MMHG | HEIGHT: 64 IN | TEMPERATURE: 98.1 F | SYSTOLIC BLOOD PRESSURE: 126 MMHG | HEART RATE: 60 BPM | WEIGHT: 119.6 LBS | RESPIRATION RATE: 15 BRPM

## 2025-08-20 DIAGNOSIS — R73.03 PREDIABETES: ICD-10-CM

## 2025-08-20 DIAGNOSIS — E78.2 MIXED HYPERLIPIDEMIA: ICD-10-CM

## 2025-08-20 DIAGNOSIS — Z00.00 MEDICARE ANNUAL WELLNESS VISIT, SUBSEQUENT: Primary | ICD-10-CM

## 2025-08-20 PROCEDURE — 99213 OFFICE O/P EST LOW 20 MIN: CPT | Performed by: INTERNAL MEDICINE

## 2025-08-20 PROCEDURE — 1123F ACP DISCUSS/DSCN MKR DOCD: CPT | Performed by: INTERNAL MEDICINE

## 2025-08-20 PROCEDURE — 1126F AMNT PAIN NOTED NONE PRSNT: CPT | Performed by: INTERNAL MEDICINE

## 2025-08-20 PROCEDURE — 1160F RVW MEDS BY RX/DR IN RCRD: CPT | Performed by: INTERNAL MEDICINE

## 2025-08-20 PROCEDURE — 1159F MED LIST DOCD IN RCRD: CPT | Performed by: INTERNAL MEDICINE

## 2025-08-20 PROCEDURE — G0439 PPPS, SUBSEQ VISIT: HCPCS | Performed by: INTERNAL MEDICINE

## 2025-08-20 ASSESSMENT — ENCOUNTER SYMPTOMS
COUGH: 0
SHORTNESS OF BREATH: 0

## 2025-08-21 ENCOUNTER — HOSPITAL ENCOUNTER (OUTPATIENT)
Facility: HOSPITAL | Age: 70
Setting detail: SPECIMEN
Discharge: HOME | End: 2025-08-24

## 2025-08-21 LAB
A/G RATIO: 2 RATIO (ref 1.1–2.6)
ALBUMIN: 4.7 G/DL (ref 3.5–5)
ALP BLD-CCNC: 51 U/L (ref 40–120)
ALT SERPL-CCNC: 19 U/L (ref 5–40)
ANION GAP SERPL CALCULATED.3IONS-SCNC: 12 MMOL/L (ref 3–15)
AST SERPL-CCNC: 25 U/L (ref 10–37)
BILIRUB SERPL-MCNC: 0.5 MG/DL (ref 0.2–1.2)
BUN BLDV-MCNC: 16 MG/DL (ref 6–22)
CALCIUM SERPL-MCNC: 9.8 MG/DL (ref 8.4–10.5)
CHLORIDE BLD-SCNC: 104 MMOL/L (ref 98–110)
CHOLESTEROL, TOTAL: 293 MG/DL (ref 110–200)
CHOLESTEROL/HDL RATIO: 3.3 (ref 0–5)
CO2: 27 MMOL/L (ref 20–32)
CREAT SERPL-MCNC: 0.7 MG/DL (ref 0.8–1.4)
ESTIMATED AVERAGE GLUCOSE: 116 MG/DL (ref 91–123)
GFR, ESTIMATED: >90 ML/MIN/1.73 SQ.M.
GLOBULIN: 2.4 G/DL (ref 2–4)
GLUCOSE: 85 MG/DL (ref 70–99)
HBA1C MFR BLD: 5.7 % (ref 4.8–5.6)
HDLC SERPL-MCNC: 89 MG/DL
LDL CHOLESTEROL: 192 MG/DL (ref 50–99)
LDL/HDL RATIO: 2.2
NON-HDL CHOLESTEROL: 204 MG/DL
POTASSIUM SERPL-SCNC: 4.8 MMOL/L (ref 3.5–5.5)
SENTARA SPECIMEN COLLECTION: NORMAL
SODIUM BLD-SCNC: 143 MMOL/L (ref 133–145)
TOTAL PROTEIN: 7.1 G/DL (ref 6.2–8.1)
TRIGL SERPL-MCNC: 56 MG/DL (ref 40–149)
VLDLC SERPL CALC-MCNC: 11 MG/DL (ref 8–30)

## 2025-08-21 PROCEDURE — 99001 SPECIMEN HANDLING PT-LAB: CPT

## 2025-08-24 ENCOUNTER — RESULTS FOLLOW-UP (OUTPATIENT)
Dept: FAMILY MEDICINE CLINIC | Facility: CLINIC | Age: 70
End: 2025-08-24

## 2025-08-25 RX ORDER — LOVASTATIN 10 MG/1
10 TABLET ORAL NIGHTLY
Qty: 90 TABLET | Refills: 0 | Status: SHIPPED | OUTPATIENT
Start: 2025-08-25